# Patient Record
Sex: FEMALE | Race: WHITE | NOT HISPANIC OR LATINO | Employment: FULL TIME | URBAN - METROPOLITAN AREA
[De-identification: names, ages, dates, MRNs, and addresses within clinical notes are randomized per-mention and may not be internally consistent; named-entity substitution may affect disease eponyms.]

---

## 2020-07-07 ENCOUNTER — VIRTUAL VISIT (OUTPATIENT)
Dept: FAMILY MEDICINE | Facility: OTHER | Age: 40
End: 2020-07-07

## 2020-07-07 DIAGNOSIS — Z20.822 SUSPECTED 2019 NOVEL CORONAVIRUS INFECTION: Primary | ICD-10-CM

## 2020-07-07 PROCEDURE — U0003 INFECTIOUS AGENT DETECTION BY NUCLEIC ACID (DNA OR RNA); SEVERE ACUTE RESPIRATORY SYNDROME CORONAVIRUS 2 (SARS-COV-2) (CORONAVIRUS DISEASE [COVID-19]), AMPLIFIED PROBE TECHNIQUE, MAKING USE OF HIGH THROUGHPUT TECHNOLOGIES AS DESCRIBED BY CMS-2020-01-R: HCPCS | Performed by: FAMILY MEDICINE

## 2020-07-07 PROCEDURE — 99207 ZZC NO BILLABLE SERVICE THIS VISIT: CPT

## 2020-07-07 NOTE — LETTER
July 11, 2020        Florinda Keller  [address] [facility] [address]    This letter provides a written record that you were tested for COVID-19 on 7/7/20.       Your result was negative. This means that we didn t find the virus that causes COVID-19 in your sample. A test may show negative when you do actually have the virus. This can happen when the virus is in the early stages of infection, before you feel illness symptoms.    If you have symptoms   Stay home and away from others (self-isolate) until you meet ALL of the guidelines below:    You ve had no fever--and no medicine that reduces fever--for 3 full days (72 hours). And      Your other symptoms have gotten better. For example, your cough or breathing has improved. And     At least 10 days have passed since your symptoms started.    During this time:    Stay home. Don t go to work, school or anywhere else.     Stay in your own room, including for meals. Use your own bathroom if you can.    Stay away from others in your home. No hugging, kissing or shaking hands. No visitors.    Clean  high touch  surfaces often (doorknobs, counters, handles, etc.). Use a household cleaning spray or wipes. You can find a full list on the EPA website at www.epa.gov/pesticide-registration/list-n-disinfectants-use-against-sars-cov-2.    Cover your mouth and nose with a mask, tissue or washcloth to avoid spreading germs.    Wash your hands and face often with soap and water.    Going back to work  Check with your employer for any guidelines to follow for going back to work.    Employers: This document serves as formal notice that your employee tested negative for COVID-19, as of the testing date shown above.

## 2020-07-08 LAB
SARS-COV-2 RNA SPEC QL NAA+PROBE: NOT DETECTED
SPECIMEN SOURCE: NORMAL

## 2020-07-09 NOTE — PROGRESS NOTES
"Date: 2020 08:43:38  Clinician: Dottie Coffman  Clinician NPI: [id number]  Patient: Florinda Keller  Patient : 1980  Patient Address: [address] [place] [address]  Patient Phone: [phone]  Visit Protocol: URI  Patient Summary:  Florinda is a 40 year old ( : 1980 ) female who initiated a Visit for COVID-19 (Coronavirus) evaluation and screening. When asked the question \"Please sign me up to receive news, health information and promotions. \", Florinda responded \"No\".    Florinda states her symptoms started 1-2 days ago.   Her symptoms consist of malaise, a headache, and chills. She is experiencing mild difficulty breathing with activities but can speak normally in full sentences. Florinda also feels feverish but was unable to measure her temperature.   Symptom details   Headache: She states the headache is mild (1-3 on a 10 point pain scale).    Florinda denies having wheezing, nausea, teeth pain, ageusia, diarrhea, vomiting, rhinitis, ear pain, sore throat, myalgias, anosmia, facial pain or pressure, cough, and nasal congestion. She also denies having recent facial or sinus surgery in the past 60 days, taking antibiotic medication in the past month, and having a sinus infection within the past year.   Precipitating events  She has not recently been exposed to someone with influenza. Florinda has not been in close contact with any high risk individuals.   Pertinent COVID-19 (Coronavirus) information  In the past 14 days, Florinda has not worked in a congregate living setting.   She does not work or volunteer as healthcare worker or a  and does not work or volunteer in a healthcare facility.   Florinda also has not lived in a congregate living setting in the past 14 days. She does not live with a healthcare worker.   Florinda has had a close contact with a laboratory-confirmed COVID-19 patient within 14 days of symptom onset. Additional information about contact with COVID-19 (Coronavirus) patient " as reported by the patient (free text): ,  and 7/3 exposed to boyfriend's Mom who was diagnosed with COVID, her    from COVID   Pertinent medical history  Florinda typically gets a yeast infection when she takes antibiotics. She is not sure if she has used fluconazole (Diflucan) to treat previous yeast infections.   Florinda does not need a return to work/school note.   Weight: 179 lbs   Florinda does not smoke or use smokeless tobacco.   She denies pregnancy and denies breastfeeding. She is currently menstruating.   Weight: 179 lbs    MEDICATIONS: levothyroxine oral, Gianvi (28) oral, ALLERGIES: Darvocet-N 100  Clinician Response:  Dear Florinda,   Your symptoms show that you may have coronavirus (COVID-19). This illness can cause fever, cough and trouble breathing. Many people get a mild case and get better on their own. Some people can get very sick.  What should I do?  We would like to test you for this virus.   1. Please call [phone] to schedule your visit. Explain that you were referred by [facility] to have a COVID-19 test. Be ready to share your [facility] visit ID number.  The following will serve as your written order for this COVID Test, ordered by me, for the indication of suspected COVID [Z20.828]: The test will be ordered in [employer], our electronic health record, after you are scheduled. It will show as ordered and authorized by Manuel Bourgeois MD.  Order: COVID-19 (Coronavirus) PCR for SYMPTOMATIC testing from [facility].      2. When it's time for your COVID test:  Stay at least 6 feet away from others. (If someone will drive you to your test, stay in the backseat, as far away from the  as you can.)   Cover your mouth and nose with a mask, tissue or washcloth.  Go straight to the testing site. Don't make any stops on the way there or back.      3.Starting now: Stay home and away from others (self-isolate) until:   You've had no fever---and no medicine that reduces fever---for 3 full days (17  "hours). And...   Your other symptoms have gotten better. For example, your cough or breathing has improved. And...   At least 10 days have passed since your symptoms started.       During this time, don't leave the house except for testing or medical care.   Stay in your own room, even for meals. Use your own bathroom if you can.   Stay away from others in your home. No hugging, kissing or shaking hands. No visitors.  Don't go to work, school or anywhere else.    Clean \"high touch\" surfaces often (doorknobs, counters, handles, etc.). Use a household cleaning spray or wipes. You'll find a full list of  on the EPA website: www.epa.gov/pesticide-registration/list-n-disinfectants-use-against-sars-cov-2.   Cover your mouth and nose with a mask, tissue or washcloth to avoid spreading germs.  Wash your hands and face often. Use soap and water.  Caregivers in these groups are at risk for severe illness due to COVID-19:  o People 65 years and older  o People who live in a nursing home or long-term care facility  o People with chronic disease (lung, heart, cancer, diabetes, kidney, liver, immunologic)  o People who have a weakened immune system, including those who:   Are in cancer treatment  Take medicine that weakens the immune system, such as corticosteroids  Had a bone marrow or organ transplant  Have an immune deficiency  Have poorly controlled HIV or AIDS  Are obese (body mass index of 40 or higher)  Smoke regularly   o Caregivers should wear gloves while washing dishes, handling laundry and cleaning bedrooms and bathrooms.  o Use caution when washing and drying laundry: Don't shake dirty laundry, and use the warmest water setting that you can.  o For more tips, go to www.cdc.gov/coronavirus/2019-ncov/downloads/10Things.pdf.    4.Sign up for [employer]. We know it's scary to hear that you might have COVID-19. We want to track your symptoms to make sure you're okay over the next 2 weeks. Please look for an " email from Javier Bravo---this is a free, online program that we'll use to keep in touch. To sign up, follow the link in the email. Learn more at [email or web address]  How can I take care of myself?   Get lots of rest. Drink extra fluids (unless a doctor has told you not to).   Take Tylenol (acetaminophen) for fever or pain. If you have liver or kidney problems, ask your family doctor if it's okay to take Tylenol.   Adults can take either:    650 mg (two 325 mg pills) every 4 to 6 hours, or...   1,000 mg (two 500 mg pills) every 8 hours as needed.    Note: Don't take more than 3,000 mg in one day. Acetaminophen is found in many medicines (both prescribed and over-the-counter medicines). Read all labels to be sure you don't take too much.   For children, check the Tylenol bottle for the right dose. The dose is based on the child's age or weight.    If you have other health problems (like cancer, heart failure, an organ transplant or severe kidney disease): Call your specialty clinic if you don't feel better in the next 2 days.       Know when to call 911. Emergency warning signs include:    Trouble breathing or shortness of breath Pain or pressure in the chest that doesn't go away Feeling confused like you haven't felt before, or not being able to wake up Bluish-colored lips or face.  Where can I get more information?   [facility] -- About COVID-19: [email or web address]   CDC -- What to Do If You're Sick: www.cdc.gov/coronavirus/2019-ncov/about/steps-when-sick.html   CDC -- Ending Home Isolation: www.cdc.gov/coronavirus/2019-ncov/hcp/disposition-in-home-patients.html   CDC -- Caring for Someone: www.cdc.gov/coronavirus/2019-ncov/if-you-are-sick/care-for-someone.html   [facility] -- Interim Guidance for Hospital Discharge to Home: www.health.[facility].[place]/diseases/coronavirus/hcp/hospdischarge.pdf   [facility] clinical trials (COVID-19 research studies):  clinicalaffairs.[facility]-clinical-trials    Below are the COVID-19 hotlines at the [place] Department of Health ([facility]). Interpreters are available.    For health questions: Call 848-865-3321 or 1-725.750.2011 (7 a.m. to 7 p.m.) For questions about schools and childcare: Call 750-532-0818 or 1-958.151.7976 (7 a.m. to 7 p.m.)    Diagnosis: Acute upper respiratory infection, unspecified  Diagnosis ICD: J06.9

## 2021-05-15 ENCOUNTER — HEALTH MAINTENANCE LETTER (OUTPATIENT)
Age: 41
End: 2021-05-15

## 2021-07-19 ENCOUNTER — TRANSFERRED RECORDS (OUTPATIENT)
Dept: HEALTH INFORMATION MANAGEMENT | Facility: CLINIC | Age: 41
End: 2021-07-19

## 2021-07-20 LAB
ABO (EXTERNAL): NORMAL
HEPATITIS B SURFACE ANTIGEN (EXTERNAL): NEGATIVE
HIV1+2 AB SERPL QL IA: NEGATIVE
RH (EXTERNAL): POSITIVE
RUBELLA ANTIBODY IGG (EXTERNAL): NORMAL
TREPONEMA PALLIDUM ANTIBODY (EXTERNAL): NEGATIVE

## 2021-08-17 ENCOUNTER — MEDICAL CORRESPONDENCE (OUTPATIENT)
Dept: HEALTH INFORMATION MANAGEMENT | Facility: CLINIC | Age: 41
End: 2021-08-17

## 2021-08-18 ENCOUNTER — TRANSCRIBE ORDERS (OUTPATIENT)
Dept: MATERNAL FETAL MEDICINE | Facility: CLINIC | Age: 41
End: 2021-08-18

## 2021-08-18 DIAGNOSIS — O26.90 PREGNANCY RELATED CONDITION, ANTEPARTUM: Primary | ICD-10-CM

## 2021-08-23 ENCOUNTER — PRE VISIT (OUTPATIENT)
Dept: MATERNAL FETAL MEDICINE | Facility: CLINIC | Age: 41
End: 2021-08-23

## 2021-08-30 ENCOUNTER — OFFICE VISIT (OUTPATIENT)
Dept: MATERNAL FETAL MEDICINE | Facility: CLINIC | Age: 41
End: 2021-08-30
Attending: SPECIALIST
Payer: COMMERCIAL

## 2021-08-30 ENCOUNTER — HOSPITAL ENCOUNTER (OUTPATIENT)
Dept: ULTRASOUND IMAGING | Facility: CLINIC | Age: 41
End: 2021-08-30
Attending: SPECIALIST
Payer: COMMERCIAL

## 2021-08-30 DIAGNOSIS — O09.522 MULTIGRAVIDA OF ADVANCED MATERNAL AGE IN SECOND TRIMESTER: Primary | ICD-10-CM

## 2021-08-30 DIAGNOSIS — O26.90 PREGNANCY RELATED CONDITION, ANTEPARTUM: ICD-10-CM

## 2021-08-30 DIAGNOSIS — O09.512 AMA (ADVANCED MATERNAL AGE) PRIMIGRAVIDA 35+, SECOND TRIMESTER: Primary | ICD-10-CM

## 2021-08-30 PROCEDURE — 96040 HC GENETIC COUNSELING, EACH 30 MINUTES: CPT | Performed by: GENETIC COUNSELOR, MS

## 2021-08-30 PROCEDURE — 76811 OB US DETAILED SNGL FETUS: CPT

## 2021-08-30 PROCEDURE — 76811 OB US DETAILED SNGL FETUS: CPT | Mod: 26 | Performed by: OBSTETRICS & GYNECOLOGY

## 2021-08-30 NOTE — PROGRESS NOTES
[facility]  Genetic Counseling Consult    Patient:  Florinda Keller YOB: 1980   Date of Service:  21      Florinda Keller was seen at the [facility] for genetic consultation as part of her appointment for comprehensive ultrasound.  The indication for genetic counseling is advanced maternal age. The patient was unaccompanied to today's visit.        Impression/Plan:   1. Florinda had a comprehensive (level II) ultrasound today.  Please see the ultrasound report for further details.    2. Florinda underwent [id number] NIPT earlier in this pregnancy, which was low risk. She is aware genetic amniocentesis will remain available to her.     Pregnancy History:   /Parity:    Age at Delivery: 41 year old  RAI: 2022, by Last Menstrual Period  Gestational Age: 18w2d    No significant complications or exposures were reported in the current pregnancy.    Medical History:   Florinda has a history of a thyroid growth requiring removal and is now managed with levothyroxine.        Family History:   A three-generation pedigree was obtained, and is scanned under the  Media  tab.   The following significant findings were reported by Florinda:    In addition to Florinda, her maternal grandmother and her partner, Mack's two siblings were reported to have a history of a thyroid growth requiring removal. They were encouraged to share this family history information with their pediatrician.     Florinda's paternal first cousin was reported to have developmental delay, hearing loss, congential heart disease, and a feeding tube. Florinda shared that there was concern for maternal drug use during pregnancy as a possible etiology for these health concerns. This cousin has other healthy siblings.     Florinda's paternal first cousin once removed was reported to have delays and other associated health concerns. Florinda shared that this relative was  identified to have health problems prenatally, however further details are not known. If more information is gathered regarding this individual it would be reasonable to revisit this family history for a more accurate risk assessment.     Otherwise, the reported family history is negative for multiple miscarriages, stillbirths, birth defects, intellectual disability, known genetic conditions, and consanguinity.       Carrier Screening:   The patient reports that she is of  ancestry:     Cystic fibrosis is an autosomal recessive genetic condition that occurs with increased frequency in individuals of  ancestry and carrier screening for this condition is available.  In addition,  screening in the [facility] includes cystic fibrosis.    The patient reports that the father of the pregnancy has [place] and [place] ancestry:     The hemoglobinopathies are a group of genetic blood diseases that occur with increased frequency in individuals of this ancestry and carrier screening for these conditions is available.  Carrier screening for the hemoglobinopathies includes a CBC with red blood cell indices, a ferritin level, and a quantitative hemoglobin electrophoresis or HPLC.  In addition,  screening in the [facility] includes many of the hemoglobinopathies.      Expanded carrier screening for mutations in a large panel of genes associated with autosomal recessive conditions including cystic fibrosis, spinal muscular atrophy, and others, is now available.      Florinda underwent [employer] direct to consumer testing. 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 Her partner has not undergone carrier screening for this condition. Reviewed limitations of [employer] carrier screening and discussed option of comprehensive  clinical expanded carrier screening for the couple. Florinda is aware that this will remain available to her and Mack.        Risk Assessment for Chromosome Conditions:   We explained that the risk for fetal chromosome abnormalities increases with maternal age. We discussed specific features of common chromosome abnormalities, including Down syndrome, trisomy 13, trisomy 18, and sex chromosome trisomies.      - At age 41 at midtrimester, the risk to have a baby with Down syndrome is 1 in 98.     - At age 41 at midtrimester, the risk to have a baby with any chromosome abnormality is 1 in 51.       Florinda had maternal serum screening earlier in pregnancy.     Non-invasive Prenatal Testing (NIPT)    Maternal plasma cell-free DNA testing    Screens for fetal trisomy 21, trisomy 13, trisomy 18, and sex chromosome aneuploidy    Florinda had a [id number] NIPT earlier in pregnancy; we reviewed the results today, which are normal for chromosome 13, chromosome 18 and chromosome 21 (no aneuploidy detected). SCA was not performed.     Given the accuracy of this test, these results greatly decrease the chance for certain fetal chromosome abnormalities    We discussed the limitations of normal NIPT results    MSAFP (after 15 weeks for open neural tube defect screening) results were not available for our review today.       Testing Options:   We discussed the following options:   Genetic Amniocentesis    Invasive procedure typically performed in the second trimester by which amniotic fluid is obtained for the purpose of chromosome analysis and/or other prenatal genetic analysis    Diagnostic results; >99% sensitivity for fetal chromosome abnormalities    AFAFP measurement tests for open neural tube defects     Comprehensive (Level II) ultrasound: Detailed ultrasound performed between 18-22 weeks gestation to screen for major birth defects and markers for aneuploidy.      We reviewed the benefits and limitations of this testing.  Screening  tests provide a risk assessment specific to the pregnancy for certain fetal chromosome abnormalities, but cannot definitively diagnose or exclude a fetal chromosome abnormality.  Follow-up genetic counseling and consideration of diagnostic testing is recommended with any abnormal screening result.     Diagnostic tests carry inherent risks- including risk of miscarriage- that require careful consideration.  These tests can detect fetal chromosome abnormalities with greater than 99% certainty.  Results can be compromised by maternal cell contamination or mosaicism, and are limited by the resolution of cytogenetic G-banding technology.  There is no screening nor diagnostic test that can detect all forms of birth defects or mental disability.    It was a pleasure to be involved with Florinda s care. Face-to-face time of the meeting was 25 minutes.      Yazmin Garcia MS, [facility]  Licensed Genetic Counselor  [facility]  Maternal Fetal Medicine  Ph: [phone]  [email or web address]

## 2021-08-30 NOTE — PROGRESS NOTES
Please see full imaging report from View[employer] program under imaging tab.      Ivory Montalvo MD  Maternal Fetal Medicine

## 2021-09-04 ENCOUNTER — HEALTH MAINTENANCE LETTER (OUTPATIENT)
Age: 41
End: 2021-09-04

## 2021-11-08 ENCOUNTER — TRANSFERRED RECORDS (OUTPATIENT)
Dept: HEALTH INFORMATION MANAGEMENT | Facility: CLINIC | Age: 41
End: 2021-11-08
Payer: COMMERCIAL

## 2022-01-05 LAB — GROUP B STREPTOCOCCUS (EXTERNAL): NEGATIVE

## 2022-01-31 DIAGNOSIS — Z11.52 ENCOUNTER FOR PREOPERATIVE SCREENING LABORATORY TESTING FOR COVID-19 VIRUS: Primary | ICD-10-CM

## 2022-01-31 DIAGNOSIS — Z01.812 ENCOUNTER FOR PREOPERATIVE SCREENING LABORATORY TESTING FOR COVID-19 VIRUS: Primary | ICD-10-CM

## 2022-02-02 ENCOUNTER — LAB (OUTPATIENT)
Dept: LAB | Facility: CLINIC | Age: 42
End: 2022-02-02
Attending: SPECIALIST
Payer: COMMERCIAL

## 2022-02-02 DIAGNOSIS — Z11.52 ENCOUNTER FOR PREOPERATIVE SCREENING LABORATORY TESTING FOR COVID-19 VIRUS: ICD-10-CM

## 2022-02-02 DIAGNOSIS — Z01.812 ENCOUNTER FOR PREOPERATIVE SCREENING LABORATORY TESTING FOR COVID-19 VIRUS: ICD-10-CM

## 2022-02-02 PROCEDURE — U0003 INFECTIOUS AGENT DETECTION BY NUCLEIC ACID (DNA OR RNA); SEVERE ACUTE RESPIRATORY SYNDROME CORONAVIRUS 2 (SARS-COV-2) (CORONAVIRUS DISEASE [COVID-19]), AMPLIFIED PROBE TECHNIQUE, MAKING USE OF HIGH THROUGHPUT TECHNOLOGIES AS DESCRIBED BY CMS-2020-01-R: HCPCS

## 2022-02-02 PROCEDURE — U0005 INFEC AGEN DETEC AMPLI PROBE: HCPCS

## 2022-02-03 LAB — SARS-COV-2 RNA RESP QL NAA+PROBE: NEGATIVE

## 2022-02-04 ENCOUNTER — HOSPITAL ENCOUNTER (INPATIENT)
Facility: CLINIC | Age: 42
LOS: 3 days | Discharge: HOME | End: 2022-02-07
Attending: SPECIALIST | Admitting: SPECIALIST
Payer: COMMERCIAL

## 2022-02-04 ENCOUNTER — ANESTHESIA EVENT (OUTPATIENT)
Dept: OBGYN | Facility: CLINIC | Age: 42
End: 2022-02-04
Payer: COMMERCIAL

## 2022-02-04 ENCOUNTER — ANESTHESIA (OUTPATIENT)
Dept: OBGYN | Facility: CLINIC | Age: 42
End: 2022-02-04
Payer: COMMERCIAL

## 2022-02-04 LAB
ABO/RH(D): NORMAL
ANTIBODY SCREEN: NEGATIVE
BASOPHILS # BLD AUTO: 0 10E3/UL (ref 0–0.2)
BASOPHILS NFR BLD AUTO: 0 %
EOSINOPHIL # BLD AUTO: 0.1 10E3/UL (ref 0–0.7)
EOSINOPHIL NFR BLD AUTO: 1 %
ERYTHROCYTE [DISTWIDTH] IN BLOOD BY AUTOMATED COUNT: 13.5 % (ref 10–15)
HCT VFR BLD AUTO: 35.4 % (ref 35–47)
HGB BLD-MCNC: 11.4 G/DL (ref 11.7–15.7)
IMM GRANULOCYTES # BLD: 0.1 10E3/UL
IMM GRANULOCYTES NFR BLD: 1 %
LYMPHOCYTES # BLD AUTO: 2 10E3/UL (ref 0.8–5.3)
LYMPHOCYTES NFR BLD AUTO: 18 %
MCH RBC QN AUTO: 28.3 PG (ref 26.5–33)
MCHC RBC AUTO-ENTMCNC: 32.2 G/DL (ref 31.5–36.5)
MCV RBC AUTO: 88 FL (ref 78–100)
MONOCYTES # BLD AUTO: 0.6 10E3/UL (ref 0–1.3)
MONOCYTES NFR BLD AUTO: 5 %
NEUTROPHILS # BLD AUTO: 8.1 10E3/UL (ref 1.6–8.3)
NEUTROPHILS NFR BLD AUTO: 75 %
NRBC # BLD AUTO: 0 10E3/UL
NRBC BLD AUTO-RTO: 0 /100
PLATELET # BLD AUTO: 241 10E3/UL (ref 150–450)
RBC # BLD AUTO: 4.03 10E6/UL (ref 3.8–5.2)
SPECIMEN EXPIRATION DATE: NORMAL
WBC # BLD AUTO: 10.8 10E3/UL (ref 4–11)

## 2022-02-04 PROCEDURE — 86780 TREPONEMA PALLIDUM: CPT | Performed by: SPECIALIST

## 2022-02-04 PROCEDURE — 120N000001 HC R&B MED SURG/OB

## 2022-02-04 PROCEDURE — G0463 HOSPITAL OUTPT CLINIC VISIT: HCPCS | Mod: 25

## 2022-02-04 PROCEDURE — 85014 HEMATOCRIT: CPT | Performed by: SPECIALIST

## 2022-02-04 PROCEDURE — 370N000003 HC ANESTHESIA WARD SERVICE

## 2022-02-04 PROCEDURE — 86901 BLOOD TYPING SEROLOGIC RH(D): CPT | Performed by: SPECIALIST

## 2022-02-04 PROCEDURE — 258N000003 HC RX IP 258 OP 636: Performed by: SPECIALIST

## 2022-02-04 PROCEDURE — 250N000011 HC RX IP 250 OP 636: Performed by: ANESTHESIOLOGY

## 2022-02-04 PROCEDURE — 59025 FETAL NON-STRESS TEST: CPT

## 2022-02-04 PROCEDURE — 36415 COLL VENOUS BLD VENIPUNCTURE: CPT | Performed by: SPECIALIST

## 2022-02-04 RX ORDER — ONDANSETRON 4 MG/1
4 TABLET, ORALLY DISINTEGRATING ORAL EVERY 6 HOURS PRN
Status: DISCONTINUED | OUTPATIENT
Start: 2022-02-04 | End: 2022-02-04 | Stop reason: HOSPADM

## 2022-02-04 RX ORDER — PROCHLORPERAZINE MALEATE 5 MG
10 TABLET ORAL EVERY 6 HOURS PRN
Status: DISCONTINUED | OUTPATIENT
Start: 2022-02-04 | End: 2022-02-04 | Stop reason: HOSPADM

## 2022-02-04 RX ORDER — NALOXONE HYDROCHLORIDE 0.4 MG/ML
0.2 INJECTION, SOLUTION INTRAMUSCULAR; INTRAVENOUS; SUBCUTANEOUS
Status: DISCONTINUED | OUTPATIENT
Start: 2022-02-04 | End: 2022-02-05 | Stop reason: HOSPADM

## 2022-02-04 RX ORDER — CARBOPROST TROMETHAMINE 250 UG/ML
250 INJECTION, SOLUTION INTRAMUSCULAR
Status: DISCONTINUED | OUTPATIENT
Start: 2022-02-04 | End: 2022-02-05 | Stop reason: HOSPADM

## 2022-02-04 RX ORDER — METOCLOPRAMIDE 10 MG/1
10 TABLET ORAL EVERY 6 HOURS PRN
Status: DISCONTINUED | OUTPATIENT
Start: 2022-02-04 | End: 2022-02-05 | Stop reason: HOSPADM

## 2022-02-04 RX ORDER — NALOXONE HYDROCHLORIDE 0.4 MG/ML
0.4 INJECTION, SOLUTION INTRAMUSCULAR; INTRAVENOUS; SUBCUTANEOUS
Status: DISCONTINUED | OUTPATIENT
Start: 2022-02-04 | End: 2022-02-05 | Stop reason: HOSPADM

## 2022-02-04 RX ORDER — EPHEDRINE SULFATE 50 MG/ML
5 INJECTION, SOLUTION INTRAMUSCULAR; INTRAVENOUS; SUBCUTANEOUS
Status: DISCONTINUED | OUTPATIENT
Start: 2022-02-04 | End: 2022-02-05 | Stop reason: HOSPADM

## 2022-02-04 RX ORDER — ONDANSETRON 2 MG/ML
4 INJECTION INTRAMUSCULAR; INTRAVENOUS EVERY 6 HOURS PRN
Status: DISCONTINUED | OUTPATIENT
Start: 2022-02-04 | End: 2022-02-05 | Stop reason: HOSPADM

## 2022-02-04 RX ORDER — ONDANSETRON 4 MG/1
4 TABLET, ORALLY DISINTEGRATING ORAL EVERY 6 HOURS PRN
Status: DISCONTINUED | OUTPATIENT
Start: 2022-02-04 | End: 2022-02-05 | Stop reason: HOSPADM

## 2022-02-04 RX ORDER — ACETAMINOPHEN 325 MG/1
650 TABLET ORAL EVERY 4 HOURS PRN
Status: DISCONTINUED | OUTPATIENT
Start: 2022-02-04 | End: 2022-02-05 | Stop reason: HOSPADM

## 2022-02-04 RX ORDER — IBUPROFEN 600 MG/1
600 TABLET, FILM COATED ORAL
Status: DISCONTINUED | OUTPATIENT
Start: 2022-02-04 | End: 2022-02-07 | Stop reason: HOSPADM

## 2022-02-04 RX ORDER — OXYTOCIN/0.9 % SODIUM CHLORIDE 30/500 ML
340 PLASTIC BAG, INJECTION (ML) INTRAVENOUS CONTINUOUS PRN
Status: DISCONTINUED | OUTPATIENT
Start: 2022-02-04 | End: 2022-02-05 | Stop reason: HOSPADM

## 2022-02-04 RX ORDER — PROCHLORPERAZINE 25 MG
25 SUPPOSITORY, RECTAL RECTAL EVERY 12 HOURS PRN
Status: DISCONTINUED | OUTPATIENT
Start: 2022-02-04 | End: 2022-02-05 | Stop reason: HOSPADM

## 2022-02-04 RX ORDER — METOCLOPRAMIDE 10 MG/1
10 TABLET ORAL EVERY 6 HOURS PRN
Status: DISCONTINUED | OUTPATIENT
Start: 2022-02-04 | End: 2022-02-04 | Stop reason: HOSPADM

## 2022-02-04 RX ORDER — METHYLERGONOVINE MALEATE 0.2 MG/ML
200 INJECTION INTRAVENOUS
Status: DISCONTINUED | OUTPATIENT
Start: 2022-02-04 | End: 2022-02-05 | Stop reason: HOSPADM

## 2022-02-04 RX ORDER — OXYTOCIN/0.9 % SODIUM CHLORIDE 30/500 ML
100-340 PLASTIC BAG, INJECTION (ML) INTRAVENOUS CONTINUOUS PRN
Status: DISCONTINUED | OUTPATIENT
Start: 2022-02-04 | End: 2022-02-07 | Stop reason: HOSPADM

## 2022-02-04 RX ORDER — OXYTOCIN 10 [USP'U]/ML
10 INJECTION, SOLUTION INTRAMUSCULAR; INTRAVENOUS
Status: DISCONTINUED | OUTPATIENT
Start: 2022-02-04 | End: 2022-02-05 | Stop reason: HOSPADM

## 2022-02-04 RX ORDER — KETOROLAC TROMETHAMINE 30 MG/ML
30 INJECTION, SOLUTION INTRAMUSCULAR; INTRAVENOUS
Status: DISCONTINUED | OUTPATIENT
Start: 2022-02-04 | End: 2022-02-07 | Stop reason: HOSPADM

## 2022-02-04 RX ORDER — VITAMIN A ACETATE, .BETA.-CAROTENE, ASCORBIC ACID, CHOLECALCIFEROL, .ALPHA.-TOCOPHEROL ACETATE, DL-, THIAMINE MONONITRATE, RIBOFLAVIN, NIACINAMIDE, PYRIDOXINE HYDROCHLORIDE, FOLIC ACID, CYANOCOBALAMIN, CALCIUM CARBONATE, FERROUS FUMARATE, ZINC OXIDE, AND CUPRIC OXIDE 2000; 2000; 120; 400; 22; 1.84; 3; 20; 10; 1; 12; 200; 27; 25; 2 [IU]/1; [IU]/1; MG/1; [IU]/1; MG/1; MG/1; MG/1; MG/1; MG/1; MG/1; UG/1; MG/1; MG/1; MG/1; MG/1
1 TABLET ORAL DAILY
COMMUNITY

## 2022-02-04 RX ORDER — TRANEXAMIC ACID 10 MG/ML
1 INJECTION, SOLUTION INTRAVENOUS EVERY 30 MIN PRN
Status: DISCONTINUED | OUTPATIENT
Start: 2022-02-04 | End: 2022-02-05 | Stop reason: HOSPADM

## 2022-02-04 RX ORDER — METOCLOPRAMIDE HYDROCHLORIDE 5 MG/ML
10 INJECTION INTRAMUSCULAR; INTRAVENOUS EVERY 6 HOURS PRN
Status: DISCONTINUED | OUTPATIENT
Start: 2022-02-04 | End: 2022-02-05 | Stop reason: HOSPADM

## 2022-02-04 RX ORDER — ONDANSETRON 2 MG/ML
4 INJECTION INTRAMUSCULAR; INTRAVENOUS EVERY 6 HOURS PRN
Status: DISCONTINUED | OUTPATIENT
Start: 2022-02-04 | End: 2022-02-04 | Stop reason: HOSPADM

## 2022-02-04 RX ORDER — MISOPROSTOL 200 UG/1
800 TABLET ORAL
Status: DISCONTINUED | OUTPATIENT
Start: 2022-02-04 | End: 2022-02-05 | Stop reason: HOSPADM

## 2022-02-04 RX ORDER — FENTANYL CITRATE 50 UG/ML
50-100 INJECTION, SOLUTION INTRAMUSCULAR; INTRAVENOUS
Status: DISCONTINUED | OUTPATIENT
Start: 2022-02-04 | End: 2022-02-05 | Stop reason: HOSPADM

## 2022-02-04 RX ORDER — OXYTOCIN 10 [USP'U]/ML
10 INJECTION, SOLUTION INTRAMUSCULAR; INTRAVENOUS
Status: DISCONTINUED | OUTPATIENT
Start: 2022-02-04 | End: 2022-02-07 | Stop reason: HOSPADM

## 2022-02-04 RX ORDER — PROCHLORPERAZINE MALEATE 5 MG
10 TABLET ORAL EVERY 6 HOURS PRN
Status: DISCONTINUED | OUTPATIENT
Start: 2022-02-04 | End: 2022-02-05 | Stop reason: HOSPADM

## 2022-02-04 RX ORDER — SODIUM CHLORIDE, SODIUM LACTATE, POTASSIUM CHLORIDE, CALCIUM CHLORIDE 600; 310; 30; 20 MG/100ML; MG/100ML; MG/100ML; MG/100ML
INJECTION, SOLUTION INTRAVENOUS CONTINUOUS
Status: DISCONTINUED | OUTPATIENT
Start: 2022-02-04 | End: 2022-02-05 | Stop reason: HOSPADM

## 2022-02-04 RX ORDER — ROPIVACAINE HYDROCHLORIDE 2 MG/ML
10 INJECTION, SOLUTION EPIDURAL; INFILTRATION; PERINEURAL ONCE
Status: DISCONTINUED | OUTPATIENT
Start: 2022-02-04 | End: 2022-02-05 | Stop reason: HOSPADM

## 2022-02-04 RX ORDER — PROCHLORPERAZINE 25 MG
25 SUPPOSITORY, RECTAL RECTAL EVERY 12 HOURS PRN
Status: DISCONTINUED | OUTPATIENT
Start: 2022-02-04 | End: 2022-02-04 | Stop reason: HOSPADM

## 2022-02-04 RX ORDER — NALBUPHINE HYDROCHLORIDE 10 MG/ML
2.5-5 INJECTION, SOLUTION INTRAMUSCULAR; INTRAVENOUS; SUBCUTANEOUS EVERY 6 HOURS PRN
Status: DISCONTINUED | OUTPATIENT
Start: 2022-02-04 | End: 2022-02-07 | Stop reason: HOSPADM

## 2022-02-04 RX ORDER — MISOPROSTOL 200 UG/1
400 TABLET ORAL
Status: DISCONTINUED | OUTPATIENT
Start: 2022-02-04 | End: 2022-02-05 | Stop reason: HOSPADM

## 2022-02-04 RX ORDER — METOCLOPRAMIDE HYDROCHLORIDE 5 MG/ML
10 INJECTION INTRAMUSCULAR; INTRAVENOUS EVERY 6 HOURS PRN
Status: DISCONTINUED | OUTPATIENT
Start: 2022-02-04 | End: 2022-02-04 | Stop reason: HOSPADM

## 2022-02-04 RX ORDER — LEVOTHYROXINE SODIUM 88 UG/1
88 TABLET ORAL DAILY
COMMUNITY

## 2022-02-04 RX ADMIN — SODIUM CHLORIDE, POTASSIUM CHLORIDE, SODIUM LACTATE AND CALCIUM CHLORIDE 1000 ML: 600; 310; 30; 20 INJECTION, SOLUTION INTRAVENOUS at 21:18

## 2022-02-04 RX ADMIN — Medication: at 22:05

## 2022-02-04 RX ADMIN — SODIUM CHLORIDE, POTASSIUM CHLORIDE, SODIUM LACTATE AND CALCIUM CHLORIDE: 600; 310; 30; 20 INJECTION, SOLUTION INTRAVENOUS at 21:56

## 2022-02-04 ASSESSMENT — ACTIVITIES OF DAILY LIVING (ADL): ADLS_ACUITY_SCORE: 4

## 2022-02-04 ASSESSMENT — MIFFLIN-ST. JEOR: SCORE: 1707.81

## 2022-02-05 LAB — T PALLIDUM AB SER QL: NONREACTIVE

## 2022-02-05 PROCEDURE — 250N000013 HC RX MED GY IP 250 OP 250 PS 637: Performed by: SPECIALIST

## 2022-02-05 PROCEDURE — 722N000001 HC LABOR CARE VAGINAL DELIVERY SINGLE

## 2022-02-05 PROCEDURE — 10907ZC DRAINAGE OF AMNIOTIC FLUID, THERAPEUTIC FROM PRODUCTS OF CONCEPTION, VIA NATURAL OR ARTIFICIAL OPENING: ICD-10-PCS | Performed by: SPECIALIST

## 2022-02-05 PROCEDURE — 250N000011 HC RX IP 250 OP 636: Performed by: ANESTHESIOLOGY

## 2022-02-05 PROCEDURE — 3E0R3BZ INTRODUCTION OF ANESTHETIC AGENT INTO SPINAL CANAL, PERCUTANEOUS APPROACH: ICD-10-PCS | Performed by: ANESTHESIOLOGY

## 2022-02-05 PROCEDURE — 120N000012 HC R&B POSTPARTUM

## 2022-02-05 PROCEDURE — 00HU33Z INSERTION OF INFUSION DEVICE INTO SPINAL CANAL, PERCUTANEOUS APPROACH: ICD-10-PCS | Performed by: ANESTHESIOLOGY

## 2022-02-05 PROCEDURE — 0KQM0ZZ REPAIR PERINEUM MUSCLE, OPEN APPROACH: ICD-10-PCS | Performed by: SPECIALIST

## 2022-02-05 PROCEDURE — 250N000009 HC RX 250: Performed by: SPECIALIST

## 2022-02-05 RX ORDER — CARBOPROST TROMETHAMINE 250 UG/ML
250 INJECTION, SOLUTION INTRAMUSCULAR
Status: DISCONTINUED | OUTPATIENT
Start: 2022-02-05 | End: 2022-02-07 | Stop reason: HOSPADM

## 2022-02-05 RX ORDER — IBUPROFEN 400 MG/1
800 TABLET, FILM COATED ORAL EVERY 6 HOURS PRN
Status: DISCONTINUED | OUTPATIENT
Start: 2022-02-05 | End: 2022-02-07 | Stop reason: HOSPADM

## 2022-02-05 RX ORDER — LEVOTHYROXINE SODIUM 88 UG/1
88 TABLET ORAL
Status: DISCONTINUED | OUTPATIENT
Start: 2022-02-05 | End: 2022-02-07 | Stop reason: HOSPADM

## 2022-02-05 RX ORDER — DOCUSATE SODIUM 100 MG/1
100 CAPSULE, LIQUID FILLED ORAL DAILY
Status: DISCONTINUED | OUTPATIENT
Start: 2022-02-05 | End: 2022-02-07 | Stop reason: HOSPADM

## 2022-02-05 RX ORDER — BISACODYL 10 MG
10 SUPPOSITORY, RECTAL RECTAL DAILY PRN
Status: DISCONTINUED | OUTPATIENT
Start: 2022-02-05 | End: 2022-02-07 | Stop reason: HOSPADM

## 2022-02-05 RX ORDER — METHYLERGONOVINE MALEATE 0.2 MG/ML
200 INJECTION INTRAVENOUS
Status: DISCONTINUED | OUTPATIENT
Start: 2022-02-05 | End: 2022-02-07 | Stop reason: HOSPADM

## 2022-02-05 RX ORDER — OXYCODONE HYDROCHLORIDE 5 MG/1
5 TABLET ORAL EVERY 4 HOURS PRN
Status: DISCONTINUED | OUTPATIENT
Start: 2022-02-05 | End: 2022-02-07 | Stop reason: HOSPADM

## 2022-02-05 RX ORDER — MISOPROSTOL 200 UG/1
400 TABLET ORAL
Status: DISCONTINUED | OUTPATIENT
Start: 2022-02-05 | End: 2022-02-07 | Stop reason: HOSPADM

## 2022-02-05 RX ORDER — OXYTOCIN 10 [USP'U]/ML
10 INJECTION, SOLUTION INTRAMUSCULAR; INTRAVENOUS
Status: DISCONTINUED | OUTPATIENT
Start: 2022-02-05 | End: 2022-02-07 | Stop reason: HOSPADM

## 2022-02-05 RX ORDER — MODIFIED LANOLIN
OINTMENT (GRAM) TOPICAL
Status: DISCONTINUED | OUTPATIENT
Start: 2022-02-05 | End: 2022-02-07 | Stop reason: HOSPADM

## 2022-02-05 RX ORDER — OXYTOCIN/0.9 % SODIUM CHLORIDE 30/500 ML
1-24 PLASTIC BAG, INJECTION (ML) INTRAVENOUS CONTINUOUS
Status: DISCONTINUED | OUTPATIENT
Start: 2022-02-05 | End: 2022-02-05 | Stop reason: HOSPADM

## 2022-02-05 RX ORDER — SODIUM CHLORIDE, SODIUM LACTATE, POTASSIUM CHLORIDE, CALCIUM CHLORIDE 600; 310; 30; 20 MG/100ML; MG/100ML; MG/100ML; MG/100ML
INJECTION, SOLUTION INTRAVENOUS CONTINUOUS PRN
Status: DISCONTINUED | OUTPATIENT
Start: 2022-02-05 | End: 2022-02-05 | Stop reason: HOSPADM

## 2022-02-05 RX ORDER — HYDROCORTISONE 2.5 %
CREAM (GRAM) TOPICAL 3 TIMES DAILY PRN
Status: DISCONTINUED | OUTPATIENT
Start: 2022-02-05 | End: 2022-02-07 | Stop reason: HOSPADM

## 2022-02-05 RX ORDER — TRANEXAMIC ACID 10 MG/ML
1 INJECTION, SOLUTION INTRAVENOUS EVERY 30 MIN PRN
Status: DISCONTINUED | OUTPATIENT
Start: 2022-02-05 | End: 2022-02-07 | Stop reason: HOSPADM

## 2022-02-05 RX ORDER — OXYTOCIN/0.9 % SODIUM CHLORIDE 30/500 ML
340 PLASTIC BAG, INJECTION (ML) INTRAVENOUS CONTINUOUS PRN
Status: DISCONTINUED | OUTPATIENT
Start: 2022-02-05 | End: 2022-02-07 | Stop reason: HOSPADM

## 2022-02-05 RX ORDER — ACETAMINOPHEN 325 MG/1
650 TABLET ORAL EVERY 4 HOURS PRN
Status: DISCONTINUED | OUTPATIENT
Start: 2022-02-05 | End: 2022-02-07 | Stop reason: HOSPADM

## 2022-02-05 RX ORDER — MISOPROSTOL 200 UG/1
800 TABLET ORAL
Status: DISCONTINUED | OUTPATIENT
Start: 2022-02-05 | End: 2022-02-07 | Stop reason: HOSPADM

## 2022-02-05 RX ADMIN — LEVOTHYROXINE SODIUM 88 MCG: 88 TABLET ORAL at 09:11

## 2022-02-05 RX ADMIN — Medication 340 ML/HR: at 04:22

## 2022-02-05 RX ADMIN — ROPIVACAINE HYDROCHLORIDE 10 ML: 2 INJECTION, SOLUTION EPIDURAL; INFILTRATION at 22:20

## 2022-02-05 RX ADMIN — DOCUSATE SODIUM 100 MG: 100 CAPSULE, LIQUID FILLED ORAL at 09:11

## 2022-02-05 RX ADMIN — IBUPROFEN 800 MG: 400 TABLET, FILM COATED ORAL at 05:25

## 2022-02-05 RX ADMIN — Medication 2 MILLI-UNITS/MIN: at 03:55

## 2022-02-05 ASSESSMENT — ACTIVITIES OF DAILY LIVING (ADL)
ADLS_ACUITY_SCORE: 6
ADLS_ACUITY_SCORE: 4
ADLS_ACUITY_SCORE: 4
ADLS_ACUITY_SCORE: 6
ADLS_ACUITY_SCORE: 6
ADLS_ACUITY_SCORE: 4
ADLS_ACUITY_SCORE: 4
ADLS_ACUITY_SCORE: 6
ADLS_ACUITY_SCORE: 4
ADLS_ACUITY_SCORE: 4
ADLS_ACUITY_SCORE: 6
ADLS_ACUITY_SCORE: 4
ADLS_ACUITY_SCORE: 4
ADLS_ACUITY_SCORE: 6
ADLS_ACUITY_SCORE: 4
ADLS_ACUITY_SCORE: 4

## 2022-02-05 NOTE — PLAN OF CARE
After patient ate breakfast she ambulated to bathroom, voided and pericare reviewed. Patient ambulated to wheelchair for transport to [facility].

## 2022-02-05 NOTE — PLAN OF CARE
Data: Florinda Keller transferred to 44 via wheelchair at 0800. Baby transferred via parent's arms.  Action: Receiving unit notified of transfer: Yes. Patient and family notified of room change. Report given to Deyanira WRIGHT RN at 0800. Belongings sent to receiving unit. Accompanied by Registered Nurse. Oriented patient to surroundings. Call light within reach. ID bands double-checked with receiving RN.  Response: Patient tolerated transfer and is stable.

## 2022-02-05 NOTE — PLAN OF CARE
Pt. admitted from L&D  via wheel chair and transferred to bed with stand by assist. Pt. arrived with baby and was accompanied by , nurse, and arrived with personal belongings. Report was taken from Kayleigh Sauceda RN in L&D. VSS. Fundus is firm and midline.  Vaginal bleeding is light.  SL intact.  Pt. oriented to the room and call light system.

## 2022-02-05 NOTE — PLAN OF CARE
Data: Patient presented to [facility] at .   Reason for maternal/fetal assessment per patient is Decreased Fetal Movement and Contractions  .  Patient is a . Prenatal record reviewed.      OB History    Para Term  AB Living   1 0 0 0 0 0   SAB IAB Ectopic Multiple Live Births   0 0 0 0 0      # Outcome Date GA Lbr Wes/2nd Weight Sex Delivery Anes PTL Lv   1 Current            . Medical history:   Past Medical History:   Diagnosis Date     Thyroid disease    . Gestational Age 40w6d. VSS. Fetal movement present. Patient denies cramping, backache, vaginal discharge, pelvic pressure, UTI symptoms, GI problems, bloody show, vaginal bleeding, edema, headache, visual disturbances, epigastric or URQ pain, abdominal pain, rupture of membranes. Support persons Mack present.  Action: Verbal consent for EFM. Triage assessment completed. EFM applied for fetal wellbeing during decreased fetal movement. Uterine assessment soft and non tender to touch.   Response: Dr. Chun informed of but not limited to above information prenatal history, contraction pattern, FHT's, and SVE. Plan per provider is admit to labor and delivery.  May have epidural for pain management. Patient verbalized agreement with plan. Patient transferred to room 219 ambulatory, oriented to room and call light. Report given to Ivelisse CASTRO RN.

## 2022-02-05 NOTE — L&D DELIVERY NOTE
OB Vaginal Delivery Note    Florinda Keller MRN# [id number]   Age: 41 year old YOB: 1980       GA: 41w0d  GP:   Labor Complications: None   EBL: 25  mL  Delivery QBL:    Delivery Type: Vaginal, Spontaneous   ROM to Delivery Time: (Delivered) Hours: 6 Minutes: 0   Weight:     1 Minute 5 Minute 10 Minute   Apgar Totals:            ALEXY, CORINNA G;BRITTNEY VILLALOBOS     Delivery Details:  Florinda Keller, a 41 year old  female delivered a viable infant with apgars of   and  . Patient was fully dilated and pushing after   hours   minutes in active labor. Delivery was via vaginal, spontaneous  to a sterile field under epidural  anesthesia. Infant delivered in vertex    occiput  anterior  position. Anterior and posterior shoulders delivered without difficulty. The cord was clamped, cut twice and 3 vessels  were noted. Cord blood was obtained in routine fashion with the following disposition: lab .      Cord complications: none   Placenta delivered at 2022  4:22 AM . Placental disposition was Hospital disposal . Fundal massage performed and fundus found to be firm.     Episiotomy: none    Perineum, vagina, cervix were inspected, and the following lacerations were noted:   Perineal lacerations: 2nd                Any lacerations were repaired in the usual fashion using 3.0 vicryl    Excellent hemostasis was noted. Needle count correct. Infant and patient in delivery room in good and stable condition.        Krishna Female-Florinda [[id number]]    Labor Event Times    Dilation complete date: 22 Complete time:  1:52 AM   Start pushing date/time: 2022 0155      Labor Length    2nd Stage (hrs): 2 (min): 25   3rd Stage (hrs): 0 (min): 5      Labor Events     labor?: No   steroids: None  Labor Type: Spontaneous  Predominate monitoring during 1st stage: continuous electronic fetal monitoring     Antibiotics received during labor?: No     Rupture identifier: Sac  1  Rupture date/time: 22   Rupture type: Artificial Rupture of Membranes  Fluid color: Meconium     Augmentation: Oxytocin  Indications for augmentation: Ineffective Contraction Pattern  1:1 continuous labor support provided by?: RN       Delivery/Placenta Date and Time    Delivery Date: 22 Delivery Time:  4:17 AM   Placenta Date/Time: 2022  4:22 AM  Oxytocin given at the time of delivery: after delivery of placenta  Delivering clinician: Miriam Chun MD   Other personnel present at delivery:  Provider Role   Addie Mccauley RN Registered Nurse   Ivelisse Sexton RN Registered Nurse         Vaginal Counts     Initial count performed by 2 team members:  Two Team Members   MD JORDANA Borja RN       Badger Suture Needles Sponges (RETIRED) Instruments   Initial counts 2 0 5    Added to count 0 2 0    Relief counts 0 0 0    Final counts 2 2 5          Placed during labor Accounted for at the end of labor   FSE NA NA   IUPC NA NA   Cervadil NA NA              Final count performed by 2 team members:  Two Team Members   NIEVES Sexton RN      Final count correct?: Yes     Apgars    Living status: Living   1 Minute 5 Minute 10 Minute 15 Minute 20 Minute   Skin color:        Heart rate:        Reflex irritability:        Muscle tone:        Respiratory effort:        Total:           Cord    Vessels: 3 Vessels    Cord Complications: None               Cord Blood Disposition: Lab    Gases Sent?: No    Delayed cord clamping?: Yes    Cord Clamping Delay (seconds):  seconds    Stem cell collection?: No       [place] Resuscitation    Methods: None  [place] Care at Delivery: NICU delivery team called by Dr. Miriam Clemente to attend the vaginal delivery of a term infant with meconium stained fluid. Infant delivered with spontaneous lusty cry and good tone, placed on mother's abdomen, dried and stimulated during delayed cord clamping. Continued strong cry and good tone. Cristine  ARASH Meade, CNP-BC 2/5/2022 4:23 AM       Skin to Skin and Feeding Plan    Skin to skin initiation date/time: 1/2/1841    Skin to skin with: Mother  Skin to skin end date/time:        Labor Events and Shoulder Dystocia    Fetal Tracing Prior to Delivery: Category 2  Shoulder dystocia present?: Neg     Delivery (Maternal) (Provider to Complete) ([id number])    Episiotomy: None  Perineal lacerations: 2nd Repaired?: Yes   Est. blood loss (mL): 25  Repair suture: 3-0 Vicryl  Number of repair packets: 2  Genital tract inspection done: Pos     Blood Loss  Mother: Florinda Keller #[id number]   Start of Mother's Information    Delivery Blood Loss  02/04/22 1617 - 02/05/22 0445    EBL (mL) Hospital Encounter 25 mL    Total  25 mL         End of Mother's Information  Mother: Florinda Keller #[id number]          Delivery - Provider to Complete ([id number])    Delivering clinician: Miriam Chun MD  Attempted Delivery Types (Choose all that apply): Spontaneous Vaginal Delivery  Delivery Type (Choose the 1 that will go to the Birth History): Vaginal, Spontaneous                   Other personnel:  Provider Role   Addie Mccauley RN Registered Nurse   Ivelisse Sexton RN Registered Nurse                Placenta    Date/Time: 2/5/2022  4:22 AM  Removal: Spontaneous  Disposition: Hospital disposal           Anesthesia    Method: Epidural  Cervical dilation at placement: 4-7                Presentation and Position    Presentation: Vertex     Occiput Anterior                 Miriam Chun MD

## 2022-02-05 NOTE — PLAN OF CARE
Vital signs stable. Postpartum assessment WDL. Patient declined to take any tylenol and/or ibuprofen. Patient voiding without difficulty. Breastfeeding attempted. Baby not interested in breastfeeding. Baby spitty. Encouraged Patient to perform skin to skin with baby.  Patient and infant bonding well. Will continue with current plan of care.

## 2022-02-05 NOTE — PROGRESS NOTES
Florinda Keller arrived via ambulatory at 2110 and was admitted to 1219 for labor. Oriented to room, placed on monitor, call light within reach and instructed on use. Dr Chun notified of patient arrival and condition.

## 2022-02-05 NOTE — ANESTHESIA POSTPROCEDURE EVALUATION
Patient: Florinda Keller    Procedure: * No procedures listed *       Diagnosis:* No pre-op diagnosis entered *  Diagnosis Additional Information: No value filed.    Anesthesia Type:  No value filed.    Note:  Disposition: Inpatient   Postop Pain Control: Uneventful            Sign Out: Well controlled pain   PONV:    Neuro/Psych: Uneventful            Sign Out: Acceptable/Baseline neuro status   Airway/Respiratory: Uneventful            Sign Out: Acceptable/Baseline resp. status   CV/Hemodynamics: Uneventful            Sign Out: Acceptable CV status   Other NRE: NONE   DID A NON-ROUTINE EVENT OCCUR?     Event details/Postop Comments:  Patient evaluated after epidural follow-up and specifically denies severe headache, severe lower back pain, saddle anesthesia, loss of bowel/bladder function or other major complications.  Ambulating as expected.             Last vitals:  Vitals Value Taken Time   BP     Temp     Pulse     Resp     SpO2         Electronically Signed By: Hamlet Sanders MD  February 5, 2022  4:47 PM

## 2022-02-06 PROCEDURE — 250N000013 HC RX MED GY IP 250 OP 250 PS 637: Performed by: SPECIALIST

## 2022-02-06 PROCEDURE — 120N000012 HC R&B POSTPARTUM

## 2022-02-06 RX ADMIN — DOCUSATE SODIUM 100 MG: 100 CAPSULE, LIQUID FILLED ORAL at 07:48

## 2022-02-06 RX ADMIN — IBUPROFEN 800 MG: 400 TABLET, FILM COATED ORAL at 07:48

## 2022-02-06 RX ADMIN — LEVOTHYROXINE SODIUM 88 MCG: 88 TABLET ORAL at 07:48

## 2022-02-06 ASSESSMENT — ACTIVITIES OF DAILY LIVING (ADL)
ADLS_ACUITY_SCORE: 4

## 2022-02-06 NOTE — LACTATION NOTE
Initial visit with Florinda and baby Zoë. Florinda shares that infant has been spitty and they were really nervous about it overnight. Because Zoë has been spitty, she is not always interested in eating. At time of visit, diaper changed; she's awake, alert and eager to feed. Florinda gets herself positioned; using breastfeeding pillow and a support under breast. She has large breasts and nipples kadeem.    Assisted in bringing Zoë to breast; she prefers to keep her mouth closed. She is able to latch deeply for a few suckles before slipping back to tip of nipple. After several attempts, recommend that she continues using nipple shield. Zoë brought into breast with nipple shield and is able to attain latch - nutritive suckling pattern noted. She suckles for approximately 10 minutes and then falls asleep.    Recommend that Florinda continues to use breastpump after each feeding session. She is taking home a pump for home use.     Review how to deal with engorgement and when to introduce bottles. Encourage continuing to track feedings and diapers.    Radha Sevilla RN, IBCLC

## 2022-02-06 NOTE — PROGRESS NOTES
Post-Partum Progress Note          Assessment and Plan:    Assessment:   Post-partum day #1  Normal spontaneous vaginal delivery     Doing well.      Plan:   Routine pp care  Anticipate discharge 2/7/22             Significant Problems:    None  Baby did have a dusky episode last night per RN.   Would like more help with nursing today.           Review of Systems:    The patient denies any chest pain, shortness of breath, excessive pain, fever, chills, purulent drainage from the wound, nausea or vomiting.             Physical Exam:   All vitals stable  Uterine fundus is firm, non-tender and at the level of the umbilicus          Data:     Hemoglobin   Date Value Ref Range Status   02/04/2022 11.4 (L) 11.7 - 15.7 g/dL Final   ]      Miriam Chun MD

## 2022-02-06 NOTE — PLAN OF CARE
Fundus firm and bleeding wnl.  VSS.  Voiding without difficulty.  Perineum swollen and using ice and tucks.  Denies pain medication.  Started mom pumping.  Encouraged to call with questions or concerns.  Will continue to monitor.

## 2022-02-06 NOTE — PLAN OF CARE
Fundus firm and bleeding wnl.  VSS.  Voiding without difficulty.  Up independently.  Declined pain meds. Using ice and tucks.  Pumping. Encouraged to call with questions or concerns.

## 2022-02-06 NOTE — PLAN OF CARE
Vital signs stable. Postpartum assessment WDL. Pain controlled with tylenol and ibuprofen. Patient voiding without difficulty. Still working on breastfeeding. Baby sleepy at the breasts. Encouraged Patient to pump after breastfeeding attempted.  Patient and infant bonding well. Will continue with current plan of care.

## 2022-02-07 VITALS
RESPIRATION RATE: 16 BRPM | SYSTOLIC BLOOD PRESSURE: 126 MMHG | BODY MASS INDEX: 32.89 KG/M2 | TEMPERATURE: 98.2 F | DIASTOLIC BLOOD PRESSURE: 77 MMHG | HEART RATE: 79 BPM | OXYGEN SATURATION: 99 % | WEIGHT: 217 LBS | HEIGHT: 68 IN

## 2022-02-07 PROCEDURE — 250N000013 HC RX MED GY IP 250 OP 250 PS 637: Performed by: SPECIALIST

## 2022-02-07 RX ORDER — IBUPROFEN 600 MG/1
600 TABLET, FILM COATED ORAL EVERY 6 HOURS PRN
Qty: 30 TABLET | Refills: 0 | Status: SHIPPED | OUTPATIENT
Start: 2022-02-07

## 2022-02-07 RX ORDER — ROPIVACAINE HYDROCHLORIDE 2 MG/ML
INJECTION, SOLUTION EPIDURAL; INFILTRATION; PERINEURAL
Status: COMPLETED | OUTPATIENT
Start: 2022-02-05 | End: 2022-02-05

## 2022-02-07 RX ORDER — ACETAMINOPHEN 325 MG/1
650 TABLET ORAL EVERY 4 HOURS PRN
Qty: 100 TABLET | Refills: 0 | Status: SHIPPED | OUTPATIENT
Start: 2022-02-07

## 2022-02-07 RX ADMIN — LEVOTHYROXINE SODIUM 88 MCG: 88 TABLET ORAL at 08:12

## 2022-02-07 RX ADMIN — DOCUSATE SODIUM 100 MG: 100 CAPSULE, LIQUID FILLED ORAL at 08:12

## 2022-02-07 ASSESSMENT — ACTIVITIES OF DAILY LIVING (ADL)
ADLS_ACUITY_SCORE: 4

## 2022-02-07 NOTE — PLAN OF CARE
D: VSS, assessments WDL.   I: Pt. received complete discharge paperwork   Pt. was given times of last dose for all discharge medications in writing on discharge medication sheets.  Discharge teaching included home medication, pain management, activity restrictions, postpartum cares, and signs and symptoms of infection.    A: Discharge outcomes on care plan met.  Mother states understanding and comfort with self care and follow up care.   P: Pt. Discharged.  Pt. was accompanied by Spouse  and left with personal belongings.  Home care sent.  Pt. to follow up with OB provider per discharge instructions.  Pt. had no further questions at the time of discharge and no unmet needs were identified.

## 2022-02-07 NOTE — PLAN OF CARE
Vital signs stable. Postpartum assessment WDL. Tylenol and ibuprofen offered for pain control, patient declined need for any medication. Patient voiding without difficulty. Breastfeeding on cue with minimal assist, using nipple shield. Pumping post feed and supplementing with maternal and donor milk as needed. Encouraged to ask questions. Patient and infant bonding well. Will continue with current plan of care.

## 2022-02-07 NOTE — ANESTHESIA PREPROCEDURE EVALUATION
Anesthesia Pre-Procedure Evaluation    Patient: Florinda Keller   MRN: [id number] : 1980        Preoperative Diagnosis: * No pre-op diagnosis entered *    Procedure : * No procedures listed *          Past Medical History:   Diagnosis Date     Thyroid disease       Past Surgical History:   Procedure Laterality Date     THYROIDECTOMY       wisdom teeth        No Known Allergies   Social History     Tobacco Use     Smoking status: Never Smoker     Smokeless tobacco: Never Used   Substance Use Topics     Alcohol use: Not Currently      Wt Readings from Last 1 Encounters:   22 98.4 kg (217 lb)           Physical Exam    Airway        Mallampati: II       Respiratory Devices and Support         Dental           Cardiovascular   cardiovascular exam normal          Pulmonary   pulmonary exam normal                OUTSIDE LABS:  CBC:   Lab Results   Component Value Date    WBC 10.8 2022    HGB 11.4 (L) 2022    HCT 35.4 2022     2022     BMP: No results found for: NA, POTASSIUM, CHLORIDE, CO2, BUN, CR, GLC  COAGS: No results found for: PTT, INR, FIBR  POC: No results found for: BGM, HCG, HCGS  HEPATIC: No results found for: ALBUMIN, PROTTOTAL, ALT, AST, GGT, ALKPHOS, BILITOTAL, BILIDIRECT, CARLO  OTHER: No results found for: PH, LACT, A1C, SJ, PHOS, MAG, LIPASE, AMYLASE, TSH, T4, T3, CRP, SED    Anesthesia Plan    ASA Status:  2      Anesthesia Type: Epidural.              Consents    Anesthesia Plan(s) and associated risks, benefits, and realistic alternatives discussed. Questions answered and patient/representative(s) expressed understanding.    - Discussed:     - Discussed with:  Patient         Postoperative Care            Comments:                EVANGELINA RODARTE MD

## 2022-02-07 NOTE — ANESTHESIA PROCEDURE NOTES
Epidural catheter Procedure Note    Pre-Procedure   Staff -        Anesthesiologist:  Hugo Griffith MD       Performed By: anesthesiologist       Location: OB       Pre-Anesthestic Checklist: patient identified, IV checked, risks and benefits discussed, informed consent, monitors and equipment checked, pre-op evaluation and at physician/surgeon's request  Timeout:       Correct Patient: Yes        Correct Procedure: Yes        Correct Site: Yes        Correct Position: Yes   Procedure Documentation  Procedure: epidural catheter       Patient Position: sitting       Patient Prep/Sterile Barriers: sterile gloves, mask, patient draped       Skin prep: Betadine      Local skin infiltrated with mL of 1% lidocaine.        Insertion Site: L4-5. (midline approach).       Technique: LORT saline        MONIKA at 5 cm.       Needle Type: retsCloudy needle       Needle Gauge: 17.        Needle Length (Inches): 3.5         Catheter threaded easily.         Threaded 10 cm at skin.        # of attempts: 1 and  # of redirects:     Assessment/Narrative         Paresthesias: No.      Test dose of 3 mL lidocaine 1.5% w/ 1:200,000 epinephrine at.         Test dose negative, 3 minutes after injection, for signs of intravascular, subdural, or intrathecal injection.       Insertion/Infusion Method: LORT saline       Aspiration negative for Heme or CSF via Epidural Catheter.    Medication(s) Administered   0.2% Ropivacaine (Epidural), 10 mL  Medication Administration Time: 2/5/2022 10:20 PM     Comments:  No complications.  Catheter secured with sterile dressing and tape.  Questions answered.    Orders to manage the epidural infusion have been entered and, through coordination with the nurse, we will continue to manage and monitor the patient's labor epidural.  We will continuously be available to adjust as needed throughout the entire labor and delivery process.

## 2022-02-07 NOTE — PROGRESS NOTES
"POSTPARTUM NOTE, VAGINAL DELIVERY    POSTPARTUM DAY # 2    Patient reports feeling well.    No concerns.  Denies heavy bleeding.  Infant, Zoë, is well.      PE  Vitals: /68   Pulse 65   Temp 98.5  F (36.9  C) (Oral)   Resp 16   Ht 1.727 m (5' 8\")   Wt 98.4 kg (217 lb)   LMP 04/24/2021   SpO2 99%   Breastfeeding Unknown   BMI 32.99 kg/m        General - appears well  Abdomen - firm, nontender, below umbilicus  Extremities -  Nontender, bilat 1+ LE edema noted      A: PPD #2 s/p vaginal delivery.    P: Discharge  Today.  PP visits at 2 + 6 weeks.        Aleja Rawls MD  [facility]  2/7/22          "

## 2022-02-07 NOTE — DISCHARGE SUMMARY
[facility] Discharge Summary    Florinda Keller MRN# [id number]   Age: 41 year old YOB: 1980     Date of Admission:  2/4/2022  Date of Discharge::  2/7/2022  Admitting Physician:  Miriam Chun MD  Discharge Physician:  Aleja Rawls MD     Home clinic: [facility]          Admission Diagnoses:   Indication for care in labor or delivery [O75.9]          Discharge Diagnosis:   Intrauterine pregnancy at 41 0/7 weeks gestation  Vaginal delivery          Procedures:   Procedure(s): Repair 2nd degree laceration                  Medications Prior to Admission:     Medications Prior to Admission   Medication Sig Dispense Refill Last Dose     levothyroxine (SYNTHROID/LEVOTHROID) 88 MCG tablet Take 88 mcg by mouth daily   2/4/2022 at Unknown time     Prenatal Vit-Fe Fumarate-FA (PNV PRENATAL PLUS MULTIVITAMIN) 27-1 MG TABS per tablet Take 1 tablet by mouth daily   2/4/2022 at Unknown time             Discharge Medications:     Current Discharge Medication List      START taking these medications    Details   acetaminophen (TYLENOL) 325 MG tablet Take 2 tablets (650 mg) by mouth every 4 hours as needed for mild pain or fever  Qty: 100 tablet, Refills: 0    Associated Diagnoses: Vaginal delivery      ibuprofen (ADVIL/MOTRIN) 600 MG tablet Take 1 tablet (600 mg) by mouth every 6 hours as needed for other (cramping)  Qty: 30 tablet, Refills: 0    Associated Diagnoses: Vaginal delivery         CONTINUE these medications which have NOT CHANGED    Details   levothyroxine (SYNTHROID/LEVOTHROID) 88 MCG tablet Take 88 mcg by mouth daily      Prenatal Vit-Fe Fumarate-FA (PNV PRENATAL PLUS MULTIVITAMIN) 27-1 MG TABS per tablet Take 1 tablet by mouth daily                   Consultations:   No consultations were requested during this admission          Brief History of Labor:   Presented in labor.    Uncomplicated vaginal delivery.  Repair 2nd degree laceration.             Hospital Course:   The  patient's hospital course was unremarkable.  She was discharged on post-partum day #2.      Hemoglobin   Date Value Ref Range Status   02/04/2022 11.4 (L) 11.7 - 15.7 g/dL Final             Discharge Instructions and Follow-Up:   Discharge diet: Regular   Discharge activity: Pelvic rest: abstain from intercourse and do not use tampons for 6 week(s)   Discharge follow-up: Follow up with [facility] provider in 2 and 6  weeks           Discharge Disposition:   Discharged to home      Attestation:  I have reviewed today's vital signs, notes, medications, labs and imaging.    Aleja Rawls MD   2/7/22

## 2022-02-07 NOTE — PLAN OF CARE
Vital signs stable. Postpartum assessment WDL. Pain well controlled denies pain meds now  Patient up ambulating voiding without difficulty. Breastfeeding well  Patient and infant bonding well.discharge today  Will continue with current plan of care.

## 2022-02-07 NOTE — LACTATION NOTE
"Lactation visit with Florinda and sherry Camp. Florinda was very emotional at the time of our visit. She shares she is feeling confused/overhwhelmed about their feeding plan. . . Should she keep pumping, do they need donor breast milk, she was also suggested formula for supplementation. Infant was very spitty and sleepy in her first 24 hours, so Florinda had started pumping. It sounds like supplementation was started around the same time (DBM). Infant clusterfed overnight and then Florinda resumed pumping after BF early this morning. Infant's weight loss was 8.8% on day 2, but she is now feeding well and is having appropriate wet/poopy diapers for her GA. There are currently no medical indicators of continuing supplementation (and pumping).      recommends to breastfeed on demand and waking infant if it has been three hours from her last breastfeeding session. Continue to track feedings along with wet/poopy diapers.    Assisted with a breastfeeding session at time of visit. Florinda has large breasts and has needed a nipple shield to get infant latched well. Florinda very comfortable with getting herself into position for breastfeeding, she has a breastfeeding pillow and places the shield easily. Infant suckles for a short time before falling asleep, which  shares is normal after a busy night of clusterfeeding. Milk visible in the shield when infant unlatches.  practices hand expression with Florinda, able to produces lots of drops of colostrum, which we collected with a plastic spoon and fed back to sherry Camp.      Offered a lot of positive reinforcement and encouragement!     Reviewed breastfeeding positions and techniques to obtain/maintain deep latch (including nose to nipple alignment and supporting infant's shoulder blades vs head when bringing infant in to latch). Discussed BF should feel like a strong \"tug or pull\" when infant is suckling and if mother experiences a \"pinching or biting\" sensation, how to un-latch infant " "properly, assess nipple shape and make any necessary adjustments with positioning before re-latching.     Discussed physiology of milk production from colostrum through milk coming in and how the breasts should begin to feel \"heavy or full\" between day 3-5. Answered questions regarding \"how to know when infant is done at the breast\". Educated to infant satiety signs; encouraged listening for audible swallows along with watching for changes in infant's stool color. Discussed normal infant weight loss and when infant should be back to birth weight. Stressed the importance of continuing to track infant's feeds and void/stools patterns, at least until infant has returned to his birth weight.    Discussed pumping (when it's helpful, when it's necessary, and when to begin pumping for milk storage), along with using a [place].  Florinda has a new breast pump for home use. Suggested \"Guide to Postpartum and [place] Care\" handbook is a great resource going forward for topics that include engorgement, plugged milk ducts, mastitis, safe sleep, and safety of baby.     Feeding plan recommendations: provide unlimited, on-demand breast feedings: At least 8-12 times/24 hours (reviewed early feeding cues). Encouraged on-going use of a feeding log or sanchez to record feedings along with void/stool patterns. Follow up with Pediatrician as requested and encouraged lactation follow up. Reviewed [place] outpatient lactation resources. Appreciative of visit.    Lucretia Goldsmith RN, IBCLC            "

## 2022-06-05 ENCOUNTER — HEALTH MAINTENANCE LETTER (OUTPATIENT)
Age: 42
End: 2022-06-05

## 2022-10-16 ENCOUNTER — HEALTH MAINTENANCE LETTER (OUTPATIENT)
Age: 42
End: 2022-10-16

## 2023-06-17 ENCOUNTER — HEALTH MAINTENANCE LETTER (OUTPATIENT)
Age: 43
End: 2023-06-17

## 2024-01-15 LAB — PAP SMEAR - HIM PATIENT REPORTED: NEGATIVE

## 2024-03-23 ENCOUNTER — HEALTH MAINTENANCE LETTER (OUTPATIENT)
Age: 44
End: 2024-03-23

## 2024-08-10 ENCOUNTER — HEALTH MAINTENANCE LETTER (OUTPATIENT)
Age: 44
End: 2024-08-10

## 2025-01-15 LAB — PHQ9 SCORE: 0

## 2025-01-22 ENCOUNTER — TRANSFERRED RECORDS (OUTPATIENT)
Dept: HEALTH INFORMATION MANAGEMENT | Facility: CLINIC | Age: 45
End: 2025-01-22

## 2025-01-31 ENCOUNTER — TRANSFERRED RECORDS (OUTPATIENT)
Dept: HEALTH INFORMATION MANAGEMENT | Facility: CLINIC | Age: 45
End: 2025-01-31

## 2025-02-04 ENCOUNTER — TRANSFERRED RECORDS (OUTPATIENT)
Dept: HEALTH INFORMATION MANAGEMENT | Facility: CLINIC | Age: 45
End: 2025-02-04
Payer: COMMERCIAL

## 2025-02-10 ENCOUNTER — MEDICAL CORRESPONDENCE (OUTPATIENT)
Dept: HEALTH INFORMATION MANAGEMENT | Facility: CLINIC | Age: 45
End: 2025-02-10
Payer: COMMERCIAL

## 2025-02-11 ENCOUNTER — MYC MEDICAL ADVICE (OUTPATIENT)
Dept: SURGERY | Facility: CLINIC | Age: 45
End: 2025-02-11
Payer: COMMERCIAL

## 2025-02-11 DIAGNOSIS — C50.412 MALIGNANT NEOPLASM OF UPPER-OUTER QUADRANT OF LEFT BREAST IN FEMALE, ESTROGEN RECEPTOR POSITIVE (H): Primary | ICD-10-CM

## 2025-02-11 DIAGNOSIS — Z17.0 MALIGNANT NEOPLASM OF UPPER-OUTER QUADRANT OF LEFT BREAST IN FEMALE, ESTROGEN RECEPTOR POSITIVE (H): Primary | ICD-10-CM

## 2025-02-25 ENCOUNTER — OFFICE VISIT (OUTPATIENT)
Dept: SURGERY | Facility: CLINIC | Age: 45
End: 2025-02-25
Payer: COMMERCIAL

## 2025-02-25 VITALS
SYSTOLIC BLOOD PRESSURE: 116 MMHG | HEIGHT: 68 IN | OXYGEN SATURATION: 100 % | HEART RATE: 62 BPM | BODY MASS INDEX: 28.34 KG/M2 | RESPIRATION RATE: 16 BRPM | DIASTOLIC BLOOD PRESSURE: 64 MMHG | WEIGHT: 187 LBS

## 2025-02-25 DIAGNOSIS — Z17.0 MALIGNANT NEOPLASM OF UPPER-OUTER QUADRANT OF LEFT BREAST IN FEMALE, ESTROGEN RECEPTOR POSITIVE (H): Primary | ICD-10-CM

## 2025-02-25 DIAGNOSIS — C50.412 MALIGNANT NEOPLASM OF UPPER-OUTER QUADRANT OF LEFT BREAST IN FEMALE, ESTROGEN RECEPTOR POSITIVE (H): Primary | ICD-10-CM

## 2025-02-25 PROCEDURE — 3078F DIAST BP <80 MM HG: CPT | Performed by: SURGERY

## 2025-02-25 PROCEDURE — 3074F SYST BP LT 130 MM HG: CPT | Performed by: SURGERY

## 2025-02-25 PROCEDURE — 99417 PROLNG OP E/M EACH 15 MIN: CPT | Performed by: SURGERY

## 2025-02-25 PROCEDURE — 99205 OFFICE O/P NEW HI 60 MIN: CPT | Performed by: SURGERY

## 2025-02-25 NOTE — NURSING NOTE
Breast Nurse Care Coordination:      I introduced self to patient and explained my role of breast nurse coordinator. I accompanied Florinda to her surgical consultation on 2/25/25 with Dr. Shaikh at the [facility] [place].       Florinda was given the new breast cancer patient packet which includes educational material and support resources such as American Cancer Society, Fighting Cancer through Diet and Lifestyle, [employer], [employer], Pathways and the [facility] Breast Cancer Support Group.  I also enclosed a copy of her left breast biopsy pathology report (1/31/25).       At the end of the consultation, we reviewed Florinda's plan of care and education.  The plan is for patient to have a left breast mastectomy with left sentinel lymph node biopsy with reconstruction. Patient may also do a lift on the right breast. She is meeting with Dr. Bates tomorrow to discuss reconstruction further. Florinda is aware that she will need a pre-op exam with her PCP within 30 days before surgery.     Florinda see's Dr. Nicole for medical oncology at [facility]. I spoke with a RN at Dr. Nicole's office who is covering for Dr. Nicole's RN (Haydee) to see if the mammoprint is back yet. It is still pending. They will send the result to us when it is back. Fax number provided. Florinda is also scheduled for a CT/bone scan today at [facility].     I gave patient educational materials regarding Exercises After Breast Surgery and Mastectomy.  Informed patient for mastectomy surgery, we will provide her with a post mastectomy garment in the hospital.  We also discussed that a lanyard can be helpful for showering with the drains in place.      I answered her questions and encouraged patient to call me back with any future questions or concerns.  Florinda has my contact information, and knows to contact me in the future with any questions or concerns.     Kalie Torrez, RN, BSN, PHN  Breast Care Nurse Coordinator  [facility] [place]  [phone]

## 2025-02-25 NOTE — PROGRESS NOTES
Breast Health History Form    Do you have any of the following symptoms? (Patient-Rptd) Breast Pain     In which breast are you having the symptoms? (Patient-Rptd) Left  Have you had a mammogram? (Patient-Rptd) Yes  Where: (Patient-Rptd) [facility]  Date: (Patient-Rptd) 01/22/2025  Have you ever had a breast cyst drained? (Patient-Rptd) No        Have you had a breast biopsy in the past? (Patient-Rptd) Yes  Side: (Patient-Rptd) Left  Date: (Patient-Rptd) 01/31/2025  Have you ever had Breast Cancer? (Patient-Rptd) Yes  Side: (Patient-Rptd) Left  Date: (Patient-Rptd) 01/31/2025  Is there a history of Breast Cancer in your family? (Patient-Rptd) Yes  Relationship: (Patient-Rptd) Mother  Have you ever had Ovarian Cancer? (Patient-Rptd) No     Is there a history of Ovarian Cancer in your family? (Patient-Rptd) Yes  Relationship: (Patient-Rptd) Paternal Grandmother  Is there a history of Colon Cancer in your family? (Patient-Rptd) No     Is there a history of Uterine Cancer in your family? (Patient-Rptd) No     Any known genetic abnormalities in your family? (Patient-Rptd) No     Summarize your caffeine intake? (Patient-Rptd) Coffee. 1 cup daily    Were you born with a uterus? (Patient-Rptd) Yes  What age did your periods begin? (Patient-Rptd) 13  Date your last menstrual period began? (Patient-Rptd) 01/15/2025  Number of full term pregnancies? (Patient-Rptd) 1  Your age when your first child was born? (Patient-Rptd) 41  Did you nurse your children? (Patient-Rptd) Yes  Are you pregnant now? (Patient-Rptd) No  Have you begun Menopause? (Patient-Rptd) No     Have you had either ovary removed? (Patient-Rptd) No     Have you had an intrauterine device (IUD) placed? (Patient-Rptd) No         Do you have breast implants? (Patient-Rptd) No  Have you used hormone replacement therapy? (Patient-Rptd) No        Have you taken oral contraceptive pills? (Patient-Rptd) Yes  For how many years: (Patient-Rptd) 26  What is your current  bra size? (Patient-Rptd) DDD 36

## 2025-02-25 NOTE — PROGRESS NOTES
[facility] Breast Surgery Consultation    HPI:   Florinda Keller is a 44 year old female who is seen in consultation at the request of Dr. Chun for evaluation of newly diagnosed left breast invasive lobular carcinoma, grade 1 ER/[place] positive HER2 negative at 1:00, 9 cm from nipple measuring approximately 4 cm in size.    She reports she noticed a change in her breast in mid December.  She had noticed bruising across her upper breast at that time thought maybe it was from a toy that her daughter could have thrown at her.  Her daughter is 3 years old.  She then noticed changes in the breast appearance with it being smaller and lifted compared to her right breast.    She had diagnostic breast imaging due to a palpable mass in her left breast.  Ultrasound of the left breast revealed at 1:00, 9 cm from nipple a vague irregular hypoechoic mass that is difficult to measure but measures at least 4.3 cm in size.  The overlying skin is slightly thickened with contracture present concerning for a potential inflammatory process.  Left axillary ultrasound was negative.  She subsequently had a breast MRI for which results were available this morning.  Her MRI revealed abnormal non-mass enhancement measuring up to 12 cm occupying most of the anterior mid and lateral left breast.  No evidence of chest wall invasion.  No evidence of dermal enhancement.  Posterior medial breast 3:00 is an enhancing mass which is likely an intramammary node.  No concerns on the right breast.  Right axillary nodes were normal.  Internal mammary nodes are normal.  There was minimal cortical thickening of a low left axillary lymph node and ultrasound of this done previously had been benign.    She has not had any prior breast surgeries or breast biopsies.  She is otherwise very healthy.  She has had a thyroidectomy in the past due to an abnormal nodule but pathology was benign.  She did well with anesthesia.  She had genetic testing which  was negative.    She is currently using birth control pills.  Discussed that I would recommend stopping these as her tumor is ER/ID positive.  She can discuss contraception options that are not hormone-based further with Dr. Chun.      Hormonal history:  menarche 13, 1 children, 1st at age 41, pre-menopausal, current and for 26 years OCP use, no HRT, bra size DDD.     Family history of breast cancer: Yes -mother diagnosed at 74 with invasive ductal carcinoma, had lumpectomies at age 45  Family history of ovarian cancer:  Yes -paternal grandmother at 80  Family history of colon cancer: No  Family history of prostate cancer: No      Past Medical History:   has a past medical history of Thyroid disease.      Current Outpatient Medications:     acetaminophen (TYLENOL) 325 MG tablet, Take 2 tablets (650 mg) by mouth every 4 hours as needed for mild pain or fever, Disp: 100 tablet, Rfl: 0    ibuprofen (ADVIL/MOTRIN) 600 MG tablet, Take 1 tablet (600 mg) by mouth every 6 hours as needed for other (cramping), Disp: 30 tablet, Rfl: 0    levothyroxine (SYNTHROID/LEVOTHROID) 88 MCG tablet, Take 88 mcg by mouth daily, Disp: , Rfl:     Prenatal Vit-Fe Fumarate-FA (PNV PRENATAL PLUS MULTIVITAMIN) 27-1 MG TABS per tablet, Take 1 tablet by mouth daily, Disp: , Rfl:     Past Surgical History:  Past Surgical History:   Procedure Laterality Date    THYROIDECTOMY      wisdom teeth           No Known Allergies     Social History:  Social History     Socioeconomic History    Marital status:      Spouse name: Not on file    Number of children: Not on file    Years of education: Not on file    Highest education level: Not on file   Occupational History    Not on file   Tobacco Use    Smoking status: Never    Smokeless tobacco: Never   Substance and Sexual Activity    Alcohol use: Not Currently    Drug use: Never    Sexual activity: Yes     Partners: Male     Birth control/protection: None   Other Topics Concern    Not on file  "  Social History Narrative    Not on file     Social Drivers of Health     Financial Resource Strain: Not on file   Food Insecurity: Not on file   Transportation Needs: Not on file   Physical Activity: Not on file   Stress: Not on file   Social Connections: Not on file   Interpersonal Safety: Not on file   Housing Stability: Not on file        ROS:  The 10 point review of systems is negative other than noted in the HPI and above.    PE:  Vitals: /64   Pulse 62   Resp 16   Ht 1.727 m (5' 8\")   Wt 84.8 kg (187 lb)   SpO2 100%   BMI 28.43 kg/m    General appearance: well-nourished, sitting comfortably, no apparent distress  Psych: normal affect, pleasant  HEENT:  Head normocephalic and atraumatic, pupils equal and round, conjunctivae clear, mucous membranes moist, external ears and nose normal  Neck: Supple without thyromegaly or masses  Lungs: Respirations unlabored  Lymphatic: No cervical, or supraclavicular lymphadenopathy  Extremities: Without edema  Musculoskeletal:  Normal station and gait  Neurologic: nonfocal, grossly intact times four extremities, alert and oriented times three  Psychiatric: Mood and affect are appropriate  Skin: Without lesions or rashes    Breast:  A bilateral breast exam was performed in the supine position.. Bilateral breasts were palpated in a circumferential clockwise fashion including the supraclavicular and axillary areas.   Breasts are very asymmetric.  Left breast is lifted and smaller compared to right breast.  Skin is normal.  Nipples are everted and normal bilaterally.  There is a large firm mass occupying the majority of the left upper outer breast.  The margins are not discrete.  It is mobile from her chest wall.  No masses on her right breast.    Lymph:       No supraclavicular/infraclavicular adenopathy.   Axillary adenopathy: none    Assessment: left breast invasive lobular carcinoma, grade 1 ER/GA positive HER2 negative at 1:00, 9 cm from nipple measuring " approximately 4 cm in size.    Plan:   Florinda Keller is a 44 year old female has newly diagnosed left breast cancer.  I reviewed the imaging and pathology reports with her and explained the findings.  We talked about the fact that this is invasive lobular carcinoma  that is approximately 4cm  in size.   We discussed the receptor status. We discussed that breast cancer is treated in a multidisciplinary fashion and she will also meet with oncology as well as radiation oncology pending surgical decision making and final pathology results.     She has met with Dr.Uzma Nicole with [facility] and reports a MammaPrint is pending which I think is very helpful to guide our next steps.  If she is MammaPrint high risk, luminal B I would recommend consideration of neoadjuvant chemotherapy as this could help improve margins given the large tumor and decrease the number of nodes needed to be removed at the time of eventual surgery.  If she is MammaPrint low risk I would recommend surgery upfront as I would not expect a significant change with chemotherapy and her tumor.  We will keep an eye out for her MammaPrint results.    She also has a CT chest abdomen pelvis and bone scan  that we will watch for the results for her as well.    We next discussed the surgical options for treatment.  She is unfortunately not a candidate for breast conservation due to the large size of this tumor relative to her breast.    I advised that lymph node biopsy is recommended whenever we are dealing with invasive breast cancer and described the procedure for sentinel lymph node biopsy.  We discussed the use of technetium, blue dye or ICG 9 green dye to identify sentinel nodes. We talked about the risk for lymphedema which is small with removal of only a few nodes, but not zero.  We did discuss the option of a follow-up ultrasound potential biopsy of this node noted to be inconclusive on MRI but ultimately with ultrasound being more  sensitive for aubrey evaluation and if MammaPrint is low risk I would recommend surgery upfront regardless of a biopsy results from this node.    We discussed the role of adjuvant radiation following mastectomy and with the tumor size I would expect this will be recommended however would wait until we have final pathology results to determine this.    We discussed the various types of mastectomy, including simple with aesthetic flat closure, skin-sparing, and nipple-sparing mastectomy. We also talked about post-mastectomy reconstruction and the stages involved.  We reviewed that the nipple-sparing technique is cosmetic; sensation and contractility will likely be lost.  Florinda is not a candidate for nipple-sparing mastectomy from an oncologic perspective.    The option of having immediate versus delayed reconstruction was also discussed.   We reviewed that the advantages of immediate reconstruction includes superior cosmetics, as the skin is preserved.      We did discuss the option of prophylactic right mastectomy.  We discussed that removal of her right breast has no impact on her survival related to the left-sided breast cancer but can reduce her risk for cancer going forward.  We discussed there is increased surgical risk as it is more surgery.  We discussed that is a very personal decision with her genetics being negative it is an option. she can keep her breast and we discussed pros and cons to each scenario.  We did discuss alternating mammogram and MRI if she were to keep her right breast for high risk screening given her breast density and lobular type of cancer.        Plan:   Follow-up MammaPrint result  Follow-ups results from CT chest abdomen pelvis and bone scan  Plastic surgery referral (Dr. Ibarra tomorrow)   Tentative plan for left mastectomy with left sentinel lymph node biopsy    75 minutes total time spent on the date of this encounter doing: chart review, review of test results, patient visit,  physical exam, education, counseling, developing plan of care, and documenting.    Nancy Shaikh MD      Please route or send letter to:  Primary Care Provider (PCP) and Referring Provider

## 2025-02-25 NOTE — LETTER
February 27, 2025          Miriam Chun MD  [address] [facility] [address]  [place] [address]      RE:   Florinda Keller 1980      Dear Colleague,    Thank you for referring your patient, Florinda Keller, to [facility]. Please see a copy of my visit note below.     Florinda Keller is a 44 year old female who is seen in consultation at the request of Dr. Chun for evaluation of newly diagnosed left breast invasive lobular carcinoma, grade 1 ER/IN positive HER2 negative at 1:00, 9 cm from nipple measuring approximately 4 cm in size.     She reports she noticed a change in her breast in mid December.  She had noticed bruising across her upper breast at that time thought maybe it was from a toy that her daughter could have thrown at her.  Her daughter is 3 years old.  She then noticed changes in the breast appearance with it being smaller and lifted compared to her right breast.     She had diagnostic breast imaging due to a palpable mass in her left breast.  Ultrasound of the left breast revealed at 1:00, 9 cm from nipple a vague irregular hypoechoic mass that is difficult to measure but measures at least 4.3 cm in size.  The overlying skin is slightly thickened with contracture present concerning for a potential inflammatory process.  Left axillary ultrasound was negative.  She subsequently had a breast MRI for which results were available this morning.  Her MRI revealed abnormal non-mass enhancement measuring up to 12 cm occupying most of the anterior mid and lateral left breast.  No evidence of chest wall invasion.  No evidence of dermal enhancement.  Posterior medial breast 3:00 is an enhancing mass which is likely an intramammary node.  No concerns on the right breast.  Right axillary nodes were normal.  Internal mammary nodes are normal.  There was minimal cortical thickening of a low left axillary lymph node and ultrasound of this done  previously had been benign.     She has not had any prior breast surgeries or breast biopsies.  She is otherwise very healthy.  She has had a thyroidectomy in the past due to an abnormal nodule but pathology was benign.  She did well with anesthesia.  She had genetic testing which was negative.     She is currently using birth control pills.  Discussed that I would recommend stopping these as her tumor is ER/[place] positive.  She can discuss contraception options that are not hormone-based further with Dr. Chun.        Hormonal history:  menarche 13, 1 children, 1st at age 41, pre-menopausal, current and for 26 years OCP use, no HRT, bra size DDD.      Family history of breast cancer: Yes -mother diagnosed at 74 with invasive ductal carcinoma, had lumpectomies at age 45  Family history of ovarian cancer:  Yes -paternal grandmother at 80  Family history of colon cancer: No  Family history of prostate cancer: No        Past Medical History:   has a past medical history of Thyroid disease.       Current Medications      Current Outpatient Medications:     acetaminophen (TYLENOL) 325 MG tablet, Take 2 tablets (650 mg) by mouth every 4 hours as needed for mild pain or fever, Disp: 100 tablet, Rfl: 0    ibuprofen (ADVIL/MOTRIN) 600 MG tablet, Take 1 tablet (600 mg) by mouth every 6 hours as needed for other (cramping), Disp: 30 tablet, Rfl: 0    levothyroxine (SYNTHROID/LEVOTHROID) 88 MCG tablet, Take 88 mcg by mouth daily, Disp: , Rfl:     Prenatal Vit-Fe Fumarate-FA (PNV PRENATAL PLUS MULTIVITAMIN) 27-1 MG TABS per tablet, Take 1 tablet by mouth daily, Disp: , Rfl:         Past Surgical History:  Past Surgical History         Past Surgical History:   Procedure Laterality Date    THYROIDECTOMY        wisdom teeth                               Allergies   No Known Allergies               Social History:  Social History   Social History            Socioeconomic History    Marital status:        Spouse name: Not on  "file    Number of children: Not on file    Years of education: Not on file    Highest education level: Not on file   Occupational History    Not on file   Tobacco Use    Smoking status: Never    Smokeless tobacco: Never   Substance and Sexual Activity    Alcohol use: Not Currently    Drug use: Never    Sexual activity: Yes       Partners: Male       Birth control/protection: None   Other Topics Concern    Not on file   Social History Narrative    Not on file      Social Drivers of Health      Financial Resource Strain: Not on file   Food Insecurity: Not on file   Transportation Needs: Not on file   Physical Activity: Not on file   Stress: Not on file   Social Connections: Not on file   Interpersonal Safety: Not on file   Housing Stability: Not on file                        ROS:  The 10 point review of systems is negative other than noted in the HPI and above.     PE:  Vitals: /64   Pulse 62   Resp 16   Ht 1.727 m (5' 8\")   Wt 84.8 kg (187 lb)   SpO2 100%   BMI 28.43 kg/m    General appearance: well-nourished, sitting comfortably, no apparent distress  Psych: normal affect, pleasant  HEENT:  Head normocephalic and atraumatic, pupils equal and round, conjunctivae clear, mucous membranes moist, external ears and nose normal  Neck: Supple without thyromegaly or masses  Lungs: Respirations unlabored  Lymphatic: No cervical, or supraclavicular lymphadenopathy  Extremities: Without edema  Musculoskeletal:  Normal station and gait  Neurologic: nonfocal, grossly intact times four extremities, alert and oriented times three  Psychiatric: Mood and affect are appropriate  Skin: Without lesions or rashes     Breast:  A bilateral breast exam was performed in the supine position.. Bilateral breasts were palpated in a circumferential clockwise fashion including the supraclavicular and axillary areas.   Breasts are very asymmetric.  Left breast is lifted and smaller compared to right breast.  Skin is normal.  Nipples " are everted and normal bilaterally.  There is a large firm mass occupying the majority of the left upper outer breast.  The margins are not discrete.  It is mobile from her chest wall.  No masses on her right breast.     Lymph:                            No supraclavicular/infraclavicular adenopathy.               Axillary adenopathy: none     Assessment: left breast invasive lobular carcinoma, grade 1 ER/[place] positive HER2 negative at 1:00, 9 cm from nipple measuring approximately 4 cm in size.     Plan:   Florinda Keller is a 44 year old female has newly diagnosed left breast cancer.  I reviewed the imaging and pathology reports with her and explained the findings.  We talked about the fact that this is invasive lobular carcinoma  that is approximately 4cm  in size.   We discussed the receptor status. We discussed that breast cancer is treated in a multidisciplinary fashion and she will also meet with oncology as well as radiation oncology pending surgical decision making and final pathology results.      She has met with Dr.Uzma Nicole with [facility] and reports a MammaPrint is pending which I think is very helpful to guide our next steps.  If she is MammaPrint high risk, luminal B I would recommend consideration of neoadjuvant chemotherapy as this could help improve margins given the large tumor and decrease the number of nodes needed to be removed at the time of eventual surgery.  If she is MammaPrint low risk I would recommend surgery upfront as I would not expect a significant change with chemotherapy and her tumor.  We will keep an eye out for her MammaPrint results.     She also has a CT chest abdomen pelvis and bone scan  that we will watch for the results for her as well.     We next discussed the surgical options for treatment.  She is unfortunately not a candidate for breast conservation due to the large size of this tumor relative to her breast.     I advised that lymph node biopsy is  recommended whenever we are dealing with invasive breast cancer and described the procedure for sentinel lymph node biopsy.  We discussed the use of technetium, blue dye or ICG 9 green dye to identify sentinel nodes. We talked about the risk for lymphedema which is small with removal of only a few nodes, but not zero.  We did discuss the option of a follow-up ultrasound potential biopsy of this node noted to be inconclusive on MRI but ultimately with ultrasound being more sensitive for aubrey evaluation and if MammaPrint is low risk I would recommend surgery upfront regardless of a biopsy results from this node.     We discussed the role of adjuvant radiation following mastectomy and with the tumor size I would expect this will be recommended however would wait until we have final pathology results to determine this.     We discussed the various types of mastectomy, including simple with aesthetic flat closure, skin-sparing, and nipple-sparing mastectomy. We also talked about post-mastectomy reconstruction and the stages involved.  We reviewed that the nipple-sparing technique is cosmetic; sensation and contractility will likely be lost.  Florinda is not a candidate for nipple-sparing mastectomy from an oncologic perspective.    The option of having immediate versus delayed reconstruction was also discussed.   We reviewed that the advantages of immediate reconstruction includes superior cosmetics, as the skin is preserved.       We did discuss the option of prophylactic right mastectomy.  We discussed that removal of her right breast has no impact on her survival related to the left-sided breast cancer but can reduce her risk for cancer going forward.  We discussed there is increased surgical risk as it is more surgery.  We discussed that is a very personal decision with her genetics being negative it is an option. she can keep her breast and we discussed pros and cons to each scenario.  We did discuss alternating  mammogram and MRI if she were to keep her right breast for high risk screening given her breast density and lobular type of cancer.         Plan:   Follow-up MammaPrint result  Follow-ups results from CT chest abdomen pelvis and bone scan  Plastic surgery referral (Dr. Ibarra tomorrow)   Tentative plan for left mastectomy with left sentinel lymph node biopsy    Again, thank you for allowing me to participate in the care of your patient.      Sincerely,      Nancy Shaikh MD

## 2025-02-27 ENCOUNTER — TELEPHONE (OUTPATIENT)
Dept: SURGERY | Facility: CLINIC | Age: 45
End: 2025-02-27
Payer: COMMERCIAL

## 2025-02-27 ENCOUNTER — PREP FOR PROCEDURE (OUTPATIENT)
Dept: SURGERY | Facility: CLINIC | Age: 45
End: 2025-02-27
Payer: COMMERCIAL

## 2025-02-27 DIAGNOSIS — C50.412 MALIGNANT NEOPLASM OF UPPER-OUTER QUADRANT OF LEFT BREAST IN FEMALE, ESTROGEN RECEPTOR POSITIVE (H): Primary | ICD-10-CM

## 2025-02-27 DIAGNOSIS — Z17.0 MALIGNANT NEOPLASM OF UPPER-OUTER QUADRANT OF LEFT BREAST IN FEMALE, ESTROGEN RECEPTOR POSITIVE (H): Primary | ICD-10-CM

## 2025-02-27 NOTE — TELEPHONE ENCOUNTER
Patient is wondering if her mammaprint is back yet (pending/ordered by [facility]). I discussed that I called their office on 2/25/25 and they informed me that the mammaprint is still pending but that they will fax the result to us when it is available.     I asked Florinda to let me know if they contact her as well with the result, so I can update Dr. Shaikh. She is agreeable to this.     Kalie Torrez, RN, BSN, PHN  Breast Care Nurse Coordinator  [facility]- [facility]- [place]

## 2025-02-27 NOTE — CONFIDENTIAL NOTE
I called Florinda and discussed that Dr. Nicole had relayed that her mammaprint returned as high risk. We discussed the option of neoadjuvant chemotherapy and the benefits of this from my perspective with high risk disease. She is overwhelmed understandably. I sent a message to Dr. Nicole to hopefully be able to see her for a clinic visit to review the neoadjuvant option. We will wait for now for her to decide but we did discuss port placement and I will enter orders for this.     Nancy Shaikh MD  [facility]  [phone]

## 2025-02-27 NOTE — TELEPHONE ENCOUNTER
Name of caller: Patient    Reason for Call:  Patient is scheduled mastectomy & reconstruction on 3/19/25 and would like to go over return to work guidelines/restrictions and discuss paperwork for her employer    Surgeon:  Nancy Shaikh MD    Recent Surgery:  No    If yes, when & what type:  N/A      Best phone number to reach pt at is: [phone]   Ok to leave a message with medical info? Yes.

## 2025-02-27 NOTE — TELEPHONE ENCOUNTER
I left a voicemail message addressing her question. She has my number to return my call with any additional questions.     Kalie Torrez, RN, BSN, PHN  Breast Care Nurse Coordinator  [facility] [place] [place] [facility] [place]

## 2025-02-28 ENCOUNTER — TRANSFERRED RECORDS (OUTPATIENT)
Dept: HEALTH INFORMATION MANAGEMENT | Facility: CLINIC | Age: 45
End: 2025-02-28
Payer: COMMERCIAL

## 2025-03-03 ENCOUNTER — TRANSFERRED RECORDS (OUTPATIENT)
Dept: HEALTH INFORMATION MANAGEMENT | Facility: CLINIC | Age: 45
End: 2025-03-03
Payer: COMMERCIAL

## 2025-03-03 NOTE — CONFIDENTIAL NOTE
I discussed Florinda's care with Dr. Esthela Geller with [facility] and he feels if her left axillary lymph node is involved it would change his chemotherapy regimen. I will place orders for US and biopsy of the abnormal left axillary node.     Nancy Shaikh MD  [facility]  [phone]

## 2025-03-05 ENCOUNTER — TELEPHONE (OUTPATIENT)
Dept: SURGERY | Facility: CLINIC | Age: 45
End: 2025-03-05
Payer: COMMERCIAL

## 2025-03-05 ENCOUNTER — TELEPHONE (OUTPATIENT)
Dept: MAMMOGRAPHY | Facility: CLINIC | Age: 45
End: 2025-03-05
Payer: COMMERCIAL

## 2025-03-05 ENCOUNTER — PATIENT OUTREACH (OUTPATIENT)
Dept: ONCOLOGY | Facility: CLINIC | Age: 45
End: 2025-03-05
Payer: COMMERCIAL

## 2025-03-05 DIAGNOSIS — C50.412 MALIGNANT NEOPLASM OF UPPER-OUTER QUADRANT OF LEFT BREAST IN FEMALE, ESTROGEN RECEPTOR POSITIVE (H): Primary | ICD-10-CM

## 2025-03-05 DIAGNOSIS — Z17.0 MALIGNANT NEOPLASM OF UPPER-OUTER QUADRANT OF LEFT BREAST IN FEMALE, ESTROGEN RECEPTOR POSITIVE (H): Primary | ICD-10-CM

## 2025-03-05 NOTE — TELEPHONE ENCOUNTER
Florinda called with some questions regarding the decision to pursue neoadjuvant chemotherapy. She is scheduled for a port placement on 3/12/25 with Dr. Shaikh.     She has met with Dr. Nicole who is now recommending neoadjuvant chemotherapy due to the results of the mammoprint.     She recently met with Dr. Geller at [facility] who is sure if he'd recommend chemo upfront (or chemo at all) due to her age and some of the mammoprint results. She also has some family history of heart issues on her dad's side, which was one reason why he was hesitant. According to Florinda, both the medical oncologists were going to discuss to try to come to a consensus. She hasn't heard back yet.     She isn't even really sure how on board she is with chemotherapy. She can feel this mass and would feel better having it removed. She really just wants to get the plan moving along.     She would like Dr. Shaikh's input on chemotherapy first vs surgery. She is also open to meeting with one of our medical oncologists (if prior to her port placement) if Dr. Shaikh would recommend their input.     She also had a question on if we are recommending chemotherapy first just for her to pursue reconstruction with a mastectomy. We discussed the reconstruction is separate from the decision regarding chemotherapy upfront.     Will route to Dr. Shaikh to advise on next steps.     Kalie Torrez, RN, BSN, PHN  Breast Care Nurse Coordinator  [facility] [place] [place]

## 2025-03-05 NOTE — TELEPHONE ENCOUNTER
Request for slides sent via fax # [phone] to [facility] Department for Pathology Case: [id number].    Aisha Dorman, RN, BSN, PHN, CBCN  Breast Nurse Coordinator  [facility]  [phone]

## 2025-03-05 NOTE — TELEPHONE ENCOUNTER
I called Florinda to discuss that Dr. Shaikh would like her to meet with Dr. Tariq and have her case discussed at tumor conference on 3/12/25.     I will place a referral as well for medical oncology, in case Florinda's insurance needs that for coverage.     Florinda is agreeable to the plan and is appreciative of the call back.     She is expecting a call from medical oncology for an appointment.     Kalie Torrez, RN, BSN, PHN  Breast Care Nurse Coordinator  [facility] [place] [facility] [place]

## 2025-03-05 NOTE — PROGRESS NOTES
New Patient Oncology Nurse Navigator Note     Referring provider: Racquel Tariq MD      Referring Clinic/Organization: [facility] [place]      Referred to (specialty:) Medical Oncology     Requested provider (if applicable): Racquel Tariq MD     Date Referral Received: March 5, 2025     Evaluation for:  C50.412, Z17.0 (ICD-10-CM) - Malignant neoplasm of upper-outer quadrant of left breast in female, estrogen receptor positive (H)      Clinical History (per Nurse review of records provided):      Imaging:    Bilateral screening mammogram performed on 1/22/2025 performed after reported trauma to her left breast 1 month prior.  Revealed type C breast density, underlying the palpable marker in the upper outer quadrant of the left breast there is a 4.1 cm asymmetry better visualized on the cc view, overall increased density of the left breast with trabecular thickening, left breast is smaller in size compared to the right and suggestion of slight skin thickening of the left breast.    Targeted ultrasound at the the 1 o'clock position 9 cm from the nipple there is a vague irregular hypoechoic mass that is difficult to measure it size, although at least measuring 4.3 x 3.8 x 3.1 cm.  There was no suspicious lymphadenopathy in the left axilla.     Breast MRI 2/20/25 showed large area of non-mass enhancement in the left breast 12 x 6 x 11 cm in size with a 0.8 cm indeterminate medial left breast mass could be an intramammary lymph node.  1 indeterminate low left axillary lymph node showing minimal cortical thickening.       Bone scan showed no evidence of metastatic disease.     CT CAP with IV contrast showed no evidence of metastatic disease other than masslike soft tissue density in the left breast.     Pathology:     Ultrasound-guided biopsy of the left breast was performed on 1/31/2025, a clip was placed and noted to be on target.    Pathology revealed a left breast invasive lobular carcinoma,  grade 2, no evidence of angiolymphatic invasion, associated LCIS, ER 95%, MO 98%, HER2 1+, Ki-67 of 17%.     2/11/25 baseline CA 15-3 tumor marker elevated to 1030.     MammaPrint: High risk luminal B with a score of -0.38.  Adjuvant chemotherapy benefit of 6%. Probability of PCR with neoadjuvant chemotherapy 6%.     Family History  Very concerning family history with breast cancer in mother, maternal grandmother, ovarian cancer and paternal grandmother, paternal aunt with brain cancer, maternal grandfather with skin cancer and a maternal aunt with lung cancer.  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 (See notes in Care EveryWhere)    Patient seen in consultation on 2/11/25 by Dr. Nicole at [facility] for surgical consultation. (See notes in Media tab bookmarked 2/18)    Patient seen in consultation on 2/25/25 by Dr. Shaikh for surgical consultation for newly diagnosed left breast invasive lobular carcinoma, grade 1 ER/MO positive HER2 negative at 1:00, 9 cm from nipple measuring approximately 4 cm in size.     She had diagnostic breast imaging due to a palpable mass in her left breast.  Ultrasound of the left breast revealed at 1:00, 9 cm from nipple a vague irregular hypoechoic mass that is difficult to measure but measures at least 4.3 cm in size.  The overlying skin is slightly thickened with contracture present concerning for a potential inflammatory process.  Left axillary ultrasound was negative.  She subsequently had a breast MRI for which results were available this morning.  Her MRI revealed abnormal non-mass enhancement measuring up to 12 cm occupying most of the anterior mid and lateral left breast.  No evidence of chest wall invasion.  No evidence of dermal enhancement.  Posterior medial breast 3:00 is an enhancing mass which is likely an intramammary node.  No concerns on  the right breast.  Right axillary nodes were normal.  Internal mammary nodes are normal.  There was minimal cortical thickening of a low left axillary lymph node and ultrasound of this done previously had been benign. She has met with Dr.Uzma Nicole with [facility] and reports a MammaPrint is pending which I think is very helpful to guide our next steps.  If she is MammaPrint high risk, luminal B I would recommend consideration of neoadjuvant chemotherapy as this could help improve margins given the large tumor and decrease the number of nodes needed to be removed at the time of eventual surgery.  If she is MammaPrint low risk I would recommend surgery upfront as I would not expect a significant change with chemotherapy and her tumor.  We will keep an eye out for her MammaPrint results.     Patient seen in follow up by Dr. Nicole with [facility]. Plan per Dr. Nicole:    -Discussed difference between AC->T and TC x 4 cycles   -Reviewed potential for cardiotoxicity and higher risk for permanent neuropathy with AC->T  -Reviewed preference for TC if no lymph node involvement given less toxicity  -No head to head comparison available   -Discussed with Dr. Shaikh and she will arrange for U/S guided lymph node biopsy   -Port placement is scheduled with her for next Wednesday  -Echo is pending   -Will reach out to a colleague to discuss further as we do not want to under or over treat her breast cancer, particularly given young age   -Plan to repeat imaging half way through neoadjuvant therapy  -Mass is obvious and large on exam and replacing most of the breast tissue and will be easy to monitor response clinically as well   -She was given information on the chemotherapy drugs   -Patient is 100% certain she does not want to have any more children, bilateral oophorectomy should be a consideration at some point as we will most likely recommend ovarian suppression after surgery. Also we will not use GnRH agonist  (disputable benefit regardless).   -Reviewed the mammaprint data, and also we used predictbreast and reviewed 80% chance of being alive at 10 years per calculator         Records Location: Care Everywhere, Media, and See Bookmarked material     Records Needed:     Path reports-biopsy and surgery (as applicable)  Pathology reviews (as applicable)  Breast imaging for past 5 years  Systemic imaging (as applicable)  Med onc notes, rad notes, OP note, surgeons note (as applicable)  Genetic results (as applicable)  Echo or MUGA results (as applicable)  Radiation summary (as applicable)  Chemotherapy summary(as applicable)       Up coming appointments:     PORT placement on 3/12/25 with Dr. Shaikh.   Case to be discussed at Tumor Conference on 3/12/25.     Payor: [employer] / Plan: [employer] / Product Type: HMO /     March 5, 2025  Referral received and reviewed.   Message sent to Dr. Tariq for review and recommendation for scheduling.     March 6, 2025  Message received back from Dr. Tariq. Okay to add patient on 3/19 at 8:20 AM. Triage notes updated and sent to NPS to process.     Betsy Hernandez BSN, RN, OCN  Oncology Nurse Navigator   [facility]  Cancer Care Service Line   New Patient Hem/Onc Scheduling / Referrals: [phone] (fax: [phone] )

## 2025-03-06 ENCOUNTER — HOSPITAL ENCOUNTER (OUTPATIENT)
Dept: CARDIOLOGY | Facility: CLINIC | Age: 45
Discharge: HOME | End: 2025-03-06
Attending: INTERNAL MEDICINE
Payer: COMMERCIAL

## 2025-03-06 DIAGNOSIS — C50.919 BREAST CANCER, FEMALE (H): ICD-10-CM

## 2025-03-06 LAB
BI-PLANE LVEF ECHO: NORMAL
LVEF ECHO: NORMAL

## 2025-03-06 PROCEDURE — 93325 DOPPLER ECHO COLOR FLOW MAPG: CPT

## 2025-03-06 NOTE — TELEPHONE ENCOUNTER
Florinda left me a voicemail message with an update.     She is scheduled for a lymph node biopsy at [place] on 3/11/25 and then she is meeting with Dr. Tariq on 3/19/25.     She wants to make sure we have everything for tumor conference next week.     She also wanted to relay her big questions she wants addressed at tumor conference.     1) Can she do surgery first? She would really prefer to do this.     2) Is chemo a choice or is it necessary for this cancer?    I will route her questions to Dr. Shaikh to discuss at tumor conference.     Kalie Torrez, RN, BSN, PHN  Breast Care Nurse Coordinator  [facility] [place]

## 2025-03-07 NOTE — PROGRESS NOTES
[facility]    Hematology/Oncology New Patient Note      Today's Date: 03/10/25    Reason for visit: Left breast cancer.    HISTORY OF PRESENT ILLNESS: Florinda Keller is a 44 year old female who presents with the following oncologic history:  1.  1/22/2025: Mammogram at [facility] obtained for increased firmness and swelling of left breast over course of 1 month.  Imaging showed at least a 4.1 cm asymmetry in the left upper outer breast and slight thickening of the left breast.  Ultrasound of left breast showed vague, irregular hypoechoic mass at 1:00, 9 cm from nipple measuring at least 4.3 x 3.8 x 3.1 cm.  No suspicious lymph nodes within left axilla.  2.  1/31/2025: Ultrasound-guided left breast core biopsy showed grade 2 invasive lobular carcinoma, angiolymphatic invasion absent, associated LCIS present, ER moderate positive at 95%, [place] strongly positive at 98%, HER2 negative with IHC = 1+, Ki-67 = 17%.  MammaPrint high risk-1, luminal B, 6% absolute chemo benefit.  3.  2/20/2025: Breast MRI at [facility] showed abnormal non-mass type enhancement measuring 12 x 6 x 11 cm occupying most of anterior mid and lateral left breast with no evidence for chest wall invasion and no dermal enhancement.  Posterior medial breast at 3:00 is a 0.8 cm enhancing round mass which could be an intramammary lymph node.  Mild skin thickening of medial left breast.  Minimal cortical thickening of low left axillary lymph node.  Right breast with no suspicious enhancement and right axillary lymph nodes normal.  4. 2/25/2025: CT C/A/P and NM bone scan: reportedly negative for metastatic disease. 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    Florinda initially noticed bruising across her upper left breast in mid 12/2024.  She noticed the left breast contraction in size and more left compared to the right breast.  She feels some intermittent burning sensation in her left breast.    Family history significant for mother with breast  cancer at age 72.  Paternal grandmother with breast cancer age 50's and ovarian cancer at age 85. Maternal aunt with lung cancer.        REVIEW OF SYSTEMS:   14 point ROS was reviewed and is negative other than as noted above in HPI.       HOME MEDICATIONS:  Current Outpatient Medications   Medication Sig Dispense Refill    levothyroxine (SYNTHROID/LEVOTHROID) 88 MCG tablet Take 88 mcg by mouth daily           ALLERGIES:  Allergies   Allergen Reactions    Animal Dander Cough, Difficulty breathing and Itching    Cat Dander Cough, Difficulty breathing and Itching    Latex     Morphine     Other (Do Not Use)      *Darvocet A500         PAST MEDICAL HISTORY:  Past Medical History:   Diagnosis Date    Breast mass, left     Hx of anxiety     Hx of ovarian cyst     Hx of panic disorder     Hypothyroidism     Invasive ductal carcinoma of breast (H)     Neurological disorder      Gynecologic history:  Age of menarche at 13, , age of first pregnancy at 41, breast-fed her child.  Used OCPs for 26 years.  No HRT.    PAST SURGICAL HISTORY:  Past Surgical History:   Procedure Laterality Date    SKIN BIOPSY      THYROIDECTOMY, PARTIAL      WISDOM TOOTH EXTRACTION           SOCIAL HISTORY:  Social History     Socioeconomic History    Marital status:      Spouse name: Not on file    Number of children: Not on file    Years of education: Not on file    Highest education level: Not on file   Occupational History    Not on file   Tobacco Use    Smoking status: Never    Smokeless tobacco: Never   Substance and Sexual Activity    Alcohol use: Not on file    Drug use: Never    Sexual activity: Yes     Partners: Male     Birth control/protection: None   Other Topics Concern    Not on file   Social History Narrative    Not on file     Social Drivers of Health     Financial Resource Strain: Not on file   Food Insecurity: Not on file   Transportation Needs: Not on file   Physical Activity: Not on file   Stress: Not on file   Social  "Connections: Not on file   Interpersonal Safety: Not on file   Housing Stability: Not on file   Has 3 year-old child with  Mahogany Neely works for [employer] as a .    FAMILY HISTORY:  Family history significant for mother with breast cancer at age 72.  Paternal grandmother with breast cancer age 50's and ovarian cancer at age 85. Maternal aunt with lung cancer.    PHYSICAL EXAM:  Vital signs:  /79   Pulse 77   Resp 16   Ht 1.727 m (5' 8\")   Wt 84.4 kg (186 lb)   SpO2 100%   BMI 28.28 kg/m     ECO  GENERAL/CONSTITUTIONAL: No acute distress.  EYES:  No scleral icterus.  ENT/MOUTH: Neck supple. Oropharynx grossly clear.  LYMPH: No cervical, supraclavicular, axillary adenopathy.   BREAST: Right breast negative for masses.  Left breast higher and smaller than right breast.  Palpable large and firm mass over entire breast; breast is movable from posterior chest wall.  CARDIAC: Regular rate and rhythm with no murmurs.  RESPIRATORY: Clear bilaterally.   GASTROINTESTINAL: No hepatosplenomegaly, masses, or tenderness.  No guarding.  No distention.  MUSCULOSKELETAL: Warm and well-perfused, no cyanosis, clubbing, or edema.  NEUROLOGIC: No focal motor deficits. Alert, oriented, answers questions appropriately.  INTEGUMENTARY: No rashes or jaundice.  GAIT: Steady, does not use assistive device      LABS:  CBC RESULTS:   Recent Labs   Lab Test 22  2105   WBC 10.8   RBC 4.03   HGB 11.4*   HCT 35.4   MCV 88   MCH 28.3   MCHC 32.2   RDW 13.5        PATHOLOGY:  Reviewed as per HPI.    IMAGING:  Reviewed as per HPI.    ASSESSMENT/PLAN:  Florinda Keller is a 44 year old female with the following issues:  1.  Clinical prognostic stage IIA, cT3-N0-M0, grade 2 invasive lobular carcinoma of the left upper outer breast, ER positive, OR positive, HER2 negative, MammaPrint high risk, Ki-67 = 17%  - I discussed with Florinda that she has a very large 12 cm invasive lobular carcinoma that is " high risk for breast cancer recurrence.  After discussion with Dr. Shaikh, given the very large size of this tumor, she would benefit from attempted downstaging with neoadjuvant chemotherapy.  --Although CT C/A/P and NM bone scan were negative for distant metastatic disease, her physical exam is concerning for quite extensive primary tumor of the left breast that I recommended a Cerianna PET scan if insurance will approve.  - I advised weekly paclitaxel for 12 weeks with reimaging thereafter.  Will determine whether to proceed with additional chemotherapy with dose dense Adriamycin and Cytoxan versus proceeding to surgical resection with left mastectomy.  However, given high risk Mammaprint result, she would benefit from dose dense AC.  -Further discussed the role of adjuvant radiation therapy as well as adjuvant endocrine therapy with ovarian suppression therapy with Zoladex injections every 4 weeks in conjunction with an aromatase inhibitor such as anastrozole.  --She has a left axillary node biopsy scheduled tomorrow.  --Will check CBC, CMP.  Will also check CA 15-3 as there is a question of whether this was high at 1030.    2.  Fertility discussion  -She has one child, age 5 and will not be planning any more children.    Racquel Tariq MD  [facility] Hematology/Oncology    Total time spent today: 65 minutes in chart review, patient evaluation, counseling, documentation, test and/or medication/prescription orders, and coordination of care.      The longitudinal plan of care for the diagnosis(es)/condition(s) as documented were addressed during this visit. Due to the added complexity in care, I will continue to support Florinda in the subsequent management and with ongoing continuity of care.

## 2025-03-10 ENCOUNTER — ONCOLOGY VISIT (OUTPATIENT)
Dept: ONCOLOGY | Facility: CLINIC | Age: 45
End: 2025-03-10
Attending: INTERNAL MEDICINE
Payer: COMMERCIAL

## 2025-03-10 VITALS
HEART RATE: 77 BPM | SYSTOLIC BLOOD PRESSURE: 130 MMHG | DIASTOLIC BLOOD PRESSURE: 79 MMHG | WEIGHT: 186 LBS | BODY MASS INDEX: 28.19 KG/M2 | HEIGHT: 68 IN | RESPIRATION RATE: 16 BRPM | OXYGEN SATURATION: 100 %

## 2025-03-10 DIAGNOSIS — Z17.0 MALIGNANT NEOPLASM OF OVERLAPPING SITES OF LEFT BREAST IN FEMALE, ESTROGEN RECEPTOR POSITIVE (H): Primary | ICD-10-CM

## 2025-03-10 DIAGNOSIS — C50.812 MALIGNANT NEOPLASM OF OVERLAPPING SITES OF LEFT BREAST IN FEMALE, ESTROGEN RECEPTOR POSITIVE (H): Primary | ICD-10-CM

## 2025-03-10 LAB
ALBUMIN SERPL BCG-MCNC: 4.4 G/DL (ref 3.5–5.2)
ALP SERPL-CCNC: 60 U/L (ref 40–150)
ALT SERPL W P-5'-P-CCNC: 36 U/L (ref 0–50)
ANION GAP SERPL CALCULATED.3IONS-SCNC: 13 MMOL/L (ref 7–15)
AST SERPL W P-5'-P-CCNC: 45 U/L (ref 0–45)
BASOPHILS # BLD AUTO: 0.1 10E3/UL (ref 0–0.2)
BASOPHILS NFR BLD AUTO: 1 %
BILIRUB SERPL-MCNC: 0.9 MG/DL
BUN SERPL-MCNC: 6.5 MG/DL (ref 6–20)
CALCIUM SERPL-MCNC: 9.2 MG/DL (ref 8.8–10.4)
CANCER AG15-3 SERPL-ACNC: 990 U/ML
CHLORIDE SERPL-SCNC: 101 MMOL/L (ref 98–107)
CREAT SERPL-MCNC: 0.65 MG/DL (ref 0.51–0.95)
EGFRCR SERPLBLD CKD-EPI 2021: >90 ML/MIN/1.73M2
EOSINOPHIL # BLD AUTO: 0.2 10E3/UL (ref 0–0.7)
EOSINOPHIL NFR BLD AUTO: 3 %
ERYTHROCYTE [DISTWIDTH] IN BLOOD BY AUTOMATED COUNT: 13.1 % (ref 10–15)
GLUCOSE SERPL-MCNC: 93 MG/DL (ref 70–99)
HCO3 SERPL-SCNC: 23 MMOL/L (ref 22–29)
HCT VFR BLD AUTO: 40 % (ref 35–47)
HGB BLD-MCNC: 13.4 G/DL (ref 11.7–15.7)
IMM GRANULOCYTES # BLD: 0 10E3/UL
IMM GRANULOCYTES NFR BLD: 0 %
LYMPHOCYTES # BLD AUTO: 2.3 10E3/UL (ref 0.8–5.3)
LYMPHOCYTES NFR BLD AUTO: 30 %
MCH RBC QN AUTO: 30.9 PG (ref 26.5–33)
MCHC RBC AUTO-ENTMCNC: 33.5 G/DL (ref 31.5–36.5)
MCV RBC AUTO: 92 FL (ref 78–100)
MONOCYTES # BLD AUTO: 0.4 10E3/UL (ref 0–1.3)
MONOCYTES NFR BLD AUTO: 5 %
NEUTROPHILS # BLD AUTO: 4.7 10E3/UL (ref 1.6–8.3)
NEUTROPHILS NFR BLD AUTO: 61 %
NRBC # BLD AUTO: 0 10E3/UL
NRBC BLD AUTO-RTO: 0 /100
PLATELET # BLD AUTO: 300 10E3/UL (ref 150–450)
POTASSIUM SERPL-SCNC: 4.3 MMOL/L (ref 3.4–5.3)
PROT SERPL-MCNC: 7.3 G/DL (ref 6.4–8.3)
RBC # BLD AUTO: 4.34 10E6/UL (ref 3.8–5.2)
SODIUM SERPL-SCNC: 137 MMOL/L (ref 135–145)
WBC # BLD AUTO: 7.8 10E3/UL (ref 4–11)

## 2025-03-10 PROCEDURE — 99213 OFFICE O/P EST LOW 20 MIN: CPT | Performed by: INTERNAL MEDICINE

## 2025-03-10 PROCEDURE — 86300 IMMUNOASSAY TUMOR CA 15-3: CPT | Performed by: INTERNAL MEDICINE

## 2025-03-10 PROCEDURE — 85004 AUTOMATED DIFF WBC COUNT: CPT | Performed by: INTERNAL MEDICINE

## 2025-03-10 PROCEDURE — 99205 OFFICE O/P NEW HI 60 MIN: CPT | Performed by: INTERNAL MEDICINE

## 2025-03-10 PROCEDURE — 80053 COMPREHEN METABOLIC PANEL: CPT | Performed by: INTERNAL MEDICINE

## 2025-03-10 PROCEDURE — G2211 COMPLEX E/M VISIT ADD ON: HCPCS | Performed by: INTERNAL MEDICINE

## 2025-03-10 PROCEDURE — 36415 COLL VENOUS BLD VENIPUNCTURE: CPT | Performed by: INTERNAL MEDICINE

## 2025-03-10 ASSESSMENT — PAIN SCALES - GENERAL: PAINLEVEL_OUTOF10: NO PAIN (0)

## 2025-03-10 NOTE — PATIENT INSTRUCTIONS
1. Arrange for stat Cerianna PET scan.  2. Arrange for lab draw and Taxol weekly x 12 weeks.  3. RTC MD alternating with NP visit every 3 weeks.  4. Arrange for breast MRI after 12th cycle of Taxol with MD visit after MRI.

## 2025-03-10 NOTE — LETTER
3/10/2025      Florinda Keller  [address]  [place] [address]      Dear Colleague,    Thank you for referring your patient, Florinda Keller, to the [facility]. Please see a copy of my visit note below.    [facility]    Hematology/Oncology New Patient Note      Today's Date: 03/10/25    Reason for visit: Left breast cancer.    HISTORY OF PRESENT ILLNESS: Florinda Keller is a 44 year old female who presents with the following oncologic history:  1.  1/22/2025: Mammogram at [facility] obtained for increased firmness and swelling of left breast over course of 1 month.  Imaging showed at least a 4.1 cm asymmetry in the left upper outer breast and slight thickening of the left breast.  Ultrasound of left breast showed vague, irregular hypoechoic mass at 1:00, 9 cm from nipple measuring at least 4.3 x 3.8 x 3.1 cm.  No suspicious lymph nodes within left axilla.  2.  1/31/2025: Ultrasound-guided left breast core biopsy showed grade 2 invasive lobular carcinoma, angiolymphatic invasion absent, associated LCIS present, ER moderate positive at 95%, CT strongly positive at 98%, HER2 negative with IHC = 1+, Ki-67 = 17%.  MammaPrint high risk-1, luminal B, 6% absolute chemo benefit.  3.  2/20/2025: Breast MRI at [facility] showed abnormal non-mass type enhancement measuring 12 x 6 x 11 cm occupying most of anterior mid and lateral left breast with no evidence for chest wall invasion and no dermal enhancement.  Posterior medial breast at 3:00 is a 0.8 cm enhancing round mass which could be an intramammary lymph node.  Mild skin thickening of medial left breast.  Minimal cortical thickening of low left axillary lymph node.  Right breast with no suspicious enhancement and right axillary lymph nodes normal.  4. 2/25/2025: CT C/A/P and NM bone scan: reportedly negative for metastatic disease. 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    Florinda initially noticed bruising across her  upper left breast in mid 2024.  She noticed the left breast contraction in size and more left compared to the right breast.  She feels some intermittent burning sensation in her left breast.    Family history significant for mother with breast cancer at age 72.  Paternal grandmother with breast cancer age 50's and ovarian cancer at age 85. Maternal aunt with lung cancer.        REVIEW OF SYSTEMS:   14 point ROS was reviewed and is negative other than as noted above in HPI.       HOME MEDICATIONS:  Current Outpatient Medications   Medication Sig Dispense Refill     levothyroxine (SYNTHROID/LEVOTHROID) 88 MCG tablet Take 88 mcg by mouth daily           ALLERGIES:  Allergies   Allergen Reactions     Animal Dander Cough, Difficulty breathing and Itching     Cat Dander Cough, Difficulty breathing and Itching     Latex      Morphine      Other (Do Not Use)      *Darvocet A500         PAST MEDICAL HISTORY:  Past Medical History:   Diagnosis Date     Breast mass, left      Hx of anxiety      Hx of ovarian cyst      Hx of panic disorder      Hypothyroidism      Invasive ductal carcinoma of breast (H)      Neurological disorder      Gynecologic history:  Age of menarche at 13, , age of first pregnancy at 41, breast-fed her child.  Used OCPs for 26 years.  No HRT.    PAST SURGICAL HISTORY:  Past Surgical History:   Procedure Laterality Date     SKIN BIOPSY       THYROIDECTOMY, PARTIAL       WISDOM TOOTH EXTRACTION           SOCIAL HISTORY:  Social History     Socioeconomic History     Marital status:      Spouse name: Not on file     Number of children: Not on file     Years of education: Not on file     Highest education level: Not on file   Occupational History     Not on file   Tobacco Use     Smoking status: Never     Smokeless tobacco: Never   Substance and Sexual Activity     Alcohol use: Not on file     Drug use: Never     Sexual activity: Yes     Partners: Male     Birth control/protection: None   Other  "Topics Concern     Not on file   Social History Narrative     Not on file     Social Drivers of Health     Financial Resource Strain: Not on file   Food Insecurity: Not on file   Transportation Needs: Not on file   Physical Activity: Not on file   Stress: Not on file   Social Connections: Not on file   Interpersonal Safety: Not on file   Housing Stability: Not on file   Has 3 year-old child with  Mahogany Neely works for [employer] as a .    FAMILY HISTORY:  Family history significant for mother with breast cancer at age 72.  Paternal grandmother with breast cancer age 50's and ovarian cancer at age 85. Maternal aunt with lung cancer.    PHYSICAL EXAM:  Vital signs:  /79   Pulse 77   Resp 16   Ht 1.727 m (5' 8\")   Wt 84.4 kg (186 lb)   SpO2 100%   BMI 28.28 kg/m     ECO  GENERAL/CONSTITUTIONAL: No acute distress.  EYES:  No scleral icterus.  ENT/MOUTH: Neck supple. Oropharynx grossly clear.  LYMPH: No cervical, supraclavicular, axillary adenopathy.   BREAST: Right breast negative for masses.  Left breast higher and smaller than right breast.  Palpable large and firm mass over entire breast; breast is movable from posterior chest wall.  CARDIAC: Regular rate and rhythm with no murmurs.  RESPIRATORY: Clear bilaterally.   GASTROINTESTINAL: No hepatosplenomegaly, masses, or tenderness.  No guarding.  No distention.  MUSCULOSKELETAL: Warm and well-perfused, no cyanosis, clubbing, or edema.  NEUROLOGIC: No focal motor deficits. Alert, oriented, answers questions appropriately.  INTEGUMENTARY: No rashes or jaundice.  GAIT: Steady, does not use assistive device      LABS:  CBC RESULTS:   Recent Labs   Lab Test 22  2105   WBC 10.8   RBC 4.03   HGB 11.4*   HCT 35.4   MCV 88   MCH 28.3   MCHC 32.2   RDW 13.5        PATHOLOGY:  Reviewed as per HPI.    IMAGING:  Reviewed as per HPI.    ASSESSMENT/PLAN:  Florinda Keller is a 44 year old female with the following issues:  1.  " Clinical prognostic stage IIA, cT3-N0-M0, grade 2 invasive lobular carcinoma of the left upper outer breast, ER positive, IN positive, HER2 negative, MammaPrint high risk, Ki-67 = 17%  - I discussed with Florinda that she has a very large 12 cm invasive lobular carcinoma that is high risk for breast cancer recurrence.  After discussion with Dr. Shaikh, given the very large size of this tumor, she would benefit from attempted downstaging with neoadjuvant chemotherapy.  --Although CT C/A/P and NM bone scan were negative for distant metastatic disease, her physical exam is concerning for quite extensive primary tumor of the left breast that I recommended a Cerianna PET scan if insurance will approve.  - I advised weekly paclitaxel for 12 weeks with reimaging thereafter.  Will determine whether to proceed with additional chemotherapy with dose dense Adriamycin and Cytoxan versus proceeding to surgical resection with left mastectomy.  However, given high risk Mammaprint result, she would benefit from dose dense AC.  -Further discussed the role of adjuvant radiation therapy as well as adjuvant endocrine therapy with ovarian suppression therapy with Zoladex injections every 4 weeks in conjunction with an aromatase inhibitor such as anastrozole.  --She has a left axillary node biopsy scheduled tomorrow.  --Will check CBC, CMP.  Will also check CA 15-3 as there is a question of whether this was high at 1030.    2.  Fertility discussion  -She has one child, age 5 and will not be planning any more children.    Racquel Tariq MD  [facility] Hematology/Oncology    Total time spent today: 65 minutes in chart review, patient evaluation, counseling, documentation, test and/or medication/prescription orders, and coordination of care.      The longitudinal plan of care for the diagnosis(es)/condition(s) as documented were addressed during this visit. Due to the added complexity in care, I will continue to support Florinda in the  subsequent management and with ongoing continuity of care.      Again, thank you for allowing me to participate in the care of your patient.        Sincerely,        Racquel Tariq MD    Electronically signed

## 2025-03-11 ENCOUNTER — ANCILLARY PROCEDURE (OUTPATIENT)
Dept: MAMMOGRAPHY | Facility: CLINIC | Age: 45
End: 2025-03-11
Attending: SURGERY
Payer: COMMERCIAL

## 2025-03-11 ENCOUNTER — MYC MEDICAL ADVICE (OUTPATIENT)
Dept: ONCOLOGY | Facility: CLINIC | Age: 45
End: 2025-03-11
Payer: COMMERCIAL

## 2025-03-11 ENCOUNTER — HOSPITAL ENCOUNTER (OUTPATIENT)
Facility: CLINIC | Age: 45
End: 2025-03-11
Payer: COMMERCIAL

## 2025-03-11 ENCOUNTER — PREP FOR PROCEDURE (OUTPATIENT)
Dept: SURGERY | Facility: CLINIC | Age: 45
End: 2025-03-11
Payer: COMMERCIAL

## 2025-03-11 ENCOUNTER — TELEPHONE (OUTPATIENT)
Dept: ONCOLOGY | Facility: CLINIC | Age: 45
End: 2025-03-11
Payer: COMMERCIAL

## 2025-03-11 ENCOUNTER — PATIENT OUTREACH (OUTPATIENT)
Dept: ONCOLOGY | Facility: CLINIC | Age: 45
End: 2025-03-11
Payer: COMMERCIAL

## 2025-03-11 DIAGNOSIS — Z17.0 MALIGNANT NEOPLASM OF UPPER-OUTER QUADRANT OF LEFT BREAST IN FEMALE, ESTROGEN RECEPTOR POSITIVE (H): ICD-10-CM

## 2025-03-11 DIAGNOSIS — C50.412 MALIGNANT NEOPLASM OF UPPER-OUTER QUADRANT OF LEFT BREAST IN FEMALE, ESTROGEN RECEPTOR POSITIVE (H): ICD-10-CM

## 2025-03-11 DIAGNOSIS — C50.812 MALIGNANT NEOPLASM OF OVERLAPPING SITES OF LEFT BREAST IN FEMALE, ESTROGEN RECEPTOR POSITIVE (H): Primary | ICD-10-CM

## 2025-03-11 DIAGNOSIS — Z17.0 MALIGNANT NEOPLASM OF OVERLAPPING SITES OF LEFT BREAST IN FEMALE, ESTROGEN RECEPTOR POSITIVE (H): Primary | ICD-10-CM

## 2025-03-11 PROCEDURE — 76882 US LMTD JT/FCL EVL NVASC XTR: CPT | Mod: LT

## 2025-03-11 PROCEDURE — 250N000009 HC RX 250: Performed by: SURGERY

## 2025-03-11 PROCEDURE — 999N000065 MA POST PROCEDURE LEFT

## 2025-03-11 PROCEDURE — 76942 ECHO GUIDE FOR BIOPSY: CPT

## 2025-03-11 PROCEDURE — 38505 NEEDLE BIOPSY LYMPH NODES: CPT | Mod: LT

## 2025-03-11 PROCEDURE — 272N000717 US BREAST BIOPSY CORE LYMPH NODE LEFT

## 2025-03-11 RX ORDER — LIDOCAINE AND PRILOCAINE 25; 25 MG/G; MG/G
CREAM TOPICAL DAILY PRN
Qty: 30 G | Refills: 3 | Status: SHIPPED | OUTPATIENT
Start: 2025-03-11

## 2025-03-11 RX ORDER — PROCHLORPERAZINE MALEATE 10 MG
10 TABLET ORAL EVERY 6 HOURS PRN
Qty: 30 TABLET | Refills: 2 | Status: SHIPPED | OUTPATIENT
Start: 2025-03-16

## 2025-03-11 RX ADMIN — LIDOCAINE HYDROCHLORIDE 10 ML: 10 INJECTION, SOLUTION INFILTRATION; PERINEURAL at 14:18

## 2025-03-11 NOTE — CONFIDENTIAL NOTE
Pt calling to request EMLA cream for port access to be prescribed to her pharmacy, [facility] [address] in [place].    Will send to provider for Rx.

## 2025-03-11 NOTE — DISCHARGE INSTRUCTIONS
After Your Breast Biopsy    Bleeding, bruising, and pain  Breast tenderness and some bruising is normal and may last several days. You may wear your bra overnight to support the breast.  You may use an ice pack for pain. Place it over the area for 15 to 20 minutes, several times a day.  You may take over-the-counter pain medicine:  On the day of the biopsy, we recommend Tylenol (acetaminophen) because it does not raise your risk of bleeding.  The next day, you may take an anti-inflammatory medicine (aspirin, ibuprofen, Motrin, Aleve, Advil), unless your doctor tells you not to.  Bandages and showering  Keep your bandage in place until tomorrow morning. Don't get it wet.  If you have small pieces of tape on the skin, leave them in place. They will fall off on their own, or you can remove them after 5 days.  You may shower the next morning after your biopsy.  Activity  No heavy activity (no running, no gym workouts, no lifting, no vacuuming, etc.) on the day of your biopsy.  You may go back to normal activity the next day. But limit what you do if you still have pain or discomfort.  Infection  Infection is rare. Signs of infection include:  Fever (including sweats and chills)  Redness  Pain that gets worse  Fluid draining from the biopsy site  Biopsy results  Results may take up to 5 business days.  A nurse or doctor from the Breast Center will call with your results. The system sends the results to the doctor that ordered your biopsy & MyChart automatically.     If you have not gotten your results in 5 days, please call the Breast Center.  Call the Breast Center with questions or if:   You have bleeding that lasts more than 20 minutes.  You have pain that you can't control.  You have signs of infection (fever, sweats, chills, redness, increasing pain, or drainage).  After hours, please call the doctor who ordered your biopsy.     Breast Center Nurse  [phone]    For informational purposes only. Not to replace the  advice of your health care provider. Copyright   2010 [employer] [facility]. All rights reserved. Clinically reviewed by Gemini Fitzpatrick, Director, [facility]. [place] [id number] - REV 08/23.

## 2025-03-12 ENCOUNTER — HOSPITAL ENCOUNTER (OUTPATIENT)
Dept: GENERAL RADIOLOGY | Facility: CLINIC | Age: 45
Discharge: HOME | End: 2025-03-12
Attending: SURGERY
Payer: COMMERCIAL

## 2025-03-12 ENCOUNTER — TUMOR CONFERENCE (OUTPATIENT)
Dept: ONCOLOGY | Facility: CLINIC | Age: 45
End: 2025-03-12
Payer: COMMERCIAL

## 2025-03-12 ENCOUNTER — TELEPHONE (OUTPATIENT)
Dept: MAMMOGRAPHY | Facility: CLINIC | Age: 45
End: 2025-03-12

## 2025-03-12 DIAGNOSIS — Z17.0 MALIGNANT NEOPLASM OF OVERLAPPING SITES OF LEFT BREAST IN FEMALE, ESTROGEN RECEPTOR POSITIVE (H): ICD-10-CM

## 2025-03-12 DIAGNOSIS — R92.8 ABNORMAL MRI, BREAST: ICD-10-CM

## 2025-03-12 DIAGNOSIS — Z17.0 MALIGNANT NEOPLASM OF OVERLAPPING SITES OF LEFT BREAST IN FEMALE, ESTROGEN RECEPTOR POSITIVE (H): Primary | ICD-10-CM

## 2025-03-12 DIAGNOSIS — R92.8 ABNORMAL MRI, BREAST: Primary | ICD-10-CM

## 2025-03-12 DIAGNOSIS — C50.812 MALIGNANT NEOPLASM OF OVERLAPPING SITES OF LEFT BREAST IN FEMALE, ESTROGEN RECEPTOR POSITIVE (H): Primary | ICD-10-CM

## 2025-03-12 DIAGNOSIS — C50.812 MALIGNANT NEOPLASM OF OVERLAPPING SITES OF LEFT BREAST IN FEMALE, ESTROGEN RECEPTOR POSITIVE (H): ICD-10-CM

## 2025-03-12 PROCEDURE — 999N000122 MR OUTSIDE READ

## 2025-03-12 RX ORDER — DIAZEPAM 5 MG/1
5 TABLET ORAL EVERY 6 HOURS PRN
Qty: 2 TABLET | Refills: 0 | Status: SHIPPED | OUTPATIENT
Start: 2025-03-12

## 2025-03-12 NOTE — TUMOR CONFERENCE
Tumor Conference Information  Tumor Conference: Breast  Specialties Present: Medical oncology, Radiation oncology, Pathology, Radiology, Surgery  Patient Status: Prospective  Stage: -  Treatment to Date: Biopsy  Clinical Trials: Not discussed  Genetic Testing Discussed/Recommended?: Yes  Supportive Care Services Discussed/Recommended?: No  Recommended Plan: Follows evidence-based guidelines  Did the review exceed 30 minutes?: did not       Reviewed all imaging and pathology. Recommendation for [facility] MRI review/second opinion. MRI guided right breast biopsy recommended, as well as additional imaging with Cerianna PET, and discussed treatment recommendations.    Documentation / Disclaimer Cancer Tumor Board Note  Cancer tumor board recommendations do not override what is determined to be reasonable care and treatment, which is dependent on the circumstances of a patient's case; the patient's medical, social, and personal concerns; and the clinical judgment of the oncologist [physician].

## 2025-03-12 NOTE — TELEPHONE ENCOUNTER
Breast Care Coordination:    Left Patient a voicemail message, introducing self and role, and let her know I was calling to discuss breast cancer care planning recommendations discussed today in our Multidisciplinary Breast Conference. Awaiting return call.    Aisha Dorman, RN, BSN, PHN, CBCN  Breast Nurse Coordinator  [facility]  [phone]

## 2025-03-12 NOTE — TELEPHONE ENCOUNTER
Breast Care Coordination / Follow-up:    Spoke with Florinda regarding update after case was presented at our multidisciplinary breast tumor board. Discussed recommendation for outside review of Breast MRI (result reviewed) and Right breast MRI guided biopsy due to enhancing mass in the right breast Discussed MRI guided biopsy process. She requested oral sedation medication (Xanax) for this, due to claustrophobia. Rx to be sent to [facility] [place]. Will request from Dr. Shaikh. Reviewed she will need a  the day of procedure.    Reviewed plan of care as follows: Plan for neoadjuvant chemotherapy as discussed with Dr. Tariq. Prior to chemotherapy, patient is scheduled for the following:     -3/18 Right breast MRI guided biopsy  -3/21 Cerianna PET  -3/26 Follow up with Dr. Tariq (discuss results and plan)  -3/28 PORT  -3/31 Labs / Chemo     She asked about her repeated Cancer Antigen 15-3. Let her know that this result is abnormal / high, which is a concern, and reiterated that the Cerianna PET is a thorough scan that direct treatment moving forward. She asked if there was a possibility the cancer staging could change after PET. Provided education regarding breast cancer development and spread, she understood this, and that PET will assist with staging by identify if there is any metastatic disease. She understood this.    Reassured her that she is all set up for her next steps, and has done all she needs to do for scheduling and planning for now. Recommended she do something she enjoys - to help distract her from the process of waiting, though understandably hard to do. She agrees with this plan and appreciates the follow-up. All questions were addressed at this time. She has my number and will call if she has any additional question or needs.     -Left Axillary Bx - pending    Aisha Dorman, RN, BSN, PHN, CBCN  Breast Nurse Coordinator  [facility]  [phone]

## 2025-03-13 NOTE — CONFIDENTIAL NOTE
Orders placed for valium to take prior to MRI for anxiety/claustrophobia.     Nancy Shaikh MD  [facility]  [phone]

## 2025-03-17 ENCOUNTER — TELEPHONE (OUTPATIENT)
Dept: MAMMOGRAPHY | Facility: CLINIC | Age: 45
End: 2025-03-17
Payer: COMMERCIAL

## 2025-03-17 LAB
PATH REPORT.COMMENTS IMP SPEC: ABNORMAL
PATH REPORT.COMMENTS IMP SPEC: YES
PATH REPORT.FINAL DX SPEC: ABNORMAL
PATH REPORT.GROSS SPEC: ABNORMAL
PATH REPORT.MICROSCOPIC SPEC OTHER STN: ABNORMAL
PATH REPORT.MICROSCOPIC SPEC OTHER STN: ABNORMAL
PATH REPORT.RELEVANT HX SPEC: ABNORMAL
PHOTO IMAGE: ABNORMAL

## 2025-03-17 NOTE — PROGRESS NOTES
[facility] Cancer Care    Hematology/Oncology Established Patient Note      Today's Date: 3/26/2025    Reason for visit: Left breast cancer.    HISTORY OF PRESENT ILLNESS: Florinda Keller is a 44 year old female who presents with the following oncologic history:  1.  1/22/2025: Mammogram at [facility] obtained for increased firmness and swelling of left breast over course of 1 month.  Imaging showed at least a 4.1 cm asymmetry in the left upper outer breast and slight thickening of the left breast.  Ultrasound of left breast showed vague, irregular hypoechoic mass at 1:00, 9 cm from nipple measuring at least 4.3 x 3.8 x 3.1 cm.  No suspicious lymph nodes within left axilla.  2.  1/31/2025: Ultrasound-guided left breast core biopsy showed grade 2 invasive lobular carcinoma, angiolymphatic invasion absent, associated LCIS present, ER moderate positive at 95%, MN strongly positive at 98%, HER2 negative with IHC = 1+, Ki-67 = 17%.  MammaPrint high risk-1, luminal B, 6% absolute chemo benefit.  3.  2/20/2025: Breast MRI at [facility] showed abnormal non-mass type enhancement measuring 12 x 6 x 11 cm occupying most of anterior mid and lateral LEFT breast with tethering of underlying pectoralis major muscle but no suspicious enhancement within the  muscle itself; additional mass at 3:00 posteriorly on the left appears to represent an intramammary lymph node; mildly enlarged lymph node in the low LEFT axilla was biopsied on 3/11/2025; Sscattered foci of enhancement within the RIGHT breast with the most  discrete of these is a mass measuring up to 1.0 cm in maximal dimension in the central core, anterior to middle depth.  4. 2/25/2025: CT C/A/P and NM bone scan: reportedly negative for metastatic disease. 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  5. 3/11/2025: Left axillary lymph node biopsy positive for metastatic lobular carcinoma of breast, ER positive at 90%, MN positive at 90%, HER-2 negative with IHC = 1+.  6.  3/18/2025: Right breast biopsy at 8:00 negative for malignancy.  7. 3/21/2025: Cerianna PET scan showed Left breast mass 9.6 cm with mild PET Cerianna uptake consistent with known ER+ positive breast cancer; 12 mm left axillary lymph node with mild PET Cerianna uptake consistent with metastasis. Several additional smaller left axillary and subpectoral lymph nodes do not take up PET Cerianna and are indeterminate.    INTERVAL HISTORY:  Florinda reports no new complaints.    REVIEW OF SYSTEMS:   14 point ROS was reviewed and is negative other than as noted above in HPI.       HOME MEDICATIONS:  Current Outpatient Medications   Medication Sig Dispense Refill    diazepam (VALIUM) 5 MG tablet Take 1 tablet (5 mg) by mouth every 6 hours as needed for anxiety (take prior to mR biopsy). 2 tablet 0    levothyroxine (SYNTHROID/LEVOTHROID) 88 MCG tablet Take 88 mcg by mouth daily      lidocaine-prilocaine (EMLA) 2.5-2.5 % external cream Apply topically daily as needed for moderate pain. 30 g 3    prochlorperazine (COMPAZINE) 10 MG tablet Take 1 tablet (10 mg) by mouth every 6 hours as needed for nausea or vomiting. 30 tablet 2         ALLERGIES:  Allergies   Allergen Reactions    Animal Dander Cough, Difficulty breathing and Itching    Cat Dander Cough, Difficulty breathing and Itching    Latex     Morphine     Other (Do Not Use)      *Darvocet A500         PAST MEDICAL HISTORY:  Past Medical History:   Diagnosis Date    Breast mass, left     Hx of anxiety     Hx of ovarian cyst     Hx of panic disorder     Hypothyroidism     Invasive ductal carcinoma of breast (H)     Neurological disorder      Gynecologic history:  Age of menarche at 13, , age of first pregnancy at 41, breast-fed her child.  Used OCPs for 26 years.  No HRT.    PAST SURGICAL HISTORY:  Past Surgical History:   Procedure Laterality Date    SKIN BIOPSY      THYROIDECTOMY, PARTIAL      WISDOM TOOTH EXTRACTION           SOCIAL HISTORY:  Social History      Socioeconomic History    Marital status:      Spouse name: Not on file    Number of children: Not on file    Years of education: Not on file    Highest education level: Not on file   Occupational History    Not on file   Tobacco Use    Smoking status: Never    Smokeless tobacco: Never   Substance and Sexual Activity    Alcohol use: Not on file    Drug use: Never    Sexual activity: Yes     Partners: Male     Birth control/protection: None   Other Topics Concern    Not on file   Social History Narrative    Not on file     Social Drivers of Health     Financial Resource Strain: Not on file   Food Insecurity: Not on file   Transportation Needs: Not on file   Physical Activity: Not on file   Stress: Not on file   Social Connections: Not on file   Interpersonal Safety: Not on file   Housing Stability: Not on file   Has 3 year-old child with  Mahogany Neely works for [employer] as a .    FAMILY HISTORY:  Family history significant for mother with breast cancer at age 72.  Paternal grandmother with breast cancer age 50's and ovarian cancer at age 85. Maternal aunt with lung cancer.    PHYSICAL EXAM:  Vital signs:  There were no vitals taken for this visit.   ECO  GENERAL/CONSTITUTIONAL: No acute distress.  EYES:  No scleral icterus.  ENT/MOUTH: Neck supple. Oropharynx grossly clear.  LYMPH: No cervical, supraclavicular, axillary adenopathy.   BREAST: Right breast negative for masses.  Left breast higher and smaller than right breast.  Palpable large and firm mass over entire breast; breast is movable from posterior chest wall.  CARDIAC: Regular rate and rhythm with no murmurs.  RESPIRATORY: Clear bilaterally.   GASTROINTESTINAL: No hepatosplenomegaly, masses, or tenderness.  No guarding.  No distention.  MUSCULOSKELETAL: Warm and well-perfused, no cyanosis, clubbing, or edema.  NEUROLOGIC: No focal motor deficits. Alert, oriented, answers questions appropriately.  INTEGUMENTARY: No rashes  or jaundice.  GAIT: Steady, does not use assistive device      LABS:  CBC RESULTS:   Recent Labs   Lab Test 02/04/22  2105   WBC 10.8   RBC 4.03   HGB 11.4*   HCT 35.4   MCV 88   MCH 28.3   MCHC 32.2   RDW 13.5        PATHOLOGY:  Reviewed as per HPI.    IMAGING:  Reviewed as per HPI.    ASSESSMENT/PLAN:  Florinda Keller is a 44 year old female with the following issues:  1.  Clinical prognostic stage IIIA, cT3-N1-MX, grade 2 invasive lobular carcinoma of the left upper outer breast, ER positive, IL positive, HER2 negative, MammaPrint high risk, Ki-67 = 17%  - I personally reviewed 3/21/2025 Cerianna PET scan and discussed with Florinda that she has uptake within the known large left breast invasive lobular carcinoma, left axillary node, but no other site and no distant metastatic disease.  --Given the very large size of this tumor, she would benefit from attempted downstaging with neoadjuvant chemotherapy.  - I advised weekly paclitaxel for 12 weeks with reimaging thereafter and likely additional chemotherapy with dose dense Adriamycin and Cytoxan given high risk Mammaprint result. She would then proceed to mastectomy under care of Dr. Nancy Shaikh. I advised an interval breast MRI after completing the paclitaxel portion of her chemo.  -Further discussed the role of adjuvant radiation therapy as well as adjuvant endocrine therapy with ovarian suppression therapy with Zoladex injections every 4 weeks in conjunction with an aromatase inhibitor such as anastrozole.  --3/11/2025 Left axillary node biopsy positive for metastatic lobular carcinoma.  --3/18/2025 Right breast biopsy was negative for malignancy.  --3/10/2025 CA 15-3 was elevated at 990.  Discussed that although NCCN guidelines do not recommend following tumor markers for routine surveillance, following her CA 15-3 will be helpful in her specific situation for treatment decision making due to the concern for additional micrometastatic  disease.    2.  Fertility discussion  -She has one child, age 3 and will not be planning any more children.    Racquel Tariq MD  [facility] Hematology/Oncology    Total time spent today: 40 minutes in chart review, patient evaluation, counseling, documentation, test and/or medication/prescription orders, and coordination of care.      The longitudinal plan of care for the diagnosis(es)/condition(s) as documented were addressed during this visit. Due to the added complexity in care, I will continue to support Florinda in the subsequent management and with ongoing continuity of care.

## 2025-03-17 NOTE — TELEPHONE ENCOUNTER
Called patient to inform her of the left Axillary Lymph Node Biopsy results from 3/11/25.  Patient will be here tomorrow for right breast MRI Guided Core Needle Biopsy.  She is already scheduled with oncology follow up to discuss results after all imaging tests are complete 3/26/25.  Patient denies concerns at her biopsy site.  Denies further questions, verbalizes understanding and agrees with plan.          Rosa Hi, RN, BSN, PHN, CBCN  Breast Center Imaging Nurse Coordinator   [address]  [place] [address]  [phone]

## 2025-03-18 ENCOUNTER — HOSPITAL ENCOUNTER (OUTPATIENT)
Dept: MAMMOGRAPHY | Facility: CLINIC | Age: 45
Discharge: HOME | End: 2025-03-18
Attending: SURGERY
Payer: COMMERCIAL

## 2025-03-18 ENCOUNTER — ANCILLARY PROCEDURE (OUTPATIENT)
Dept: MAMMOGRAPHY | Facility: CLINIC | Age: 45
End: 2025-03-18
Attending: SURGERY
Payer: COMMERCIAL

## 2025-03-18 DIAGNOSIS — Z17.0 MALIGNANT NEOPLASM OF OVERLAPPING SITES OF LEFT BREAST IN FEMALE, ESTROGEN RECEPTOR POSITIVE (H): ICD-10-CM

## 2025-03-18 DIAGNOSIS — R92.8 ABNORMAL MRI, BREAST: ICD-10-CM

## 2025-03-18 DIAGNOSIS — C50.812 MALIGNANT NEOPLASM OF OVERLAPPING SITES OF LEFT BREAST IN FEMALE, ESTROGEN RECEPTOR POSITIVE (H): ICD-10-CM

## 2025-03-18 PROCEDURE — 272N000009 MR BREAST PRCTNUS CORE NDL BX 1ST LSN RT

## 2025-03-18 PROCEDURE — 255N000002 HC RX 255 OP 636: Performed by: SURGERY

## 2025-03-18 PROCEDURE — A9585 GADOBUTROL INJECTION: HCPCS | Performed by: SURGERY

## 2025-03-18 PROCEDURE — 19085 BX BREAST 1ST LESION MR IMAG: CPT | Mod: RT

## 2025-03-18 PROCEDURE — 250N000011 HC RX IP 250 OP 636: Performed by: RADIOLOGY

## 2025-03-18 PROCEDURE — 999N000065 MA POST PROCEDURE RIGHT

## 2025-03-18 PROCEDURE — 250N000009 HC RX 250: Performed by: RADIOLOGY

## 2025-03-18 RX ORDER — LIDOCAINE HYDROCHLORIDE AND EPINEPHRINE 10; 10 MG/ML; UG/ML
20 INJECTION, SOLUTION INFILTRATION; PERINEURAL ONCE
Status: COMPLETED | OUTPATIENT
Start: 2025-03-18 | End: 2025-03-18

## 2025-03-18 RX ORDER — GADOBUTROL 604.72 MG/ML
0.1 INJECTION INTRAVENOUS ONCE
Status: COMPLETED | OUTPATIENT
Start: 2025-03-18 | End: 2025-03-18

## 2025-03-18 RX ADMIN — LIDOCAINE HYDROCHLORIDE 10 ML: 10 INJECTION, SOLUTION INFILTRATION; PERINEURAL at 10:00

## 2025-03-18 RX ADMIN — GADOBUTROL 8 ML: 604.72 INJECTION INTRAVENOUS at 09:03

## 2025-03-18 RX ADMIN — LIDOCAINE HYDROCHLORIDE,EPINEPHRINE BITARTRATE 20 ML: 10; .01 INJECTION, SOLUTION INFILTRATION; PERINEURAL at 10:00

## 2025-03-18 NOTE — PROGRESS NOTES
After MRI Breast Biopsy, patient reports doing well.  I escorted patient up to Breast Center [address] for post-procedure mammograms.   Mammogram tech received patient and performed mammograms.      Patient tolerated mammograms well.   Post biopsy instructions and after-care reviewed with and a paper copy sent home with patient.  Biopsy site steri-strips are intact, gauze applied, ice pack applied, ace wrap applied.       Radiologist met with patient, answered any final questions and approved discharge home.  Patient was informed that RN from Breast Center will call her with pathology results within 3-5 days, the results also post automatically to her Repplerhart, and go to her ordering provider.  Patient denies further questions, verbalizes understanding and agrees with this plan.   Parents are driving patient home due to taking Valium.          Rosa Hi, RN, BSN, PHN, CBCN  Breast Center Imaging Nurse Coordinator   [address]  [place] [address]  [phone]

## 2025-03-18 NOTE — PROGRESS NOTES
Patient arrived at [facility] and checked in with reception desk for scheduled MRI Breast Biopsy.  ID wrist band was verified with patient for correct name and date of birth, and applied to patient wrist.  Patient completed MRI Breast History, and MRI Safety Questionnaire forms and signed them.  I reviewed forms and answered patient questions.      Educated patient on what to expect during the MRI Breast Biopsy procedure and after care instructions.  Reviewed patient medications and allergies, and updated them in EMR.      Patient introduced to [facility] Radiologist for consenting.  Correct procedure to be performed, correct site, name and date of birth were verified with patient.  Questions were answered, and both signed consent form with myself as witness.  Patient denies further questions, verbalizes understanding and agrees with plan to proceed with MRI Breast Biopsy.      Patient took her own prescribed Valium 5mg tablet by mouth with sip of water.      Escorted patient down to the first floor MRI Suite and introduced to MRI Tech.  MRI Tech started patient  IV, reviewed the completed MRI Safety Questionnaire Forms and then positioned patient into the MRI scanner per [facility] Radiologist's positioning instructions.      See Safety Flowsheet for procedure time out, medication administration, and all documentation during procedure.        Rosa Hi, RN, BSN, PHN, CBCN  Breast [place] Imaging Nurse Coordinator   [facility]  Direct:  [phone]

## 2025-03-18 NOTE — DISCHARGE INSTRUCTIONS
After Your Breast Biopsy    Bleeding, bruising, and pain  Breast tenderness and some bruising is normal and may last several days. You may wear your bra overnight to support the breast.  You may use an ice pack for pain. Place it over the area for 15 to 20 minutes, several times a day.  You may take over-the-counter pain medicine:  On the day of the biopsy, we recommend Tylenol (acetaminophen) because it does not raise your risk of bleeding.  The next day, you may take an anti-inflammatory medicine (aspirin, ibuprofen, Motrin, Aleve, Advil), unless your doctor tells you not to.  Bandages and showering  Keep your bandage in place until tomorrow morning. Don't get it wet.  If you have small pieces of tape on the skin, leave them in place. They will fall off on their own, or you can remove them after 5 days.  You may shower the next morning after your biopsy.  Activity  No heavy activity (no running, no gym workouts, no lifting, no vacuuming, etc.) on the day of your biopsy.  You may go back to normal activity the next day. But limit what you do if you still have pain or discomfort.  Infection  Infection is rare. Signs of infection include:  Fever (including sweats and chills)  Redness  Pain that gets worse  Fluid draining from the biopsy site  Biopsy results  Results may take up to 5 business days.  A nurse or doctor from the Breast Center will call with your results. The system sends the results to the doctor that ordered your biopsy & MyChart automatically.     If you have not gotten your results in 5 days, please call the Breast Center.  Call the Breast Center with questions or if:   You have bleeding that lasts more than 20 minutes.  You have pain that you can't control.  You have signs of infection (fever, sweats, chills, redness, increasing pain, or drainage).  After hours, please call the doctor who ordered your biopsy.     Breast Center Nurse  [phone]    For informational purposes only. Not to replace the  advice of your health care provider. Copyright   2010 [employer] [facility]. All rights reserved. Clinically reviewed by Gemini Fitzpatrick, Director, [facility] Breast Imaging. Hatch [id number] - REV 08/23.

## 2025-03-19 ENCOUNTER — PATIENT OUTREACH (OUTPATIENT)
Dept: ONCOLOGY | Facility: CLINIC | Age: 45
End: 2025-03-19
Payer: COMMERCIAL

## 2025-03-19 DIAGNOSIS — C50.919 BREAST CANCER (H): Primary | ICD-10-CM

## 2025-03-19 NOTE — TELEPHONE ENCOUNTER
[facility]: Cancer Care Initial Note                                    Discussion with Patient:                                                      Spoke with Florinda via telephone to review previously provided chemo information on Taxol. Side effects reviewed and discussed when to call. Take home compazine previously sent to pharmacy. Pt has.     Antiemetics: Reviewed        Assessment:                                                      Initial  Current living arrangement:: I live in a private home with spouse  Type of residence:: Private home - stairs  Informal Support system:: Significant other, Friends  Equipment Currently Used at Home: none  Bed or wheelchair confined:: No  Mobility Status: Independent  Transportation means:: Regular car  Medication adherence problem (GOAL):: No  Knowledgeable about how to use meds:: Yes  Medication side effects suspected:: No                  Intervention/Education provided during outreach:                                                       Will follow-up with pts FMLA form.  SW referral placed.     Patient to follow up as scheduled at next appt  Patient to call/[employer] message with updates  Confirmed patient has clinic and triage numbers    Signature:  Tabitha Sanchez RN

## 2025-03-20 ENCOUNTER — PATIENT OUTREACH (OUTPATIENT)
Dept: CARE COORDINATION | Facility: CLINIC | Age: 45
End: 2025-03-20
Payer: COMMERCIAL

## 2025-03-20 LAB
PATH REPORT.COMMENTS IMP SPEC: NORMAL
PATH REPORT.FINAL DX SPEC: NORMAL
PATH REPORT.GROSS SPEC: NORMAL
PATH REPORT.MICROSCOPIC SPEC OTHER STN: NORMAL
PATH REPORT.RELEVANT HX SPEC: NORMAL
PHOTO IMAGE: NORMAL

## 2025-03-20 NOTE — PROGRESS NOTES
Social Work - Telephone/MyChart message  [facility]  Data:   Patient Name: Florinda Keller  Goes By: Florinda TUCKER/Age: 1980 (44 year old)      Referral Source:   Dr. Tariq/Tabitha Sanchez RN  Reason for Referral:   Reason for Referral: Community Resources  Patient/Caregiver Support    Specify Community Resources: Support groups, doesnt want to exhaust help options from friends. Other SW needs     Scheduling Instructions: A  will reach out to you within three business days. If you don't hear from anyone, please call your clinic.     Patient/Caregiver Support: Resources for Support  Support for Children  Support for Coping with Illness or New Diagnosis        Intervention: Left voice message for patient on 2025 and Sent patient MyChart message on 2025 .   Plan:  will await patient's return phone call/message and provide assistance at that time.   JESSE Abraham, LICSW, OSW-C  Clinical - Adult Oncology  Phone: [phone]  She/Her/Hers  [facility]: Cristal ARELLANO  8am-4:30pm  [facility]: ROMA Gonsalez F 8am-4:30pm   Support Groups at [facility]: Social Work Services for Cancer Patients ([email or web address])

## 2025-03-21 ENCOUNTER — HOSPITAL ENCOUNTER (OUTPATIENT)
Dept: PET IMAGING | Facility: CLINIC | Age: 45
Setting detail: NUCLEAR MEDICINE
Discharge: HOME | End: 2025-03-21
Attending: INTERNAL MEDICINE | Admitting: INTERNAL MEDICINE
Payer: COMMERCIAL

## 2025-03-21 DIAGNOSIS — Z17.0 MALIGNANT NEOPLASM OF OVERLAPPING SITES OF LEFT BREAST IN FEMALE, ESTROGEN RECEPTOR POSITIVE (H): ICD-10-CM

## 2025-03-21 DIAGNOSIS — C50.812 MALIGNANT NEOPLASM OF OVERLAPPING SITES OF LEFT BREAST IN FEMALE, ESTROGEN RECEPTOR POSITIVE (H): ICD-10-CM

## 2025-03-21 PROCEDURE — 78815 PET IMAGE W/CT SKULL-THIGH: CPT | Mod: 26 | Performed by: RADIOLOGY

## 2025-03-21 PROCEDURE — 343N000001 HC RX 343 MED OP 636: Performed by: INTERNAL MEDICINE

## 2025-03-21 PROCEDURE — 78815 PET IMAGE W/CT SKULL-THIGH: CPT | Mod: PS

## 2025-03-21 PROCEDURE — A9591 FLUOROESTRADIOL F 18: HCPCS | Performed by: INTERNAL MEDICINE

## 2025-03-21 RX ORDER — IOPAMIDOL 755 MG/ML
10-135 INJECTION, SOLUTION INTRAVASCULAR ONCE
Status: DISCONTINUED | OUTPATIENT
Start: 2025-03-21 | End: 2025-03-21

## 2025-03-21 RX ADMIN — FLUOROESTRADIOL F 18 6.64 MILLICURIE: 100 INJECTION INTRAVENOUS at 13:22

## 2025-03-26 ENCOUNTER — ONCOLOGY VISIT (OUTPATIENT)
Dept: ONCOLOGY | Facility: CLINIC | Age: 45
End: 2025-03-26
Attending: FAMILY MEDICINE
Payer: COMMERCIAL

## 2025-03-26 ENCOUNTER — PATIENT OUTREACH (OUTPATIENT)
Dept: CARE COORDINATION | Facility: CLINIC | Age: 45
End: 2025-03-26

## 2025-03-26 ENCOUNTER — DOCUMENTATION ONLY (OUTPATIENT)
Dept: PHARMACY | Facility: CLINIC | Age: 45
End: 2025-03-26

## 2025-03-26 VITALS
DIASTOLIC BLOOD PRESSURE: 59 MMHG | RESPIRATION RATE: 16 BRPM | OXYGEN SATURATION: 100 % | BODY MASS INDEX: 28.19 KG/M2 | WEIGHT: 186 LBS | HEIGHT: 68 IN | HEART RATE: 69 BPM | SYSTOLIC BLOOD PRESSURE: 111 MMHG

## 2025-03-26 DIAGNOSIS — Z17.0 MALIGNANT NEOPLASM OF OVERLAPPING SITES OF LEFT BREAST IN FEMALE, ESTROGEN RECEPTOR POSITIVE (H): Primary | ICD-10-CM

## 2025-03-26 DIAGNOSIS — L65.9 ALOPECIA: ICD-10-CM

## 2025-03-26 DIAGNOSIS — C50.812 MALIGNANT NEOPLASM OF OVERLAPPING SITES OF LEFT BREAST IN FEMALE, ESTROGEN RECEPTOR POSITIVE (H): Primary | ICD-10-CM

## 2025-03-26 DIAGNOSIS — C50.912 BREAST CANCER METASTASIZED TO AXILLARY LYMPH NODE, LEFT (H): ICD-10-CM

## 2025-03-26 DIAGNOSIS — C77.3 BREAST CANCER METASTASIZED TO AXILLARY LYMPH NODE, LEFT (H): ICD-10-CM

## 2025-03-26 PROCEDURE — 99215 OFFICE O/P EST HI 40 MIN: CPT | Performed by: INTERNAL MEDICINE

## 2025-03-26 PROCEDURE — G2211 COMPLEX E/M VISIT ADD ON: HCPCS | Performed by: INTERNAL MEDICINE

## 2025-03-26 PROCEDURE — 99213 OFFICE O/P EST LOW 20 MIN: CPT | Performed by: INTERNAL MEDICINE

## 2025-03-26 RX ORDER — ALBUTEROL SULFATE 0.83 MG/ML
2.5 SOLUTION RESPIRATORY (INHALATION)
OUTPATIENT
Start: 2025-03-31

## 2025-03-26 RX ORDER — EPINEPHRINE 1 MG/ML
0.3 INJECTION, SOLUTION INTRAMUSCULAR; SUBCUTANEOUS EVERY 5 MIN PRN
OUTPATIENT
Start: 2025-03-31

## 2025-03-26 RX ORDER — HEPARIN SODIUM (PORCINE) LOCK FLUSH IV SOLN 100 UNIT/ML 100 UNIT/ML
5 SOLUTION INTRAVENOUS
OUTPATIENT
Start: 2025-03-31

## 2025-03-26 RX ORDER — LIDOCAINE AND PRILOCAINE 25; 25 MG/G; MG/G
CREAM TOPICAL
Qty: 30 G | Refills: 3 | Status: SHIPPED | OUTPATIENT
Start: 2025-03-26

## 2025-03-26 RX ORDER — DIPHENHYDRAMINE HYDROCHLORIDE 50 MG/ML
50 INJECTION, SOLUTION INTRAMUSCULAR; INTRAVENOUS
Start: 2025-03-31

## 2025-03-26 RX ORDER — DIPHENHYDRAMINE HCL 25 MG
50 CAPSULE ORAL ONCE
Start: 2025-03-31

## 2025-03-26 RX ORDER — METHYLPREDNISOLONE SODIUM SUCCINATE 40 MG/ML
40 INJECTION INTRAMUSCULAR; INTRAVENOUS
Start: 2025-03-31

## 2025-03-26 RX ORDER — LORAZEPAM 2 MG/ML
0.5 INJECTION INTRAMUSCULAR EVERY 4 HOURS PRN
OUTPATIENT
Start: 2025-03-31

## 2025-03-26 RX ORDER — MEPERIDINE HYDROCHLORIDE 25 MG/ML
25 INJECTION INTRAMUSCULAR; INTRAVENOUS; SUBCUTANEOUS
OUTPATIENT
Start: 2025-03-31

## 2025-03-26 RX ORDER — DIPHENHYDRAMINE HYDROCHLORIDE 50 MG/ML
25 INJECTION, SOLUTION INTRAMUSCULAR; INTRAVENOUS
Start: 2025-03-31

## 2025-03-26 RX ORDER — ALBUTEROL SULFATE 90 UG/1
1-2 INHALANT RESPIRATORY (INHALATION)
Start: 2025-03-31

## 2025-03-26 RX ORDER — HEPARIN SODIUM,PORCINE 10 UNIT/ML
5-20 VIAL (ML) INTRAVENOUS DAILY PRN
OUTPATIENT
Start: 2025-03-31

## 2025-03-26 ASSESSMENT — PAIN SCALES - GENERAL: PAINLEVEL_OUTOF10: MILD PAIN (2)

## 2025-03-26 NOTE — PROGRESS NOTES
Social Work - Follow-Up  [facility]    Data/Intervention:  Patient Name: Florinda Keller Goes By: Florinda    /Age: 1980 (44 year old)    Reason for Follow-Up:  This clinician met in-person with Florinda according to psychosocial plan of care    Intervention/Education/Resources Provided:  Florinda is a  woman and proud mother to 3yr old daughter. Florinda reports that she is on a short term leave from work, and is receiving a portion of her income at present time. Florinda reports that she has connected with a mentor from [employer].     SW discussed resources for: cleaning support, parenting with cancer, male caregiver coping, movement groups/connection. Active listening and emotional support provided. Florinda has plan to meet in-person with [employer].     Assessment/Plan:  1) Onc SW to send [employer] message with massage and andrew support options.   2) SW will plan for psychosocial outreach next week after C1D1. Florinda knows to outreach prior in case of concern or need.     Previously provided patient/family with writer's contact information and availability.      JESSE Abraham, [facility]  Clinical - Adult Oncology  Phone: [phone]  She/Her/Hers  [facility]: Cristal ARELLANO  8am-4:30pm  [facility]: ROMA Gonsalez F 8am-4:30pm   Support Groups at [facility]: Social Work Services for Cancer Patients ([email or web address])

## 2025-03-26 NOTE — PROGRESS NOTES
"Oncology Rooming Note    March 26, 2025 9:58 AM   Florinda Keller is a 44 year old female who presents for:    Chief Complaint   Patient presents with    Oncology Clinic Visit     Initial Vitals: /59   Pulse 69   Resp 16   Ht 1.727 m (5' 8\")   Wt 84.4 kg (186 lb)   SpO2 100%   BMI 28.28 kg/m   Estimated body mass index is 28.28 kg/m  as calculated from the following:    Height as of this encounter: 1.727 m (5' 8\").    Weight as of this encounter: 84.4 kg (186 lb). Body surface area is 2.01 meters squared.  Mild Pain (2) Comment: Data Unavailable   No LMP recorded.  Allergies reviewed: Yes  Medications reviewed: Yes    Medications: Medication refills not needed today.  Pharmacy name entered into [employer]:    [facility] [place] [facility] [address]  [facility] [address] [place] [facility] [address]    Frailty Screening:   Is the patient here for a new oncology consult visit in cancer care? 2. No    PHQ9:  Did this patient require a PHQ9?: No          Linsey Carver MA            "

## 2025-03-26 NOTE — LETTER
3/26/2025      Florinda Keller  [address] [place] [address]      Dear Colleague,    Thank you for referring your patient, Florinda Keller, to the [facility]. Please see a copy of my visit note below.    [facility]    Hematology/Oncology Established Patient Note      Today's Date: 3/26/2025    Reason for visit: Left breast cancer.    HISTORY OF PRESENT ILLNESS: Florinda Keller is a 44 year old female who presents with the following oncologic history:  1.  1/22/2025: Mammogram at [facility] obtained for increased firmness and swelling of left breast over course of 1 month.  Imaging showed at least a 4.1 cm asymmetry in the left upper outer breast and slight thickening of the left breast.  Ultrasound of left breast showed vague, irregular hypoechoic mass at 1:00, 9 cm from nipple measuring at least 4.3 x 3.8 x 3.1 cm.  No suspicious lymph nodes within left axilla.  2.  1/31/2025: Ultrasound-guided left breast core biopsy showed grade 2 invasive lobular carcinoma, angiolymphatic invasion absent, associated LCIS present, ER moderate positive at 95%, MS strongly positive at 98%, HER2 negative with IHC = 1+, Ki-67 = 17%.  MammaPrint high risk-1, luminal B, 6% absolute chemo benefit.  3.  2/20/2025: Breast MRI at [facility] showed abnormal non-mass type enhancement measuring 12 x 6 x 11 cm occupying most of anterior mid and lateral LEFT breast with tethering of underlying pectoralis major muscle but no suspicious enhancement within the  muscle itself; additional mass at 3:00 posteriorly on the left appears to represent an intramammary lymph node; mildly enlarged lymph node in the low LEFT axilla was biopsied on 3/11/2025; Sscattered foci of enhancement within the RIGHT breast with the most  discrete of these is a mass measuring up to 1.0 cm in maximal dimension in the central core, anterior to middle depth.  4. 2/25/2025: CT C/A/P and NM bone scan: reportedly negative for  metastatic disease. 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  5. 3: Left axillary lymph node biopsy positive for metastatic lobular carcinoma of breast, ER positive at 90%, AL positive at 90%, HER-2 negative with IHC = 1+.  6. 3/18/2025: Right breast biopsy at 8:00 negative for malignancy.  7. 3/21/2025: Cerianna PET scan showed Left breast mass 9.6 cm with mild PET Cerianna uptake consistent with known ER+ positive breast cancer; 12 mm left axillary lymph node with mild PET Cerianna uptake consistent with metastasis. Several additional smaller left axillary and subpectoral lymph nodes do not take up PET Cerianna and are indeterminate.    INTERVAL HISTORY:  Florinda reports no new complaints.    REVIEW OF SYSTEMS:   14 point ROS was reviewed and is negative other than as noted above in HPI.       HOME MEDICATIONS:  Current Outpatient Medications   Medication Sig Dispense Refill     diazepam (VALIUM) 5 MG tablet Take 1 tablet (5 mg) by mouth every 6 hours as needed for anxiety (take prior to mR biopsy). 2 tablet 0     levothyroxine (SYNTHROID/LEVOTHROID) 88 MCG tablet Take 88 mcg by mouth daily       lidocaine-prilocaine (EMLA) 2.5-2.5 % external cream Apply topically daily as needed for moderate pain. 30 g 3     prochlorperazine (COMPAZINE) 10 MG tablet Take 1 tablet (10 mg) by mouth every 6 hours as needed for nausea or vomiting. 30 tablet 2         ALLERGIES:  Allergies   Allergen Reactions     Animal Dander Cough, Difficulty breathing and Itching     Cat Dander Cough, Difficulty breathing and Itching     Latex      Morphine      Other (Do Not Use)      *Darvocet A500         PAST MEDICAL HISTORY:  Past Medical History:   Diagnosis Date     Breast mass, left      Hx of anxiety      Hx of ovarian cyst      Hx of panic disorder      Hypothyroidism      Invasive ductal carcinoma of breast (H)      Neurological disorder      Gynecologic history:  Age of menarche at 13, , age of first pregnancy at 41,  breast-fed her child.  Used OCPs for 26 years.  No HRT.    PAST SURGICAL HISTORY:  Past Surgical History:   Procedure Laterality Date     SKIN BIOPSY       THYROIDECTOMY, PARTIAL       WISDOM TOOTH EXTRACTION           SOCIAL HISTORY:  Social History     Socioeconomic History     Marital status:      Spouse name: Not on file     Number of children: Not on file     Years of education: Not on file     Highest education level: Not on file   Occupational History     Not on file   Tobacco Use     Smoking status: Never     Smokeless tobacco: Never   Substance and Sexual Activity     Alcohol use: Not on file     Drug use: Never     Sexual activity: Yes     Partners: Male     Birth control/protection: None   Other Topics Concern     Not on file   Social History Narrative     Not on file     Social Drivers of Health     Financial Resource Strain: Not on file   Food Insecurity: Not on file   Transportation Needs: Not on file   Physical Activity: Not on file   Stress: Not on file   Social Connections: Not on file   Interpersonal Safety: Not on file   Housing Stability: Not on file   Has 3 year-old child with  Mahogany Neely works for [employer] as a .    FAMILY HISTORY:  Family history significant for mother with breast cancer at age 72.  Paternal grandmother with breast cancer age 50's and ovarian cancer at age 85. Maternal aunt with lung cancer.    PHYSICAL EXAM:  Vital signs:  There were no vitals taken for this visit.   ECO  GENERAL/CONSTITUTIONAL: No acute distress.  EYES:  No scleral icterus.  ENT/MOUTH: Neck supple. Oropharynx grossly clear.  LYMPH: No cervical, supraclavicular, axillary adenopathy.   BREAST: Right breast negative for masses.  Left breast higher and smaller than right breast.  Palpable large and firm mass over entire breast; breast is movable from posterior chest wall.  CARDIAC: Regular rate and rhythm with no murmurs.  RESPIRATORY: Clear bilaterally.   GASTROINTESTINAL: No  hepatosplenomegaly, masses, or tenderness.  No guarding.  No distention.  MUSCULOSKELETAL: Warm and well-perfused, no cyanosis, clubbing, or edema.  NEUROLOGIC: No focal motor deficits. Alert, oriented, answers questions appropriately.  INTEGUMENTARY: No rashes or jaundice.  GAIT: Steady, does not use assistive device      LABS:  CBC RESULTS:   Recent Labs   Lab Test 02/04/22  2105   WBC 10.8   RBC 4.03   HGB 11.4*   HCT 35.4   MCV 88   MCH 28.3   MCHC 32.2   RDW 13.5        PATHOLOGY:  Reviewed as per HPI.    IMAGING:  Reviewed as per HPI.    ASSESSMENT/PLAN:  Florinda Keller is a 44 year old female with the following issues:  1.  Clinical prognostic stage IIIA, cT3-N1-MX, grade 2 invasive lobular carcinoma of the left upper outer breast, ER positive, CT positive, HER2 negative, MammaPrint high risk, Ki-67 = 17%  - I personally reviewed 3/21/2025 Cerianna PET scan and discussed with Florinda that she has uptake within the known large left breast invasive lobular carcinoma, left axillary node, but no other site and no distant metastatic disease.  --Given the very large size of this tumor, she would benefit from attempted downstaging with neoadjuvant chemotherapy.  - I advised weekly paclitaxel for 12 weeks with reimaging thereafter and likely additional chemotherapy with dose dense Adriamycin and Cytoxan given high risk Mammaprint result. She would then proceed to mastectomy under care of Dr. Nancy Shaikh. I advised an interval breast MRI after completing the paclitaxel portion of her chemo.  -Further discussed the role of adjuvant radiation therapy as well as adjuvant endocrine therapy with ovarian suppression therapy with Zoladex injections every 4 weeks in conjunction with an aromatase inhibitor such as anastrozole.  --3/11/2025 Left axillary node biopsy positive for metastatic lobular carcinoma.  --3/18/2025 Right breast biopsy was negative for malignancy.  --3/10/2025 CA 15-3 was elevated at 990.   "Discussed that although NCCN guidelines do not recommend following tumor markers for routine surveillance, following her CA 15-3 will be helpful in her specific situation for treatment decision making due to the concern for additional micrometastatic disease.    2.  Fertility discussion  -She has one child, age 3 and will not be planning any more children.    Racquel Tariq MD  [facility] Hematology/Oncology    Total time spent today: 40 minutes in chart review, patient evaluation, counseling, documentation, test and/or medication/prescription orders, and coordination of care.      The longitudinal plan of care for the diagnosis(es)/condition(s) as documented were addressed during this visit. Due to the added complexity in care, I will continue to support Florinda in the subsequent management and with ongoing continuity of care.    Oncology Rooming Note    March 26, 2025 9:58 AM   Florinda Keller is a 44 year old female who presents for:    Chief Complaint   Patient presents with     Oncology Clinic Visit     Initial Vitals: /59   Pulse 69   Resp 16   Ht 1.727 m (5' 8\")   Wt 84.4 kg (186 lb)   SpO2 100%   BMI 28.28 kg/m   Estimated body mass index is 28.28 kg/m  as calculated from the following:    Height as of this encounter: 1.727 m (5' 8\").    Weight as of this encounter: 84.4 kg (186 lb). Body surface area is 2.01 meters squared.  Mild Pain (2) Comment: Data Unavailable   No LMP recorded.  Allergies reviewed: Yes  Medications reviewed: Yes    Medications: Medication refills not needed today.  Pharmacy [facility] entered into [employer]:    [id number] [place] [facility] [address]  [facility] [address] [place] [facility] [address]    Frailty Screening:   Is the patient here for a new oncology consult visit in cancer care? 2. No    PHQ9:  Did this patient require a PHQ9?: No          Linsey Carver MA              Again, thank you for allowing me to participate in the care of your " patient.        Sincerely,        Racquel Tariq MD    Electronically signed

## 2025-03-27 ENCOUNTER — DOCUMENTATION ONLY (OUTPATIENT)
Dept: ONCOLOGY | Facility: CLINIC | Age: 45
End: 2025-03-27
Payer: COMMERCIAL

## 2025-03-28 ENCOUNTER — HOSPITAL ENCOUNTER (OUTPATIENT)
Facility: CLINIC | Age: 45
Discharge: HOME | End: 2025-03-28
Attending: SURGERY | Admitting: SURGERY
Payer: COMMERCIAL

## 2025-03-28 ENCOUNTER — APPOINTMENT (OUTPATIENT)
Dept: GENERAL RADIOLOGY | Facility: CLINIC | Age: 45
End: 2025-03-28
Attending: SURGERY
Payer: COMMERCIAL

## 2025-03-28 VITALS
SYSTOLIC BLOOD PRESSURE: 118 MMHG | WEIGHT: 186.7 LBS | HEART RATE: 68 BPM | DIASTOLIC BLOOD PRESSURE: 76 MMHG | TEMPERATURE: 97.5 F | HEIGHT: 68 IN | RESPIRATION RATE: 16 BRPM | BODY MASS INDEX: 28.3 KG/M2 | OXYGEN SATURATION: 99 %

## 2025-03-28 DIAGNOSIS — G89.18 ACUTE POST-OPERATIVE PAIN: Primary | ICD-10-CM

## 2025-03-28 LAB — HCG UR QL: NEGATIVE

## 2025-03-28 PROCEDURE — 370N000017 HC ANESTHESIA TECHNICAL FEE, PER MIN: Performed by: SURGERY

## 2025-03-28 PROCEDURE — 81025 URINE PREGNANCY TEST: CPT | Performed by: ANESTHESIOLOGY

## 2025-03-28 PROCEDURE — 710N000009 HC RECOVERY PHASE 1, LEVEL 1, PER MIN: Performed by: SURGERY

## 2025-03-28 PROCEDURE — 999N000141 HC STATISTIC PRE-PROCEDURE NURSING ASSESSMENT: Performed by: SURGERY

## 2025-03-28 PROCEDURE — 710N000012 HC RECOVERY PHASE 2, PER MINUTE: Performed by: SURGERY

## 2025-03-28 PROCEDURE — 250N000009 HC RX 250: Performed by: SURGERY

## 2025-03-28 PROCEDURE — 250N000011 HC RX IP 250 OP 636: Performed by: SURGERY

## 2025-03-28 PROCEDURE — 250N000011 HC RX IP 250 OP 636: Performed by: ANESTHESIOLOGY

## 2025-03-28 PROCEDURE — 272N000001 HC OR GENERAL SUPPLY STERILE: Performed by: SURGERY

## 2025-03-28 PROCEDURE — 258N000003 HC RX IP 258 OP 636: Performed by: SURGERY

## 2025-03-28 PROCEDURE — 999N000179 XR SURGERY CARM FLUORO LESS THAN 5 MIN W STILLS

## 2025-03-28 PROCEDURE — 360N000082 HC SURGERY LEVEL 2 W/ FLUORO, PER MIN: Performed by: SURGERY

## 2025-03-28 PROCEDURE — C1788 PORT, INDWELLING, IMP: HCPCS | Performed by: SURGERY

## 2025-03-28 DEVICE — SMARTPORT PLASTIC LOW PROFILE PORT WITH VORTEX TECHNOLOGY
Type: IMPLANTABLE DEVICE | Site: NECK | Status: FUNCTIONAL
Brand: BIOFLO VORTEX

## 2025-03-28 RX ORDER — FENTANYL CITRATE 50 UG/ML
50 INJECTION, SOLUTION INTRAMUSCULAR; INTRAVENOUS EVERY 5 MIN PRN
Status: DISCONTINUED | OUTPATIENT
Start: 2025-03-28 | End: 2025-03-28 | Stop reason: HOSPADM

## 2025-03-28 RX ORDER — APREPITANT 40 MG/1
40 CAPSULE ORAL ONCE
Status: COMPLETED | OUTPATIENT
Start: 2025-03-28 | End: 2025-03-28

## 2025-03-28 RX ORDER — SODIUM CHLORIDE, SODIUM LACTATE, POTASSIUM CHLORIDE, CALCIUM CHLORIDE 600; 310; 30; 20 MG/100ML; MG/100ML; MG/100ML; MG/100ML
INJECTION, SOLUTION INTRAVENOUS CONTINUOUS
Status: DISCONTINUED | OUTPATIENT
Start: 2025-03-28 | End: 2025-03-28 | Stop reason: HOSPADM

## 2025-03-28 RX ORDER — FENTANYL CITRATE 50 UG/ML
25 INJECTION, SOLUTION INTRAMUSCULAR; INTRAVENOUS
Status: DISCONTINUED | OUTPATIENT
Start: 2025-03-28 | End: 2025-03-28 | Stop reason: HOSPADM

## 2025-03-28 RX ORDER — MAGNESIUM HYDROXIDE 1200 MG/15ML
LIQUID ORAL PRN
Status: DISCONTINUED | OUTPATIENT
Start: 2025-03-28 | End: 2025-03-28 | Stop reason: HOSPADM

## 2025-03-28 RX ORDER — AMOXICILLIN 250 MG
1 CAPSULE ORAL 2 TIMES DAILY
Qty: 10 TABLET | Refills: 0 | Status: SHIPPED | OUTPATIENT
Start: 2025-03-28 | End: 2025-03-31

## 2025-03-28 RX ORDER — ONDANSETRON 4 MG/1
4 TABLET, ORALLY DISINTEGRATING ORAL EVERY 30 MIN PRN
Status: DISCONTINUED | OUTPATIENT
Start: 2025-03-28 | End: 2025-03-28 | Stop reason: HOSPADM

## 2025-03-28 RX ORDER — HEPARIN SODIUM 1000 [USP'U]/ML
INJECTION, SOLUTION INTRAVENOUS; SUBCUTANEOUS
Status: DISCONTINUED
Start: 2025-03-28 | End: 2025-03-28 | Stop reason: HOSPADM

## 2025-03-28 RX ORDER — LIDOCAINE 40 MG/G
CREAM TOPICAL
Status: DISCONTINUED | OUTPATIENT
Start: 2025-03-28 | End: 2025-03-28 | Stop reason: HOSPADM

## 2025-03-28 RX ORDER — NALOXONE HYDROCHLORIDE 0.4 MG/ML
0.1 INJECTION, SOLUTION INTRAMUSCULAR; INTRAVENOUS; SUBCUTANEOUS
Status: DISCONTINUED | OUTPATIENT
Start: 2025-03-28 | End: 2025-03-28 | Stop reason: HOSPADM

## 2025-03-28 RX ORDER — HEPARIN SODIUM (PORCINE) LOCK FLUSH IV SOLN 100 UNIT/ML 100 UNIT/ML
SOLUTION INTRAVENOUS
Status: DISCONTINUED
Start: 2025-03-28 | End: 2025-03-28 | Stop reason: HOSPADM

## 2025-03-28 RX ORDER — ONDANSETRON 2 MG/ML
4 INJECTION INTRAMUSCULAR; INTRAVENOUS EVERY 30 MIN PRN
Status: DISCONTINUED | OUTPATIENT
Start: 2025-03-28 | End: 2025-03-28 | Stop reason: HOSPADM

## 2025-03-28 RX ORDER — OXYCODONE HYDROCHLORIDE 5 MG/1
2.5-5 TABLET ORAL EVERY 4 HOURS PRN
Qty: 5 TABLET | Refills: 0 | Status: SHIPPED | OUTPATIENT
Start: 2025-03-28 | End: 2025-03-31

## 2025-03-28 RX ORDER — LIDOCAINE HYDROCHLORIDE 10 MG/ML
INJECTION, SOLUTION INFILTRATION; PERINEURAL
Status: DISCONTINUED
Start: 2025-03-28 | End: 2025-03-28 | Stop reason: HOSPADM

## 2025-03-28 RX ORDER — FENTANYL CITRATE 50 UG/ML
25 INJECTION, SOLUTION INTRAMUSCULAR; INTRAVENOUS EVERY 5 MIN PRN
Status: DISCONTINUED | OUTPATIENT
Start: 2025-03-28 | End: 2025-03-28 | Stop reason: HOSPADM

## 2025-03-28 RX ORDER — DEXAMETHASONE SODIUM PHOSPHATE 4 MG/ML
4 INJECTION, SOLUTION INTRA-ARTICULAR; INTRALESIONAL; INTRAMUSCULAR; INTRAVENOUS; SOFT TISSUE
Status: DISCONTINUED | OUTPATIENT
Start: 2025-03-28 | End: 2025-03-28 | Stop reason: HOSPADM

## 2025-03-28 RX ORDER — CEFAZOLIN SODIUM/WATER 2 G/20 ML
2 SYRINGE (ML) INTRAVENOUS
Status: COMPLETED | OUTPATIENT
Start: 2025-03-28 | End: 2025-03-28

## 2025-03-28 RX ADMIN — APREPITANT 40 MG: 40 CAPSULE ORAL at 06:25

## 2025-03-28 ASSESSMENT — ACTIVITIES OF DAILY LIVING (ADL)
ADLS_ACUITY_SCORE: 42

## 2025-03-28 NOTE — DISCHARGE INSTRUCTIONS
Same Day Surgery Discharge Instructions for  Sedation and General Anesthesia     It's not unusual to feel dizzy, light-headed or faint for up to 24 hours after surgery or while taking pain medication.  If you have these symptoms: sit for a few minutes before standing and have someone assist you when you get up to walk or use the bathroom.    You should rest and relax for the next 24 hours. We recommend you make arrangements to have an adult stay with you for at least 24 hours after your discharge.  Avoid hazardous and strenuous activity.    DO NOT DRIVE any vehicle or operate mechanical equipment for 24 hours following the end of your surgery.  Even though you may feel normal, your reactions may be affected by the medication you have received.    Do not drink alcoholic beverages for 24 hours following surgery.     Slowly progress to your regular diet as you feel able. It's not unusual to feel nauseated and/or vomit after receiving anesthesia.  If you develop these symptoms, drink clear liquids (apple juice, ginger ale, broth, 7-up, etc. ) until you feel better.  If your nausea and vomiting persists for 24 hours, please notify your surgeon.      All narcotic pain medications, along with inactivity and anesthesia, can cause constipation. Drinking plenty of liquids and increasing fiber intake will help.    For any questions of a medical nature, call your surgeon.    Do not make important decisions for 24 hours.    If you had general anesthesia, you may have a sore throat for a couple of days related to the breathing tube used during surgery.  You may use Cepacol lozenges to help with this discomfort.  If it worsens or if you develop a fever, contact your surgeon.     If you feel your pain is not well managed with the pain medications prescribed by your surgeon, please contact your surgeon's office to let them know so they can address your concerns.       [facility]  Discharge Instructions:  Post-Operative Port Placement    ACTIVITY  Take frequent, short walks and increase your activity gradually.    Avoid strenuous physical activity or heavy lifting greater than 15-20 lbs. for 1 week.  You may climb stairs.  You may drive without restrictions when you are not using any prescription pain medication and feel comfortable in a car.  You may return to work/school when you are comfortable without any prescription pain medication.    WOUND CARE  If you have skin glue on your incisions, you may shower today.  The skin glue, it will peel up and fall off on its own.  If you have steri-strips (looks like white tape) on your incisions, you may shower in 48 hours.  Pat your incisions dry and leave them open to air.  Re-apply dressing (Band-Aids or gauze/tape) as needed for comfort or drainage.  You may peel off the steri-strips 2 weeks after your surgery if they have not peeled off on their own.    Do not soak your incisions in a tub or pool for 2 weeks.   Do not apply any lotions, creams, or ointments to your incision(s).  A ridge under your incision(s) is normal and will gradually resolve.    DIET  Start with liquids, then gradually resume your regular diet as tolerated.   Drink plenty of liquids to stay hydrated.    PAIN  Expect some tenderness and discomfort at the incision site(s).  Use the prescribed pain medication at your discretion.  Expect gradual resolution of your pain over several days.  You may take ibuprofen with food (unless you have been told not to) or acetaminophen/Tylenol instead of or in addition to your prescribed pain medication.  However, if you are taking Norco or Percocet, do not take any additional acetaminophen/Tylenol.  Do not drink alcohol or drive while you are taking pain medications.  You may apply ice to your incisions in 20 minute intervals as needed for the next 48 hours.  After that time, consider switching to heat if you prefer.    EXPECTATIONS  Pain medications can cause  constipation.  Limit use when possible.  Take an over the counter or prescribed stool softener/stimulant, such as Colace or Senna, 1-2 times a day with plenty of water.  You may take a mild over the counter laxative, such as Miralax or a suppository, as needed.    You may discontinue these medications once you are having regular bowel movements and/or are no longer taking your narcotic pain medication.    FOLLOW UP  You do not need to follow up with our office unless you have questions or concerns.    CALL OUR OFFICE -[phone] IF YOU HAVE:   Chills or fever above 101 F.  Increased redness, warmth, or drainage at your incisions.  Significant bleeding.  Pain not relieved by your pain medication or rest.  Increasing pain after the first 48 hours.  Any other concerns or questions.             Revised October 2022           **If you have concerns or questions about your procedure,    please contact Dr Maynard at  [phone]**

## 2025-03-28 NOTE — BRIEF OP NOTE
[facility]    Brief Operative Note    Pre-operative diagnosis: Malignant neoplasm of upper-outer quadrant of left breast in female, estrogen receptor positive (H) [C50.412, Z17.0]  Post-operative diagnosis Same as pre-operative diagnosis    Procedure: INSERTION, VASCULAR ACCESS PORT, Right - Neck    Surgeon: Surgeons and Role:     * Malik Maynard MD - Primary     * Nicki Mohan MD - Assisting  Anesthesia: MAC   Estimated Blood Loss: 10 mL from 3/28/2025  7:25 AM to 3/28/2025  8:25 AM      Drains: None  Specimens: * No specimens in log *  Findings:   RIJ port placement with confirmation of catheter in right atrium via xray .  Complications: None.  Implants:   Implant Name Type Inv. Item Serial No.  Lot No. LRB No. Used Action   CATH PORT SMART VORTEX LOW PROFILE 8FR [id number] - [id number] Catheter CATH PORT SMART VORTEX LOW PROFILE 8FR [id number]  [id number] 1 Implanted

## 2025-03-28 NOTE — OR NURSING
Top incision becoming more painful, slightly swollen, difficult to turn her head to the side.   BONITA Tay, called and here to assess incision. Old glue taken off and steri stripes placed, pressure dressing and ice applied. Will observe for 1 hour to assess incision.

## 2025-03-28 NOTE — OP NOTE
General Surgery Operative Note    PREOPERATIVE DIAGNOSIS:  Malignant neoplasm of upper-outer quadrant of left breast in female, estrogen receptor positive (H) [C50.412, Z17.0]    POSTOPERATIVE DIAGNOSIS: Same    PROCEDURE:  Venous port placement with ultrasound and fluoroscopic guidance    ANESTHESIA:  MAC and local    PREOPERATIVE MEDICATIONS: Ancef    SURGEON:  Malik Maynard MD    ASSISTANT: Lizzie Mohan MD first assistant was necessary due to challenging exposure and the need for improved visualization and help maintaining hemostasis.    INDICATIONS: Patient was recently diagnosed with locally advanced left breast cancer.  PET scan was recently completed and did not show evidence of distant metastatic disease.  Patient now presents for port placement.    DESCRIPTION OF PROCEDURE: The patient was placed on the operating room table. The bilateral periclavicular areas and neck were prepped and draped in a sterile fashion. We anesthetized the area in the right neck and were able to visualize the internal jugular vein with ultrasound.  The vein was cannulated and a guidewire was advanced until ectopy was noted. Guidewire was visualized with C-arm in the SVC.  We anesthetized an area down toward the chest wall and made an incision down to the level of the pectoralis muscle. This was dissected down until a pocket was created. We tunneled our Port-A-Cath catheter under the skin from the area of the port to the jugular insertion site. The tunneling device was then removed. We then advanced the dilator sheath over the wire and removed the wire. The catheter was then advanced through the sheath until it was approximately 20 cm at the level of the skin of the neck. The sheath was split and removed. We then cut the catheter at 22 cm at the level of the port and fastened it to the port. The port was then secured to the pectoralis muscle using a pair of 2-0 Vicryl sutures. We accessed the port and were able to easily draw back  blood. Path of the catheter was visualized with C-arm and the tip was seen near the atrio-caval junction.  The port was flushed with 5 cc of straight heparin.  Skin edges were all then reapproximated using 3-0 and 4-0 Vicryl and Steri-Strips.  At the conclusion of the case, all lap and needle counts were correct.     ESTIMATED BLOOD LOSS: 5 mL    INTRAOPERATIVE FINDINGS: Good placement of venous port.  Tip of the catheter at the atriocaval junction.    Malik Maynard MD, MD

## 2025-03-28 NOTE — OR NURSING
Meds packet opened and gone over with patient and her mother with discharge instructions given over the phone to her mom, Heather.  No further swelling of superior incision that was redressed in Phase I pacu.   IV removed.  Patient urinated 350 ml clear urine.  Meds taped and placed in belongings bag with ice pack.  All questions answered to patient's and mother's satisfaction.  Saturation of 99% on room air.  Ambulating without difficulty.  In wheelchair, escorted to car  by OWEN Abraham

## 2025-03-31 ENCOUNTER — INFUSION THERAPY VISIT (OUTPATIENT)
Dept: INFUSION THERAPY | Facility: CLINIC | Age: 45
End: 2025-03-31
Attending: INTERNAL MEDICINE
Payer: COMMERCIAL

## 2025-03-31 ENCOUNTER — ALLIED HEALTH/NURSE VISIT (OUTPATIENT)
Dept: ONCOLOGY | Facility: CLINIC | Age: 45
End: 2025-03-31

## 2025-03-31 VITALS
HEART RATE: 84 BPM | OXYGEN SATURATION: 100 % | BODY MASS INDEX: 28.4 KG/M2 | DIASTOLIC BLOOD PRESSURE: 78 MMHG | WEIGHT: 186.8 LBS | SYSTOLIC BLOOD PRESSURE: 124 MMHG | RESPIRATION RATE: 16 BRPM | TEMPERATURE: 98.2 F

## 2025-03-31 DIAGNOSIS — C50.812 MALIGNANT NEOPLASM OF OVERLAPPING SITES OF LEFT BREAST IN FEMALE, ESTROGEN RECEPTOR POSITIVE (H): Primary | ICD-10-CM

## 2025-03-31 DIAGNOSIS — Z17.0 MALIGNANT NEOPLASM OF OVERLAPPING SITES OF LEFT BREAST IN FEMALE, ESTROGEN RECEPTOR POSITIVE (H): Primary | ICD-10-CM

## 2025-03-31 DIAGNOSIS — Z00.6 EXAM FOR CLINICAL TRIAL: Primary | ICD-10-CM

## 2025-03-31 LAB
ALBUMIN SERPL BCG-MCNC: 4.2 G/DL (ref 3.5–5.2)
ALP SERPL-CCNC: 63 U/L (ref 40–150)
ALT SERPL W P-5'-P-CCNC: 18 U/L (ref 0–50)
AST SERPL W P-5'-P-CCNC: 23 U/L (ref 0–45)
BASOPHILS # BLD AUTO: 0 10E3/UL (ref 0–0.2)
BASOPHILS NFR BLD AUTO: 0 %
BILIRUB DIRECT SERPL-MCNC: 0.21 MG/DL (ref 0–0.3)
BILIRUB SERPL-MCNC: 0.8 MG/DL
EOSINOPHIL # BLD AUTO: 0.2 10E3/UL (ref 0–0.7)
EOSINOPHIL NFR BLD AUTO: 3 %
ERYTHROCYTE [DISTWIDTH] IN BLOOD BY AUTOMATED COUNT: 12.5 % (ref 10–15)
HCT VFR BLD AUTO: 38.5 % (ref 35–47)
HGB BLD-MCNC: 12.9 G/DL (ref 11.7–15.7)
IMM GRANULOCYTES # BLD: 0 10E3/UL
IMM GRANULOCYTES NFR BLD: 1 %
LYMPHOCYTES # BLD AUTO: 1.8 10E3/UL (ref 0.8–5.3)
LYMPHOCYTES NFR BLD AUTO: 22 %
MCH RBC QN AUTO: 30.2 PG (ref 26.5–33)
MCHC RBC AUTO-ENTMCNC: 33.5 G/DL (ref 31.5–36.5)
MCV RBC AUTO: 90 FL (ref 78–100)
MONOCYTES # BLD AUTO: 0.4 10E3/UL (ref 0–1.3)
MONOCYTES NFR BLD AUTO: 5 %
NEUTROPHILS # BLD AUTO: 5.5 10E3/UL (ref 1.6–8.3)
NEUTROPHILS NFR BLD AUTO: 69 %
NRBC # BLD AUTO: 0 10E3/UL
NRBC BLD AUTO-RTO: 0 /100
PLATELET # BLD AUTO: 306 10E3/UL (ref 150–450)
PROT SERPL-MCNC: 6.9 G/DL (ref 6.4–8.3)
RBC # BLD AUTO: 4.27 10E6/UL (ref 3.8–5.2)
WBC # BLD AUTO: 7.9 10E3/UL (ref 4–11)

## 2025-03-31 PROCEDURE — 85004 AUTOMATED DIFF WBC COUNT: CPT | Performed by: INTERNAL MEDICINE

## 2025-03-31 PROCEDURE — 96367 TX/PROPH/DG ADDL SEQ IV INF: CPT

## 2025-03-31 PROCEDURE — 96372 THER/PROPH/DIAG INJ SC/IM: CPT | Performed by: INTERNAL MEDICINE

## 2025-03-31 PROCEDURE — 80076 HEPATIC FUNCTION PANEL: CPT | Performed by: INTERNAL MEDICINE

## 2025-03-31 PROCEDURE — 96415 CHEMO IV INFUSION ADDL HR: CPT

## 2025-03-31 PROCEDURE — 258N000003 HC RX IP 258 OP 636: Performed by: INTERNAL MEDICINE

## 2025-03-31 PROCEDURE — 250N000013 HC RX MED GY IP 250 OP 250 PS 637: Performed by: INTERNAL MEDICINE

## 2025-03-31 PROCEDURE — 96375 TX/PRO/DX INJ NEW DRUG ADDON: CPT

## 2025-03-31 PROCEDURE — 96376 TX/PRO/DX INJ SAME DRUG ADON: CPT

## 2025-03-31 PROCEDURE — 96413 CHEMO IV INFUSION 1 HR: CPT

## 2025-03-31 PROCEDURE — 250N000011 HC RX IP 250 OP 636: Performed by: INTERNAL MEDICINE

## 2025-03-31 RX ORDER — DIPHENHYDRAMINE HYDROCHLORIDE 50 MG/ML
50 INJECTION, SOLUTION INTRAMUSCULAR; INTRAVENOUS ONCE
Status: COMPLETED | OUTPATIENT
Start: 2025-03-31 | End: 2025-03-31

## 2025-03-31 RX ORDER — DIPHENHYDRAMINE HYDROCHLORIDE 50 MG/ML
25 INJECTION, SOLUTION INTRAMUSCULAR; INTRAVENOUS
Status: COMPLETED | OUTPATIENT
Start: 2025-03-31 | End: 2025-03-31

## 2025-03-31 RX ORDER — EPINEPHRINE 1 MG/ML
0.3 INJECTION, SOLUTION INTRAMUSCULAR; SUBCUTANEOUS EVERY 5 MIN PRN
Status: DISCONTINUED | OUTPATIENT
Start: 2025-03-31 | End: 2025-03-31 | Stop reason: HOSPADM

## 2025-03-31 RX ORDER — METHYLPREDNISOLONE SODIUM SUCCINATE 40 MG/ML
40 INJECTION INTRAMUSCULAR; INTRAVENOUS
Status: COMPLETED | OUTPATIENT
Start: 2025-03-31 | End: 2025-03-31

## 2025-03-31 RX ORDER — HEPARIN SODIUM (PORCINE) LOCK FLUSH IV SOLN 100 UNIT/ML 100 UNIT/ML
5 SOLUTION INTRAVENOUS
Status: DISCONTINUED | OUTPATIENT
Start: 2025-03-31 | End: 2025-03-31 | Stop reason: HOSPADM

## 2025-03-31 RX ORDER — ALBUTEROL SULFATE 90 UG/1
1-2 INHALANT RESPIRATORY (INHALATION)
Status: COMPLETED | OUTPATIENT
Start: 2025-03-31 | End: 2025-03-31

## 2025-03-31 RX ADMIN — SODIUM CHLORIDE 250 ML: 0.9 INJECTION, SOLUTION INTRAVENOUS at 09:35

## 2025-03-31 RX ADMIN — Medication 5 ML: at 13:35

## 2025-03-31 RX ADMIN — ALBUTEROL SULFATE 2 PUFF: 108 AEROSOL, METERED RESPIRATORY (INHALATION) at 10:41

## 2025-03-31 RX ADMIN — METHYLPREDNISOLONE SODIUM SUCCINATE 40 MG: 40 INJECTION, POWDER, FOR SOLUTION INTRAMUSCULAR; INTRAVENOUS at 10:38

## 2025-03-31 RX ADMIN — DEXAMETHASONE SODIUM PHOSPHATE: 10 INJECTION, SOLUTION INTRAMUSCULAR; INTRAVENOUS at 09:49

## 2025-03-31 RX ADMIN — DIPHENHYDRAMINE HYDROCHLORIDE 50 MG: 50 INJECTION, SOLUTION INTRAMUSCULAR; INTRAVENOUS at 09:35

## 2025-03-31 RX ADMIN — SODIUM CHLORIDE 1000 ML: 0.9 INJECTION, SOLUTION INTRAVENOUS at 10:41

## 2025-03-31 RX ADMIN — FAMOTIDINE 20 MG: 10 INJECTION, SOLUTION INTRAVENOUS at 10:38

## 2025-03-31 RX ADMIN — EPINEPHRINE 0.3 MG: 1 INJECTION INTRAMUSCULAR; INTRAVENOUS; SUBCUTANEOUS at 10:37

## 2025-03-31 RX ADMIN — PACLITAXEL 161 MG: 6 INJECTION, SOLUTION INTRAVENOUS at 10:27

## 2025-03-31 RX ADMIN — DIPHENHYDRAMINE HYDROCHLORIDE 25 MG: 50 INJECTION, SOLUTION INTRAMUSCULAR; INTRAVENOUS at 10:37

## 2025-03-31 RX ADMIN — FAMOTIDINE 20 MG: 10 INJECTION, SOLUTION INTRAVENOUS at 09:35

## 2025-03-31 ASSESSMENT — PAIN SCALES - GENERAL: PAINLEVEL_OUTOF10: MILD PAIN (2)

## 2025-03-31 NOTE — PROGRESS NOTES
Take Home Medication Teaching    Patient was educated on the following oral medications: compazine on March 31, 2025. Teaching provided to patient included indication, dose, administration, adverse effects and side effect management. Written materials were provided and patient was given the opportunity to ask questions. Patient verbalized understanding of the information presented.      Antony Muse, PharmD BCPS

## 2025-03-31 NOTE — PROGRESS NOTES
Nursing Note:  Florinda ARELLANO Krishna presents today for port labs.    Patient seen by provider today: No   present during visit today: Not Applicable.    Note: N/A.    Intravenous Access:  Labs drawn without difficulty.  Implanted Port.    Discharge Plan:   Patient was sent to [facility] for infusion appointment.    Elisabet Bourgeois RN

## 2025-03-31 NOTE — PROGRESS NOTES
Met with patient to consent for clinical trial EXActDNA-003: Breast Cancer Clinical Validation Study to Predict Recurrence of High-Risk Early Breast Cancer Treated with Neoadjuvant Therapy Using a [place] Circulating Tumor DNA Assay to Detect Molecular Residual Disease and FM3918/COSMIC: Complementary Options for Symptom Management In Cancer (COSMIC): Assessing Benefits and Harms of Cannabis and Cannabinoid Use Among a Cohort of Cancer Patients Treated in Community Oncology Clinics.  Consents for both studies were reviewed in their entirety, patients' questions answered to the best of writer's ability.  Patient signed both consent and marked yes for use of samples for future research. Copy of consents were provided to the patient.    ECOG is 0   First study draw for EXActDNA trial to be completed today prior to first treatment.

## 2025-03-31 NOTE — PROGRESS NOTES
Infusion Nursing Note:  Florinda Keller presents today for C1D1 Taxol.    Patient seen by provider today: No   present during visit today: Not Applicable.    Note: First time getting chemotherapy today. Oriented to infusion center. Chemotherapy teaching, side effects, and schedule reviewed with patient. General and individual chemotherapy side effects reviewed with patient including fatigue, immunosuppression, alopecia, nausea/vomiting, constipation/diarrhea and potential taxol reaction. Pt instructed to call care coordinator, triage (or MD on call if after hours/weekends) with chills/temp >=100.5, questions/concerns. Pt stated understanding of plan.         10:32am: about 1ml of Taxol in, pt's face flushed and having breathing difficulty with chest tightness,throat tightness and coughing. Taxol stopped immediately, NS free flowing. Benadryl 25mg, pepcid 20mg and solumedrol 40mg given. However respiratory distress not improving, hoarseness, O2 sat 93-94%. Epi given once, O2 2L NC applied, Maciel Queen, NP notified.  Albuterol haler x2.   breathing improved in 5 min after EPi with O2 sat 98%.    11:36am: respiratory status stable. All symptoms went away. Taxol restarted at 25ml/hr for 15 min    11:50am: stable, Taxol 50ml/hr for 15 min then increased the rate every 15 min by 50ml/hr to max of 301ml/hr    1:30pm - Taxol infusion completed.     In-basket message sent to  and Tabitha SALAMANCA RNCC with reaction event and question for future plan.     Intravenous Access:  Implanted Port.    Treatment Conditions:  Lab Results   Component Value Date    HGB 12.9 03/31/2025    WBC 7.9 03/31/2025    ANEUTAUTO 5.5 03/31/2025     03/31/2025        Lab Results   Component Value Date     03/10/2025    POTASSIUM 4.3 03/10/2025    CR 0.65 03/10/2025    SJ 9.2 03/10/2025    BILITOTAL 0.8 03/31/2025    ALBUMIN 4.2 03/31/2025    ALT 18 03/31/2025    AST 23 03/31/2025       Results reviewed, labs MET treatment  parameters, ok to proceed with treatment.      Post Infusion Assessment:  Patient tolerated infusion poorly due to : Hypersensitivity: Did patient have a hypersensitivity reaction? : Yes  Drug or Product name: Taxol  Were pre-meds administered?: Yes  What pre-meds were administered?: Diphenhydramine (Benadryl), Dexamethasone (decadron, Famotidine (Pepcid), Ondansetron (Zofran)  First or Subsequent treatment: First time receiving  Rate of infusion when patient had hypersensitivity reaction: 50  Time the hypersensitivity reaction was first recognized: 1032  Symptoms observed or reported (select all that apply): Chest tightness, Flushing (throat tightness, hoarseness, cough)  Interventions/treatment following reaction: Infusion stopped, Hypersensitivity medications administered, Oxygen applied, Additional observation period added  What hypersensitivity medications were administered?: Albuterol inhaler, DiphendydrAMINE (benadryl), Epinephrine, Methylprednisolone, NaCl 0.9% Bolus, Famotidine(Pepcid)  Name of provider notified: Maciel Queen NP  Time provider notified: 1040  Type of notification (select all that apply): Provider at bedside  Was the patient re-challenged today?: Yes - tolerated well  Blood return noted pre and post infusion.  Site patent and intact, free from redness, edema or discomfort.  No evidence of extravasations.  Access discontinued per protocol.       Discharge Plan:   Discharge instructions reviewed with: Patient.  Patient and/or family verbalized understanding of discharge instructions and all questions answered.  AVS to patient via [employer].  Patient will return 4/7/25 for next appointment.   Patient discharged in stable condition accompanied by: self and friend.  Departure Mode: Ambulatory.      Latrell Escalante RN

## 2025-04-01 ENCOUNTER — TELEPHONE (OUTPATIENT)
Dept: SURGERY | Facility: CLINIC | Age: 45
End: 2025-04-01
Payer: COMMERCIAL

## 2025-04-01 NOTE — TELEPHONE ENCOUNTER
Name of caller: Patient    Reason for Call:  call back  It seems like to surgical glue/tape is coming off. Wondering if ok? Or is it too soon?    Surgeon:  Malik Maynard MD    Recent Surgery:  Yes.    If yes, when & what type:  3/28/25    INSERTION, VASCULAR ACCESS PORT       Best phone number to reach pt at is: [phone]  Ok to leave a message with medical info? Yes.

## 2025-04-01 NOTE — TELEPHONE ENCOUNTER
Florinda had her port placed on 3/28/25 with Dr. Maynard (Dr. Shaikh was out of the office). The surgical glue that was on the incision is mostly gone. She had chemo yesterday and the RN used a lidocaine cream and Florinda believes that make the glue flake off.     She is not having any drainage. Her incision still looks good, no concerns. She will leave it open to air.     We discussed that this should be fine and she can just leave it open to air. I will send a FYI message to Dr. Maynard and if he has any concerns/changes, we will reach out, but otherwise she should just continue to monitor it.     Pt grateful for the call back and is agreeable to the plan.     Kalie Torrez, RN, BSN, PHN  Breast Care Nurse Coordinator  [facility]- [facility] [place]

## 2025-04-06 ENCOUNTER — INFUSION THERAPY VISIT (OUTPATIENT)
Dept: INFUSION THERAPY | Facility: CLINIC | Age: 45
End: 2025-04-06
Attending: INTERNAL MEDICINE
Payer: COMMERCIAL

## 2025-04-06 DIAGNOSIS — C50.812 MALIGNANT NEOPLASM OF OVERLAPPING SITES OF LEFT BREAST IN FEMALE, ESTROGEN RECEPTOR POSITIVE (H): Primary | ICD-10-CM

## 2025-04-06 DIAGNOSIS — Z17.0 MALIGNANT NEOPLASM OF OVERLAPPING SITES OF LEFT BREAST IN FEMALE, ESTROGEN RECEPTOR POSITIVE (H): Primary | ICD-10-CM

## 2025-04-06 LAB
BASOPHILS # BLD AUTO: 0 10E3/UL (ref 0–0.2)
BASOPHILS NFR BLD AUTO: 1 %
EOSINOPHIL # BLD AUTO: 0.1 10E3/UL (ref 0–0.7)
EOSINOPHIL NFR BLD AUTO: 1 %
ERYTHROCYTE [DISTWIDTH] IN BLOOD BY AUTOMATED COUNT: 12.8 % (ref 10–15)
HCT VFR BLD AUTO: 37.9 % (ref 35–47)
HGB BLD-MCNC: 13 G/DL (ref 11.7–15.7)
IMM GRANULOCYTES # BLD: 0.1 10E3/UL
IMM GRANULOCYTES NFR BLD: 1 %
LYMPHOCYTES # BLD AUTO: 1.8 10E3/UL (ref 0.8–5.3)
LYMPHOCYTES NFR BLD AUTO: 23 %
MCH RBC QN AUTO: 30.7 PG (ref 26.5–33)
MCHC RBC AUTO-ENTMCNC: 34.3 G/DL (ref 31.5–36.5)
MCV RBC AUTO: 89 FL (ref 78–100)
MONOCYTES # BLD AUTO: 0.3 10E3/UL (ref 0–1.3)
MONOCYTES NFR BLD AUTO: 4 %
NEUTROPHILS # BLD AUTO: 5.5 10E3/UL (ref 1.6–8.3)
NEUTROPHILS NFR BLD AUTO: 71 %
NRBC # BLD AUTO: 0 10E3/UL
NRBC BLD AUTO-RTO: 0 /100
PLATELET # BLD AUTO: 306 10E3/UL (ref 150–450)
RBC # BLD AUTO: 4.24 10E6/UL (ref 3.8–5.2)
WBC # BLD AUTO: 7.8 10E3/UL (ref 4–11)

## 2025-04-06 PROCEDURE — 36415 COLL VENOUS BLD VENIPUNCTURE: CPT | Performed by: INTERNAL MEDICINE

## 2025-04-06 PROCEDURE — 85004 AUTOMATED DIFF WBC COUNT: CPT | Performed by: INTERNAL MEDICINE

## 2025-04-06 NOTE — PROGRESS NOTES
Infusion Nursing Note:  Florinda Keller presents today for Peripheral labs.    Patient seen by provider today: No   present during visit today: Not Applicable.    Note: N/A.      Intravenous Access:  Lab draw site right AC, Needle type butterfly, Gauge 23.    Treatment Conditions:  Lab Results   Component Value Date    HGB 13.0 04/06/2025    WBC 7.8 04/06/2025    ANEUTAUTO 5.5 04/06/2025     04/06/2025        Results reviewed, labs MET treatment parameters, ok to proceed with treatment.      Post Infusion Assessment:  No evidence of extravasations.  Access discontinued per protocol.       Discharge Plan:   Patient declined prescription refills.  Discharge instructions reviewed with: Patient and Family.  Patient and/or family verbalized understanding of discharge instructions and all questions answered.  AVS to patient via [employer].  Patient will return 4/7/25 to see Reena MESA and C2D1: Taxol for next appointment.   Patient discharged in stable condition accompanied by: mother.  Departure Mode: Ambulatory.      Dionne Fontaine RN

## 2025-04-07 ENCOUNTER — INFUSION THERAPY VISIT (OUTPATIENT)
Dept: INFUSION THERAPY | Facility: CLINIC | Age: 45
End: 2025-04-07
Attending: INTERNAL MEDICINE
Payer: COMMERCIAL

## 2025-04-07 ENCOUNTER — ONCOLOGY VISIT (OUTPATIENT)
Dept: ONCOLOGY | Facility: CLINIC | Age: 45
End: 2025-04-07
Attending: INTERNAL MEDICINE
Payer: COMMERCIAL

## 2025-04-07 VITALS
OXYGEN SATURATION: 99 % | BODY MASS INDEX: 28.83 KG/M2 | WEIGHT: 189.6 LBS | TEMPERATURE: 98.3 F | DIASTOLIC BLOOD PRESSURE: 73 MMHG | RESPIRATION RATE: 16 BRPM | SYSTOLIC BLOOD PRESSURE: 120 MMHG | HEART RATE: 80 BPM

## 2025-04-07 DIAGNOSIS — Z17.0 MALIGNANT NEOPLASM OF OVERLAPPING SITES OF LEFT BREAST IN FEMALE, ESTROGEN RECEPTOR POSITIVE (H): Primary | ICD-10-CM

## 2025-04-07 DIAGNOSIS — C50.812 MALIGNANT NEOPLASM OF OVERLAPPING SITES OF LEFT BREAST IN FEMALE, ESTROGEN RECEPTOR POSITIVE (H): Primary | ICD-10-CM

## 2025-04-07 LAB — CANCER AG15-3 SERPL-ACNC: 884 U/ML

## 2025-04-07 PROCEDURE — 258N000003 HC RX IP 258 OP 636

## 2025-04-07 PROCEDURE — 86300 IMMUNOASSAY TUMOR CA 15-3: CPT

## 2025-04-07 PROCEDURE — 96415 CHEMO IV INFUSION ADDL HR: CPT

## 2025-04-07 PROCEDURE — G2211 COMPLEX E/M VISIT ADD ON: HCPCS

## 2025-04-07 PROCEDURE — 96367 TX/PROPH/DG ADDL SEQ IV INF: CPT

## 2025-04-07 PROCEDURE — 99213 OFFICE O/P EST LOW 20 MIN: CPT

## 2025-04-07 PROCEDURE — 96375 TX/PRO/DX INJ NEW DRUG ADDON: CPT

## 2025-04-07 PROCEDURE — 250N000013 HC RX MED GY IP 250 OP 250 PS 637

## 2025-04-07 PROCEDURE — 250N000011 HC RX IP 250 OP 636

## 2025-04-07 PROCEDURE — 96413 CHEMO IV INFUSION 1 HR: CPT

## 2025-04-07 PROCEDURE — 99214 OFFICE O/P EST MOD 30 MIN: CPT

## 2025-04-07 PROCEDURE — 36591 DRAW BLOOD OFF VENOUS DEVICE: CPT

## 2025-04-07 RX ORDER — DIPHENHYDRAMINE HYDROCHLORIDE 50 MG/ML
25 INJECTION, SOLUTION INTRAMUSCULAR; INTRAVENOUS
Status: CANCELLED
Start: 2025-04-07

## 2025-04-07 RX ORDER — METHYLPREDNISOLONE SODIUM SUCCINATE 40 MG/ML
40 INJECTION INTRAMUSCULAR; INTRAVENOUS
Status: CANCELLED
Start: 2025-04-07

## 2025-04-07 RX ORDER — ALBUTEROL SULFATE 90 UG/1
1-2 INHALANT RESPIRATORY (INHALATION)
Status: CANCELLED
Start: 2025-04-07

## 2025-04-07 RX ORDER — DIPHENHYDRAMINE HYDROCHLORIDE 50 MG/ML
50 INJECTION, SOLUTION INTRAMUSCULAR; INTRAVENOUS
Status: CANCELLED
Start: 2025-04-07

## 2025-04-07 RX ORDER — HEPARIN SODIUM (PORCINE) LOCK FLUSH IV SOLN 100 UNIT/ML 100 UNIT/ML
5 SOLUTION INTRAVENOUS
Status: DISCONTINUED | OUTPATIENT
Start: 2025-04-07 | End: 2025-04-07 | Stop reason: HOSPADM

## 2025-04-07 RX ORDER — DIPHENHYDRAMINE HCL 25 MG
50 CAPSULE ORAL ONCE
Status: CANCELLED
Start: 2025-04-07

## 2025-04-07 RX ORDER — HEPARIN SODIUM (PORCINE) LOCK FLUSH IV SOLN 100 UNIT/ML 100 UNIT/ML
5 SOLUTION INTRAVENOUS
Status: CANCELLED | OUTPATIENT
Start: 2025-04-07

## 2025-04-07 RX ORDER — DIPHENHYDRAMINE HCL 25 MG
50 CAPSULE ORAL ONCE
Status: COMPLETED | OUTPATIENT
Start: 2025-04-07 | End: 2025-04-07

## 2025-04-07 RX ORDER — EPINEPHRINE 1 MG/ML
0.3 INJECTION, SOLUTION INTRAMUSCULAR; SUBCUTANEOUS EVERY 5 MIN PRN
Status: CANCELLED | OUTPATIENT
Start: 2025-04-07

## 2025-04-07 RX ORDER — MEPERIDINE HYDROCHLORIDE 25 MG/ML
25 INJECTION INTRAMUSCULAR; INTRAVENOUS; SUBCUTANEOUS
Status: CANCELLED | OUTPATIENT
Start: 2025-04-07

## 2025-04-07 RX ORDER — LORAZEPAM 2 MG/ML
0.5 INJECTION INTRAMUSCULAR EVERY 4 HOURS PRN
Status: CANCELLED | OUTPATIENT
Start: 2025-04-07

## 2025-04-07 RX ORDER — HEPARIN SODIUM,PORCINE 10 UNIT/ML
5-20 VIAL (ML) INTRAVENOUS DAILY PRN
Status: CANCELLED | OUTPATIENT
Start: 2025-04-07

## 2025-04-07 RX ORDER — ALBUTEROL SULFATE 0.83 MG/ML
2.5 SOLUTION RESPIRATORY (INHALATION)
Status: CANCELLED | OUTPATIENT
Start: 2025-04-07

## 2025-04-07 RX ADMIN — PACLITAXEL 161 MG: 6 INJECTION, SOLUTION INTRAVENOUS at 10:04

## 2025-04-07 RX ADMIN — DEXAMETHASONE SODIUM PHOSPHATE: 10 INJECTION, SOLUTION INTRAMUSCULAR; INTRAVENOUS at 09:30

## 2025-04-07 RX ADMIN — SODIUM CHLORIDE 250 ML: 0.9 INJECTION, SOLUTION INTRAVENOUS at 09:25

## 2025-04-07 RX ADMIN — DIPHENHYDRAMINE HYDROCHLORIDE 50 MG: 25 CAPSULE ORAL at 09:24

## 2025-04-07 RX ADMIN — FAMOTIDINE 20 MG: 10 INJECTION, SOLUTION INTRAVENOUS at 09:26

## 2025-04-07 RX ADMIN — HEPARIN 5 ML: 100 SYRINGE at 11:44

## 2025-04-07 ASSESSMENT — PAIN SCALES - GENERAL: PAINLEVEL_OUTOF10: MILD PAIN (2)

## 2025-04-07 NOTE — PROGRESS NOTES
Oncology/Hematology Visit Note  4/7/2025     Reason for Visit: Follow-up- Left Breast Cancer  - ER positive, DE positive, HER2 negative, MammaPrint high risk, Ki-67 = 17%     Primary Oncologist: Dr. Tariq    Oncology HPI:   - 1/22/2025: Mammogram at [facility] obtained for increased firmness and swelling of left breast over course of 1 month.  Imaging showed at least a 4.1 cm asymmetry in the left upper outer breast and slight thickening of the left breast.  Ultrasound of left breast showed vague, irregular hypoechoic mass at 1:00, 9 cm from nipple measuring at least 4.3 x 3.8 x 3.1 cm.  No suspicious -.  1/31/2025: Ultrasound-guided left breast core biopsy showed grade 2 invasive lobular carcinoma, angiolymphatic invasion absent, associated LCIS present, ER moderate positive at 95%, DE strongly positive at 98%, HER2 negative with IHC = 1+, Ki-67 = 17%.  MammaPrint high risk-1, luminal B, 6% absolute chemo benefit.  -.  2/20/2025: Breast MRI at [facility] showed abnormal non-mass type enhancement measuring 12 x 6 x 11 cm occupying most of anterior mid and lateral LEFT breast with tethering of underlying pectoralis major muscle but no suspicious enhancement within the muscle itself; additional mass at 3:00 posteriorly on the left appears to represent an intramammary lymph node; mildly enlarged lymph node in the low LEFT axilla was biopsied on 3/11/2025; Sscattered foci of enhancement within the RIGHT breast with the most discrete of these is a mass measuring up to 1.0 cm in maximal dimension in the central core, anterior to middle depth.  - 2/25/2025: CT C/A/P and NM bone scan: reportedly negative for metastatic disease. 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  - 3/11/2025: Left axillary lymph node biopsy positive for metastatic lobular carcinoma of breast, ER positive at 90%, DE positive at 90%, HER-2 negative with IHC = 1+.  - 3/18/2025: Right breast biopsy at 8:00 negative for malignancy.  - 3/21/2025: Cerianna PET scan  "showed Left breast mass 9.6 cm with mild PET Cerianna uptake consistent with known ER+ positive breast cancer; 12 mm left axillary lymph node with mild PET Cerianna uptake consistent with metastasis. Several additional smaller left axillary and subpectoral lymph nodes do not take up PET Cerianna and are indeterminate.  - 3/31/25: started weekly Taxol    Current Treatment: weekly Taxol    Interval history:   Florinda presents to clinic for D1C2 Taxol, she is accompanied by her father. She states that her port is , making it difficult to rest.  She also note that she has a \"scratch in her throat\" when lying flat that she attributes to her port. Unsure if she is having reflux symptoms, had mild nausea after cycle 1.     Denies neuropathy.     REVIEW OF SYSTEMS:   14 point ROS was reviewed and is negative other than as noted above in HPI.     Current Outpatient Medications   Medication Sig Dispense Refill    levothyroxine (SYNTHROID/LEVOTHROID) 88 MCG tablet Take 88 mcg by mouth daily      lidocaine-prilocaine (EMLA) 2.5-2.5 % external cream Apply to port site at least 15-30 min prior to port access 30 g 3    prochlorperazine (COMPAZINE) 10 MG tablet Take 1 tablet (10 mg) by mouth every 6 hours as needed for nausea or vomiting. 30 tablet 2          Allergies   Allergen Reactions    Animal Dander Cough, Difficulty breathing and Itching    Cat Dander Cough, Difficulty breathing and Itching    Latex     Morphine Nausea and Vomiting    Other (Do Not Use) Nausea and Vomiting     *Darvocet A500         Exam:  Blood pressure 120/73, pulse 80, temperature 98.3  F (36.8  C), temperature source Oral, resp. rate 16, weight 86 kg (189 lb 9.6 oz), last menstrual period 02/13/2025, SpO2 99%, unknown if currently breastfeeding.  Wt Readings from Last 4 Encounters:   04/07/25 86 kg (189 lb 9.6 oz)   03/31/25 84.7 kg (186 lb 12.8 oz)   03/28/25 84.7 kg (186 lb 11.2 oz)   03/26/25 84.4 kg (186 lb)       Constitutional: Pleasant " female in no acute distress.  Eyes: No scleral icterus. No conjunctival erythema or discharge  Cardiovascular: Regular rate and rhythm. No murmurs, gallops, or rubs. No peripheral edema.  Respiratory: Clear to auscultation bilaterally. No wheezes or crackles.  Left Breast: large palpable mass with notable skin irregularities  Right Breast: surgical tape from biopsy site, mild bruising; no palpable masses  Gastrointestinal: Bowel sounds present. Abdomen soft, non-tender. No palpable hepatosplenomegaly or masses.   MSK: moving all extremities freely  Neuro: Cranial nerves II-XII intact  Gait:walking unassisted  Skin: No visible lesions, bruising or rashes  Psych: appropriate mood and affect     Labs:    Latest Reference Range & Units 04/06/25 11:40   WBC 4.0 - 11.0 10e3/uL 7.8   Hemoglobin 11.7 - 15.7 g/dL 13.0   Hematocrit 35.0 - 47.0 % 37.9   Platelet Count 150 - 450 10e3/uL 306   RBC Count 3.80 - 5.20 10e6/uL 4.24   MCV 78 - 100 fL 89   MCH 26.5 - 33.0 pg 30.7   MCHC 31.5 - 36.5 g/dL 34.3   RDW 10.0 - 15.0 % 12.8   % Neutrophils % 71   % Lymphocytes % 23   % Monocytes % 4   % Eosinophils % 1   % Basophils % 1   % Immature Granulocytes % 1   Absolute Basophils 0.0 - 0.2 10e3/uL 0.0   Absolute Eosinophils 0.0 - 0.7 10e3/uL 0.1   Absolute Immature Granulocytes <=0.4 10e3/uL 0.1   Absolute Lymphocytes 0.8 - 5.3 10e3/uL 1.8   Absolute Monocytes 0.0 - 1.3 10e3/uL 0.3   Absolute Neutrophils 1.6 - 8.3 10e3/uL 5.5   Absolute NRBCs 10e3/uL 0.0   NRBCs per 100 WBC <1 /100 0       Imaging: reviewed    Impression/plan: Florinda is a 44 year old female diagnosed with Left Breast Cancer    Left Breast Cancer  -  Clinical prognostic stage IIIA, cT3-N1-MX, grade 2 invasive lobular carcinoma of the left upper outer breast, ER positive, OH positive, HER2 negative, MammaPrint high risk, Ki-67 = 17%   - 3/21/2025 Cerianna PET scan with uptake within the known large left breast invasive lobular carcinoma, left axillary node, but no other  site and no distant metastatic disease.  --Given the very large size of this tumor, she would benefit from attempted downstaging with neoadjuvant chemotherapy.  - Treatment Plan:  weekly paclitaxel for 12 weeks with reimaging thereafter and likely additional chemotherapy with dose dense Adriamycin and Cytoxan given high risk Mammaprint result. She would then proceed to mastectomy under care of Dr. Nancy Shaikh.  - She will need adjuvant radiation therapy as well as adjuvant endocrine therapy with ovarian suppression therapy with Zoladex injections every 4 weeks in conjunction with an aromatase inhibitor such as anastrozole.   --3/11/2025 Left axillary node biopsy positive for metastatic lobular carcinoma.  --3/18/2025 Right breast biopsy was negative for malignancy.  --3/10/2025 CA 15-3 was elevated at 990.  Discussed that although NCCN guidelines do not recommend following tumor markers for routine surveillance, following her CA 15-3 will be helpful in her specific situation for treatment decision making due to the concern for additional micrometastatic disease.  -  Will need interval breast MRI after completing the paclitaxel portion of her chemo  - 4/7/2025  Labs reviewed ok to proceed with Taxol , Draw Ca-15-3   - Schedule with Reena next week infusion     GI  - possible reflux, advised starting Pepcid  - advised salt/soda rinses for oral hygiene  - will monitor     Port Discomfort  - will monitor for persistent symptoms,  - consider IR if remains uncomfortable     Fertility   -She has one child, age 5 and will not be planning any more children.    Hypothyroidism  - continue levothyroxine    Reena Dow PA-C  The longitudinal plan of care for the diagnosis(es)/condition(s) as documented were addressed during this visit. Due to the added complexity in care, I will continue to support Florinda in the subsequent management and with ongoing continuity of care.

## 2025-04-07 NOTE — LETTER
4/7/2025      Florinda Keller  [address] [place] [address]      Dear Colleague,    Thank you for referring your patient, Florinda Keller, to the [facility]. Please see a copy of my visit note below.    Oncology/Hematology Visit Note  4/7/2025     Reason for Visit: Follow-up- Left Breast Cancer  - ER positive, NH positive, HER2 negative, MammaPrint high risk, Ki-67 = 17%     Primary Oncologist: Dr. Tariq    Oncology HPI:   - 1/22/2025: Mammogram at [facility] obtained for increased firmness and swelling of left breast over course of 1 month.  Imaging showed at least a 4.1 cm asymmetry in the left upper outer breast and slight thickening of the left breast.  Ultrasound of left breast showed vague, irregular hypoechoic mass at 1:00, 9 cm from nipple measuring at least 4.3 x 3.8 x 3.1 cm.  No suspicious -.  1/31/2025: Ultrasound-guided left breast core biopsy showed grade 2 invasive lobular carcinoma, angiolymphatic invasion absent, associated LCIS present, ER moderate positive at 95%, NH strongly positive at 98%, HER2 negative with IHC = 1+, Ki-67 = 17%.  MammaPrint high risk-1, luminal B, 6% absolute chemo benefit.  -.  2/20/2025: Breast MRI at [facility] showed abnormal non-mass type enhancement measuring 12 x 6 x 11 cm occupying most of anterior mid and lateral LEFT breast with tethering of underlying pectoralis major muscle but no suspicious enhancement within the muscle itself; additional mass at 3:00 posteriorly on the left appears to represent an intramammary lymph node; mildly enlarged lymph node in the low LEFT axilla was biopsied on 3/11/2025; Sscattered foci of enhancement within the RIGHT breast with the most discrete of these is a mass measuring up to 1.0 cm in maximal dimension in the central core, anterior to middle depth.  - 2/25/2025: CT C/A/P and NM bone scan: reportedly negative for metastatic disease. 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  - 3/11/2025: Left axillary  "lymph node biopsy positive for metastatic lobular carcinoma of breast, ER positive at 90%, DC positive at 90%, HER-2 negative with IHC = 1+.  - 3/18/2025: Right breast biopsy at 8:00 negative for malignancy.  - 3/21/2025: Cerianna PET scan showed Left breast mass 9.6 cm with mild PET Cerianna uptake consistent with known ER+ positive breast cancer; 12 mm left axillary lymph node with mild PET Cerianna uptake consistent with metastasis. Several additional smaller left axillary and subpectoral lymph nodes do not take up PET Cerianna and are indeterminate.  - 3/31/25: started weekly Taxol    Current Treatment: weekly Taxol    Interval history:   Florinda presents to clinic for D1C2 Taxol, she is accompanied by her father. She states that her port is , making it difficult to rest.  She also note that she has a \"scratch in her throat\" when lying flat that she attributes to her port. Unsure if she is having reflux symptoms, had mild nausea after cycle 1.     Denies neuropathy.     REVIEW OF SYSTEMS:   14 point ROS was reviewed and is negative other than as noted above in HPI.     Current Outpatient Medications   Medication Sig Dispense Refill     levothyroxine (SYNTHROID/LEVOTHROID) 88 MCG tablet Take 88 mcg by mouth daily       lidocaine-prilocaine (EMLA) 2.5-2.5 % external cream Apply to port site at least 15-30 min prior to port access 30 g 3     prochlorperazine (COMPAZINE) 10 MG tablet Take 1 tablet (10 mg) by mouth every 6 hours as needed for nausea or vomiting. 30 tablet 2          Allergies   Allergen Reactions     Animal Dander Cough, Difficulty breathing and Itching     Cat Dander Cough, Difficulty breathing and Itching     Latex      Morphine Nausea and Vomiting     Other (Do Not Use) Nausea and Vomiting     *Darvocet A500         Exam:  Blood pressure 120/73, pulse 80, temperature 98.3  F (36.8  C), temperature source Oral, resp. rate 16, weight 86 kg (189 lb 9.6 oz), last menstrual period 02/13/2025, " SpO2 99%, unknown if currently breastfeeding.  Wt Readings from Last 4 Encounters:   04/07/25 86 kg (189 lb 9.6 oz)   03/31/25 84.7 kg (186 lb 12.8 oz)   03/28/25 84.7 kg (186 lb 11.2 oz)   03/26/25 84.4 kg (186 lb)       Constitutional: Pleasant female in no acute distress.  Eyes: No scleral icterus. No conjunctival erythema or discharge  Cardiovascular: Regular rate and rhythm. No murmurs, gallops, or rubs. No peripheral edema.  Respiratory: Clear to auscultation bilaterally. No wheezes or crackles.  Left Breast: large palpable mass with notable skin irregularities  Right Breast: surgical tape from biopsy site, mild bruising; no palpable masses  Gastrointestinal: Bowel sounds present. Abdomen soft, non-tender. No palpable hepatosplenomegaly or masses.   MSK: moving all extremities freely  Neuro: Cranial nerves II-XII intact  Gait:walking unassisted  Skin: No visible lesions, bruising or rashes  Psych: appropriate mood and affect     Labs:    Latest Reference Range & Units 04/06/25 11:40   WBC 4.0 - 11.0 10e3/uL 7.8   Hemoglobin 11.7 - 15.7 g/dL 13.0   Hematocrit 35.0 - 47.0 % 37.9   Platelet Count 150 - 450 10e3/uL 306   RBC Count 3.80 - 5.20 10e6/uL 4.24   MCV 78 - 100 fL 89   MCH 26.5 - 33.0 pg 30.7   MCHC 31.5 - 36.5 g/dL 34.3   RDW 10.0 - 15.0 % 12.8   % Neutrophils % 71   % Lymphocytes % 23   % Monocytes % 4   % Eosinophils % 1   % Basophils % 1   % Immature Granulocytes % 1   Absolute Basophils 0.0 - 0.2 10e3/uL 0.0   Absolute Eosinophils 0.0 - 0.7 10e3/uL 0.1   Absolute Immature Granulocytes <=0.4 10e3/uL 0.1   Absolute Lymphocytes 0.8 - 5.3 10e3/uL 1.8   Absolute Monocytes 0.0 - 1.3 10e3/uL 0.3   Absolute Neutrophils 1.6 - 8.3 10e3/uL 5.5   Absolute NRBCs 10e3/uL 0.0   NRBCs per 100 WBC <1 /100 0       Imaging: reviewed    Impression/plan: Florinda is a 44 year old female diagnosed with Left Breast Cancer    Left Breast Cancer  -  Clinical prognostic stage IIIA, cT3-N1-MX, grade 2 invasive lobular carcinoma  of the left upper outer breast, ER positive, HI positive, HER2 negative, MammaPrint high risk, Ki-67 = 17%   - 3/21/2025 Cer[facility]na PET scan with uptake within the known large left breast invasive lobular carcinoma, left axillary node, but no other site and no distant metastatic disease.  --Given the very large size of this tumor, she would benefit from attempted downstaging with neoadjuvant chemotherapy.  - Treatment Plan:  weekly paclitaxel for 12 weeks with reimaging thereafter and likely additional chemotherapy with dose dense Adriamycin and Cytoxan given high risk Mammaprint result. She would then proceed to mastectomy under care of Dr. Nancy Shaikh.  - She will need adjuvant radiation therapy as well as adjuvant endocrine therapy with ovarian suppression therapy with Zoladex injections every 4 weeks in conjunction with an aromatase inhibitor such as anastrozole.   --3/11/2025 Left axillary node biopsy positive for metastatic lobular carcinoma.  --3/18/2025 Right breast biopsy was negative for malignancy.  --3/10/2025 CA 15-3 was elevated at 990.  Discussed that although NCCN guidelines do not recommend following tumor markers for routine surveillance, following her CA 15-3 will be helpful in her specific situation for treatment decision making due to the concern for additional micrometastatic disease.  -  Will need interval breast MRI after completing the paclitaxel portion of her chemo  - 4/7/2025  Labs reviewed ok to proceed with Taxol , Draw Ca-15-3   - Schedule with Reena next week infusion     GI  - possible reflux, advised starting Pepcid  - advised salt/soda rinses for oral hygiene  - will monitor     Port Discomfort  - will monitor for persistent symptoms,  - consider IR if remains uncomfortable     Fertility   -She has one child, age 5 and will not be planning any more children.    Hypothyroidism  - continue levothyroxine    Reena Dow PA-C  The longitudinal plan of care for the  diagnosis(es)/condition(s) as documented were addressed during this visit. Due to the added complexity in care, I will continue to support Florinda in the subsequent management and with ongoing continuity of care.        Again, thank you for allowing me to participate in the care of your patient.        Sincerely,        Reena Dow PA-C    Electronically signed

## 2025-04-07 NOTE — PROGRESS NOTES
"Infusion Nursing Note:  Florinda Keller presents today for C2D1 Taxol.    Patient seen by provider today: Yes: Reena Dow PA-C   present during visit today: Not Applicable.    Note: pt with prior reaction to Taxol. Will rechallenge today.   Taxol administered at the following rate:  25ml/hr x 10\"  50ml/hr x 10\"  100ml/hr x 10\"  150ml/hr x 10\"  200ml/hr x 10\"  250ml/hr x 10\"  301ml/hr remainder.  .      Intravenous Access:  Implanted Port.    Treatment Conditions:  Lab Results   Component Value Date    HGB 13.0 04/06/2025    WBC 7.8 04/06/2025    ANEUTAUTO 5.5 04/06/2025     04/06/2025        Results reviewed, labs MET treatment parameters, ok to proceed with treatment.      Post Infusion Assessment:  Patient tolerated infusion without incident.  Blood return noted pre and post infusion.  Site patent and intact, free from redness, edema or discomfort.  No evidence of extravasations.  Access discontinued per protocol.       Discharge Plan:   Discharge instructions reviewed with: Patient and Family.  Patient and/or family verbalized understanding of discharge instructions and all questions answered.  Patient discharged in stable condition accompanied by: father.  Departure Mode: Ambulatory.      Kitty Webster RN    "

## 2025-04-07 NOTE — PATIENT INSTRUCTIONS
Salt and Soda rinses:    1 tsp salt, 1 tsp baking soda in 1 c of water.    Swish and spit at least 4 times daily to prevent/minimize mouth sores.       [employer]

## 2025-04-09 RX ORDER — EPINEPHRINE 1 MG/ML
0.3 INJECTION, SOLUTION, CONCENTRATE INTRAVENOUS EVERY 5 MIN PRN
OUTPATIENT
Start: 2025-04-14

## 2025-04-09 RX ORDER — HEPARIN SODIUM,PORCINE 10 UNIT/ML
5-20 VIAL (ML) INTRAVENOUS DAILY PRN
OUTPATIENT
Start: 2025-04-14

## 2025-04-09 RX ORDER — DIPHENHYDRAMINE HYDROCHLORIDE 50 MG/ML
50 INJECTION, SOLUTION INTRAMUSCULAR; INTRAVENOUS
Start: 2025-04-14

## 2025-04-09 RX ORDER — ALBUTEROL SULFATE 0.83 MG/ML
2.5 SOLUTION RESPIRATORY (INHALATION)
OUTPATIENT
Start: 2025-04-14

## 2025-04-09 RX ORDER — DIPHENHYDRAMINE HCL 25 MG
50 CAPSULE ORAL
Status: CANCELLED
Start: 2025-04-14

## 2025-04-09 RX ORDER — ONDANSETRON 2 MG/ML
8 INJECTION INTRAMUSCULAR; INTRAVENOUS ONCE
OUTPATIENT
Start: 2025-04-14

## 2025-04-09 RX ORDER — DIPHENHYDRAMINE HYDROCHLORIDE 50 MG/ML
25 INJECTION, SOLUTION INTRAMUSCULAR; INTRAVENOUS
Start: 2025-04-14

## 2025-04-09 RX ORDER — HEPARIN SODIUM (PORCINE) LOCK FLUSH IV SOLN 100 UNIT/ML 100 UNIT/ML
5 SOLUTION INTRAVENOUS
OUTPATIENT
Start: 2025-04-14

## 2025-04-09 RX ORDER — DIPHENHYDRAMINE HCL 25 MG
50 CAPSULE ORAL
Start: 2025-04-14

## 2025-04-09 RX ORDER — LORAZEPAM 2 MG/ML
0.5 INJECTION INTRAMUSCULAR EVERY 4 HOURS PRN
OUTPATIENT
Start: 2025-04-14

## 2025-04-09 RX ORDER — ALBUTEROL SULFATE 90 UG/1
1-2 INHALANT RESPIRATORY (INHALATION)
Start: 2025-04-14

## 2025-04-09 RX ORDER — MEPERIDINE HYDROCHLORIDE 25 MG/ML
25 INJECTION INTRAMUSCULAR; INTRAVENOUS; SUBCUTANEOUS
OUTPATIENT
Start: 2025-04-14

## 2025-04-13 ENCOUNTER — INFUSION THERAPY VISIT (OUTPATIENT)
Dept: INFUSION THERAPY | Facility: CLINIC | Age: 45
End: 2025-04-13
Attending: INTERNAL MEDICINE
Payer: COMMERCIAL

## 2025-04-13 DIAGNOSIS — C50.812 MALIGNANT NEOPLASM OF OVERLAPPING SITES OF LEFT BREAST IN FEMALE, ESTROGEN RECEPTOR POSITIVE (H): Primary | ICD-10-CM

## 2025-04-13 DIAGNOSIS — Z17.0 MALIGNANT NEOPLASM OF OVERLAPPING SITES OF LEFT BREAST IN FEMALE, ESTROGEN RECEPTOR POSITIVE (H): Primary | ICD-10-CM

## 2025-04-13 LAB
BASOPHILS # BLD AUTO: 0 10E3/UL (ref 0–0.2)
BASOPHILS NFR BLD AUTO: 1 %
EOSINOPHIL # BLD AUTO: 0.1 10E3/UL (ref 0–0.7)
EOSINOPHIL NFR BLD AUTO: 1 %
ERYTHROCYTE [DISTWIDTH] IN BLOOD BY AUTOMATED COUNT: 12.6 % (ref 10–15)
HCT VFR BLD AUTO: 36.8 % (ref 35–47)
HGB BLD-MCNC: 12.3 G/DL (ref 11.7–15.7)
IMM GRANULOCYTES # BLD: 0 10E3/UL
IMM GRANULOCYTES NFR BLD: 0 %
LYMPHOCYTES # BLD AUTO: 2.4 10E3/UL (ref 0.8–5.3)
LYMPHOCYTES NFR BLD AUTO: 41 %
MCH RBC QN AUTO: 30.2 PG (ref 26.5–33)
MCHC RBC AUTO-ENTMCNC: 33.4 G/DL (ref 31.5–36.5)
MCV RBC AUTO: 90 FL (ref 78–100)
MONOCYTES # BLD AUTO: 0.2 10E3/UL (ref 0–1.3)
MONOCYTES NFR BLD AUTO: 4 %
NEUTROPHILS # BLD AUTO: 3 10E3/UL (ref 1.6–8.3)
NEUTROPHILS NFR BLD AUTO: 53 %
NRBC # BLD AUTO: 0 10E3/UL
NRBC BLD AUTO-RTO: 0 /100
PLATELET # BLD AUTO: 293 10E3/UL (ref 150–450)
RBC # BLD AUTO: 4.07 10E6/UL (ref 3.8–5.2)
WBC # BLD AUTO: 5.7 10E3/UL (ref 4–11)

## 2025-04-13 PROCEDURE — 85004 AUTOMATED DIFF WBC COUNT: CPT | Performed by: INTERNAL MEDICINE

## 2025-04-13 PROCEDURE — 36415 COLL VENOUS BLD VENIPUNCTURE: CPT | Performed by: INTERNAL MEDICINE

## 2025-04-13 PROCEDURE — 85041 AUTOMATED RBC COUNT: CPT | Performed by: INTERNAL MEDICINE

## 2025-04-13 NOTE — PROGRESS NOTES
Nursing Note:  Florinda ARELLANO Krishna presents today for peripheral labs.    Patient seen by provider today: No   present during visit today: Not Applicable.    Note: N/A.    Intravenous Access:  Lab draw site LAC, Needle type Butterfly, Gauge 23.    Discharge Plan:   Patient was discharged to home.     Marlys Kwok RN

## 2025-04-14 ENCOUNTER — INFUSION THERAPY VISIT (OUTPATIENT)
Dept: INFUSION THERAPY | Facility: CLINIC | Age: 45
End: 2025-04-14
Attending: INTERNAL MEDICINE
Payer: COMMERCIAL

## 2025-04-14 VITALS
BODY MASS INDEX: 28.8 KG/M2 | TEMPERATURE: 98.6 F | HEART RATE: 63 BPM | SYSTOLIC BLOOD PRESSURE: 118 MMHG | OXYGEN SATURATION: 95 % | DIASTOLIC BLOOD PRESSURE: 67 MMHG | RESPIRATION RATE: 16 BRPM | WEIGHT: 189.4 LBS

## 2025-04-14 DIAGNOSIS — Z17.0 MALIGNANT NEOPLASM OF OVERLAPPING SITES OF LEFT BREAST IN FEMALE, ESTROGEN RECEPTOR POSITIVE (H): Primary | ICD-10-CM

## 2025-04-14 DIAGNOSIS — C50.812 MALIGNANT NEOPLASM OF OVERLAPPING SITES OF LEFT BREAST IN FEMALE, ESTROGEN RECEPTOR POSITIVE (H): Primary | ICD-10-CM

## 2025-04-14 PROCEDURE — 250N000011 HC RX IP 250 OP 636: Performed by: INTERNAL MEDICINE

## 2025-04-14 PROCEDURE — 96375 TX/PRO/DX INJ NEW DRUG ADDON: CPT

## 2025-04-14 PROCEDURE — 258N000003 HC RX IP 258 OP 636: Performed by: INTERNAL MEDICINE

## 2025-04-14 PROCEDURE — 96415 CHEMO IV INFUSION ADDL HR: CPT

## 2025-04-14 PROCEDURE — 96367 TX/PROPH/DG ADDL SEQ IV INF: CPT

## 2025-04-14 PROCEDURE — 250N000013 HC RX MED GY IP 250 OP 250 PS 637: Performed by: INTERNAL MEDICINE

## 2025-04-14 PROCEDURE — 96413 CHEMO IV INFUSION 1 HR: CPT

## 2025-04-14 RX ORDER — HEPARIN SODIUM (PORCINE) LOCK FLUSH IV SOLN 100 UNIT/ML 100 UNIT/ML
5 SOLUTION INTRAVENOUS
Status: DISCONTINUED | OUTPATIENT
Start: 2025-04-14 | End: 2025-04-14 | Stop reason: HOSPADM

## 2025-04-14 RX ORDER — DIPHENHYDRAMINE HCL 25 MG
50 CAPSULE ORAL
Status: DISCONTINUED | OUTPATIENT
Start: 2025-04-14 | End: 2025-04-14 | Stop reason: HOSPADM

## 2025-04-14 RX ORDER — ONDANSETRON 2 MG/ML
8 INJECTION INTRAMUSCULAR; INTRAVENOUS ONCE
Status: COMPLETED | OUTPATIENT
Start: 2025-04-14 | End: 2025-04-14

## 2025-04-14 RX ADMIN — FAMOTIDINE 20 MG: 10 INJECTION, SOLUTION INTRAVENOUS at 09:52

## 2025-04-14 RX ADMIN — ONDANSETRON 8 MG: 2 INJECTION INTRAMUSCULAR; INTRAVENOUS at 09:53

## 2025-04-14 RX ADMIN — PACLITAXEL 161 MG: 6 INJECTION, SOLUTION INTRAVENOUS at 10:21

## 2025-04-14 RX ADMIN — Medication 5 ML: at 11:59

## 2025-04-14 RX ADMIN — DIPHENHYDRAMINE HYDROCHLORIDE 50 MG: 25 CAPSULE ORAL at 09:39

## 2025-04-14 RX ADMIN — SODIUM CHLORIDE 250 ML: 0.9 INJECTION, SOLUTION INTRAVENOUS at 09:41

## 2025-04-14 RX ADMIN — DEXAMETHASONE SODIUM PHOSPHATE 20 MG: 10 INJECTION, SOLUTION INTRAMUSCULAR; INTRAVENOUS at 09:53

## 2025-04-14 NOTE — PROGRESS NOTES
Infusion Nursing Note:  Florinda Keller presents today for C3D1 Taxol.    Patient seen by provider today: No   present during visit today: Not Applicable.    Note: Previous Taxol reaction. Taxol administered at the following rate:  25ml/hr x 10 min  50ml/hr x 10 min  100ml/hr x 10 min  150ml/hr x 10 min  200ml/hr x 10 min  250ml/hr x 10 min  301ml/hr remainder      Intravenous Access:  Implanted Port.    Treatment Conditions:  Lab Results   Component Value Date    HGB 12.3 04/13/2025    WBC 5.7 04/13/2025    ANEUTAUTO 3.0 04/13/2025     04/13/2025        Results reviewed, labs MET treatment parameters, ok to proceed with treatment.      Post Infusion Assessment:  Patient tolerated infusion without incident.  Blood return noted pre and post infusion.  Site patent and intact, free from redness, edema or discomfort.  No evidence of extravasations.  Access discontinued per protocol.       Discharge Plan:   Discharge instructions reviewed with: Patient and Family.  Patient and/or family verbalized understanding of discharge instructions and all questions answered.  AVS to patient via [id number].  Patient will return 4/20/25 for labs and 4/21/25 for next provider appointment/treatment.   Patient discharged in stable condition accompanied by: father.  Departure Mode: Ambulatory.      Dayana Rivera RN

## 2025-04-18 NOTE — PROGRESS NOTES
[facility]    Hematology/Oncology Established Patient Note      Today's Date: 4/24/2025    Reason for visit: Left breast cancer.    HISTORY OF PRESENT ILLNESS: Florinda Keller is a 44 year old female who presents with the following oncologic history:  1.  1/22/2025: Mammogram at [facility] obtained for increased firmness and swelling of left breast over course of 1 month.  Imaging showed at least a 4.1 cm asymmetry in the left upper outer breast and slight thickening of the left breast.  Ultrasound of left breast showed vague, irregular hypoechoic mass at 1:00, 9 cm from nipple measuring at least 4.3 x 3.8 x 3.1 cm.  No suspicious lymph nodes within left axilla.  2.  1/31/2025: Ultrasound-guided left breast core biopsy showed grade 2 invasive lobular carcinoma, angiolymphatic invasion absent, associated LCIS present, ER moderate positive at 95%, ME strongly positive at 98%, HER2 negative with IHC = 1+, Ki-67 = 17%.  MammaPrint high risk-1, luminal B, 6% absolute chemo benefit.  3.  2/20/2025: Breast MRI at [facility] showed abnormal non-mass type enhancement measuring 12 x 6 x 11 cm occupying most of anterior mid and lateral LEFT breast with tethering of underlying pectoralis major muscle but no suspicious enhancement within the  muscle itself; additional mass at 3:00 posteriorly on the left appears to represent an intramammary lymph node; mildly enlarged lymph node in the low LEFT axilla was biopsied on 3/11/2025; Sscattered foci of enhancement within the RIGHT breast with the most  discrete of these is a mass measuring up to 1.0 cm in maximal dimension in the central core, anterior to middle depth.  4. 2/25/2025: CT C/A/P and NM bone scan: reportedly negative for metastatic disease. 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  5. 3/11/2025: Left axillary lymph node biopsy positive for metastatic lobular carcinoma of breast, ER positive at 90%, ME positive at 90%, HER-2 negative with IHC = 1+.  6.  3/18/2025: Right breast biopsy at 8:00 negative for malignancy.  7. 3/21/2025: Cerianna PET scan showed Left breast mass 9.6 cm with mild PET Cerianna uptake consistent with known ER+ positive breast cancer; 12 mm left axillary lymph node with mild PET Cerianna uptake consistent with metastasis. Several additional smaller left axillary and subpectoral lymph nodes do not take up PET Cerianna and are indeterminate.  8. 3/31/2025: Started neoadjuvant paclitaxel weekly.    INTERVAL HISTORY:  Florinda reports some dullness of fingertips. Denies tingling.    REVIEW OF SYSTEMS:   14 point ROS was reviewed and is negative other than as noted above in HPI.       HOME MEDICATIONS:  Current Outpatient Medications   Medication Sig Dispense Refill    levothyroxine (SYNTHROID/LEVOTHROID) 88 MCG tablet Take 88 mcg by mouth daily      lidocaine-prilocaine (EMLA) 2.5-2.5 % external cream Apply to port site at least 15-30 min prior to port access 30 g 3    prochlorperazine (COMPAZINE) 10 MG tablet Take 1 tablet (10 mg) by mouth every 6 hours as needed for nausea or vomiting. 30 tablet 2         ALLERGIES:  Allergies   Allergen Reactions    Animal Dander Cough, Difficulty breathing and Itching    Cat Dander Cough, Difficulty breathing and Itching    Latex     Morphine Nausea and Vomiting    Other (Do Not Use) Nausea and Vomiting     *Darvocet A500         PAST MEDICAL HISTORY:  Past Medical History:   Diagnosis Date    Breast mass, left     Hx of anxiety     Hx of ovarian cyst     Hx of panic disorder     Hypothyroidism     Invasive ductal carcinoma of breast (H)     Neurological disorder      Gynecologic history:  Age of menarche at 13, , age of first pregnancy at 41, breast-fed her child.  Used OCPs for 26 years.  No HRT.    PAST SURGICAL HISTORY:  Past Surgical History:   Procedure Laterality Date    INSERT PORT VASCULAR ACCESS Right 3/28/2025    Procedure: INSERTION, VASCULAR ACCESS PORT;  Surgeon: Malik Maynard MD;   Location: [facility] OR    SKIN BIOPSY      SOFT TISSUE SURGERY Left     hand cyst    THYROIDECTOMY, PARTIAL      WISDOM TOOTH EXTRACTION           SOCIAL HISTORY:  Social History     Socioeconomic History    Marital status:      Spouse name: Not on file    Number of children: Not on file    Years of education: Not on file    Highest education level: Not on file   Occupational History    Not on file   Tobacco Use    Smoking status: Never    Smokeless tobacco: Never   Substance and Sexual Activity    Alcohol use: Not on file    Drug use: Never    Sexual activity: Yes     Partners: Male     Birth control/protection: None   Other Topics Concern    Not on file   Social History Narrative    Not on file     Social Drivers of Health     Financial Resource Strain: Not on file   Food Insecurity: Not on file   Transportation Needs: Not on file   Physical Activity: Not on file   Stress: Not on file   Social Connections: Not on file   Interpersonal Safety: Low Risk  (3/28/2025)    Interpersonal Safety     Do you feel physically and emotionally safe where you currently live?: Yes     Within the past 12 months, have you been hit, slapped, kicked or otherwise physically hurt by someone?: No     Within the past 12 months, have you been humiliated or emotionally abused in other ways by your partner or ex-partner?: Not on file   Housing Stability: Not on file   Has 3 year-old child with  Mahogany Neely works for [employer] as a .    FAMILY HISTORY:  Family history significant for mother with breast cancer at age 72.  Paternal grandmother with breast cancer age 50's and ovarian cancer at age 85. Maternal aunt with lung cancer.    PHYSICAL EXAM:  Vital signs:  /66   Pulse 87   Resp 16   Wt 88 kg (194 lb)   LMP 2025 (Approximate)   SpO2 99%   BMI 29.50 kg/m     ECO  GENERAL/CONSTITUTIONAL: No acute distress.  EYES:  No scleral icterus.  ENT/MOUTH: Neck supple.  LYMPH: Palpable left axillary  adenopathy.   BREAST: Left breast higher and smaller than right breast.  Palpable large and firm mass over three quarters of breast with slight softening compared to prior exam; breast is movable from posterior chest wall.  RESPIRATORY: No audible cough or wheezing.  GASTROINTESTINAL: No guarding.  No distention.  MUSCULOSKELETAL: Warm and well-perfused, no cyanosis, clubbing, or edema.  NEUROLOGIC: No focal motor deficits. Alert, oriented, answers questions appropriately.  INTEGUMENTARY: No rashes or jaundice.  GAIT: Steady, does not use assistive device      LABS:  CBC RESULTS:   Recent Labs   Lab Test 04/13/25  1123   WBC 5.7   RBC 4.07   HGB 12.3   HCT 36.8   MCV 90   MCH 30.2   MCHC 33.4   RDW 12.6        Last Comprehensive Metabolic Panel:  Sodium   Date Value Ref Range Status   03/10/2025 137 135 - 145 mmol/L Final     Potassium   Date Value Ref Range Status   03/10/2025 4.3 3.4 - 5.3 mmol/L Final     Chloride   Date Value Ref Range Status   03/10/2025 101 98 - 107 mmol/L Final     Carbon Dioxide (CO2)   Date Value Ref Range Status   03/10/2025 23 22 - 29 mmol/L Final     Anion Gap   Date Value Ref Range Status   03/10/2025 13 7 - 15 mmol/L Final     Glucose   Date Value Ref Range Status   03/10/2025 93 70 - 99 mg/dL Final     Urea Nitrogen   Date Value Ref Range Status   03/10/2025 6.5 6.0 - 20.0 mg/dL Final     Creatinine   Date Value Ref Range Status   03/10/2025 0.65 0.51 - 0.95 mg/dL Final     GFR Estimate   Date Value Ref Range Status   03/10/2025 >90 >60 mL/min/1.73m2 Final     Comment:     eGFR calculated using 2021 CKD-EPI equation.     Calcium   Date Value Ref Range Status   03/10/2025 9.2 8.8 - 10.4 mg/dL Final     Bilirubin Total   Date Value Ref Range Status   03/31/2025 0.8 <=1.2 mg/dL Final     Alkaline Phosphatase   Date Value Ref Range Status   03/31/2025 63 40 - 150 U/L Final     ALT   Date Value Ref Range Status   03/31/2025 18 0 - 50 U/L Final     AST   Date Value Ref Range Status    03/31/2025 23 0 - 45 U/L Final          PATHOLOGY:  Reviewed as per HPI.    IMAGING:  Reviewed as per HPI.    ASSESSMENT/PLAN:  Florinda Keller is a 44 year old female with the following issues:  1.  Clinical prognostic stage IIIA, cT3-N1-MX, grade 2 invasive lobular carcinoma of the left upper outer breast, ER positive, IA positive, HER2 negative, MammaPrint high risk, Ki-67 = 17%  - 3/21/2025 Cerianna PET scan showed uptake within the known large left breast invasive lobular carcinoma, left axillary node, but no other site and no distant metastatic disease.  --Given the very large size of this tumor, she proceeded with attempted downstaging with neoadjuvant chemotherapy.  - Started weekly paclitaxel on 3/31/2025 for planned 12 weeks followed by dose dense Adriamycin and Cytoxan given high risk Mammaprint result. She would then proceed to mastectomy under care of Dr. Nancy Shaikh.  --Will omit the 12-week interval breast MRI as she will need ddAC for her high risk disease.  --Clinically, she has some softening of the primary breast tumor.  -She will need adjuvant radiation therapy as well as adjuvant endocrine therapy with ovarian suppression therapy with Zoladex injections every 4 weeks in conjunction with an aromatase inhibitor such as anastrozole.  --3/10/2025 CA 15-3 was elevated at 990.  Although NCCN guidelines do not recommend following tumor markers for routine surveillance, following her CA 15-3 will be helpful in her specific situation for treatment decision making due to the concern for additional micrometastatic disease.  --Continue with weekly paclitaxel.    2.  Fertility discussion  -She has one child, age 3 and will not be planning any more children.    Racquel Tariq MD  [facility] Hematology/Oncology    Total time spent today: 30 minutes in chart review, patient evaluation, counseling, documentation, test and/or medication/prescription orders, and coordination of care.      The  longitudinal plan of care for the diagnosis(es)/condition(s) as documented were addressed during this visit. Due to the added complexity in care, I will continue to support Florinda in the subsequent management and with ongoing continuity of care.

## 2025-04-20 ENCOUNTER — INFUSION THERAPY VISIT (OUTPATIENT)
Dept: INFUSION THERAPY | Facility: CLINIC | Age: 45
End: 2025-04-20
Attending: INTERNAL MEDICINE
Payer: COMMERCIAL

## 2025-04-20 DIAGNOSIS — Z17.0 MALIGNANT NEOPLASM OF OVERLAPPING SITES OF LEFT BREAST IN FEMALE, ESTROGEN RECEPTOR POSITIVE (H): Primary | ICD-10-CM

## 2025-04-20 DIAGNOSIS — C50.812 MALIGNANT NEOPLASM OF OVERLAPPING SITES OF LEFT BREAST IN FEMALE, ESTROGEN RECEPTOR POSITIVE (H): Primary | ICD-10-CM

## 2025-04-20 LAB
ALBUMIN SERPL BCG-MCNC: 4.3 G/DL (ref 3.5–5.2)
ALP SERPL-CCNC: 60 U/L (ref 40–150)
ALT SERPL W P-5'-P-CCNC: 31 U/L (ref 0–50)
AST SERPL W P-5'-P-CCNC: 19 U/L (ref 0–45)
BASOPHILS # BLD AUTO: 0 10E3/UL (ref 0–0.2)
BASOPHILS NFR BLD AUTO: 1 %
BILIRUB DIRECT SERPL-MCNC: 0.16 MG/DL (ref 0–0.3)
BILIRUB SERPL-MCNC: 0.7 MG/DL
EOSINOPHIL # BLD AUTO: 0.1 10E3/UL (ref 0–0.7)
EOSINOPHIL NFR BLD AUTO: 1 %
ERYTHROCYTE [DISTWIDTH] IN BLOOD BY AUTOMATED COUNT: 12.8 % (ref 10–15)
HCT VFR BLD AUTO: 38.9 % (ref 35–47)
HGB BLD-MCNC: 12.7 G/DL (ref 11.7–15.7)
IMM GRANULOCYTES # BLD: 0 10E3/UL
IMM GRANULOCYTES NFR BLD: 1 %
LYMPHOCYTES # BLD AUTO: 2 10E3/UL (ref 0.8–5.3)
LYMPHOCYTES NFR BLD AUTO: 44 %
MCH RBC QN AUTO: 30.1 PG (ref 26.5–33)
MCHC RBC AUTO-ENTMCNC: 32.6 G/DL (ref 31.5–36.5)
MCV RBC AUTO: 92 FL (ref 78–100)
MONOCYTES # BLD AUTO: 0.2 10E3/UL (ref 0–1.3)
MONOCYTES NFR BLD AUTO: 4 %
NEUTROPHILS # BLD AUTO: 2.2 10E3/UL (ref 1.6–8.3)
NEUTROPHILS NFR BLD AUTO: 49 %
NRBC # BLD AUTO: 0 10E3/UL
NRBC BLD AUTO-RTO: 0 /100
PLATELET # BLD AUTO: 305 10E3/UL (ref 150–450)
PROT SERPL-MCNC: 6.7 G/DL (ref 6.4–8.3)
RBC # BLD AUTO: 4.22 10E6/UL (ref 3.8–5.2)
WBC # BLD AUTO: 4.4 10E3/UL (ref 4–11)

## 2025-04-20 PROCEDURE — 82248 BILIRUBIN DIRECT: CPT | Performed by: INTERNAL MEDICINE

## 2025-04-20 PROCEDURE — 84155 ASSAY OF PROTEIN SERUM: CPT | Performed by: INTERNAL MEDICINE

## 2025-04-20 PROCEDURE — 85025 COMPLETE CBC W/AUTO DIFF WBC: CPT | Performed by: INTERNAL MEDICINE

## 2025-04-20 PROCEDURE — 36415 COLL VENOUS BLD VENIPUNCTURE: CPT | Performed by: INTERNAL MEDICINE

## 2025-04-20 NOTE — PROGRESS NOTES
Nursing Note:  Florinda ARELLANO Krishna presents today for Peripheral labs.    Patient seen by provider today: No   present during visit today: Not Applicable.    Note: N/A.    Intravenous Access:  Lab draw site Right AC, Needle type Butterfly, Gauge 23.  Labs drawn without difficulty.    Discharge Plan:   Patient was discharged.    Klaus Magana RN

## 2025-04-21 ENCOUNTER — INFUSION THERAPY VISIT (OUTPATIENT)
Dept: INFUSION THERAPY | Facility: CLINIC | Age: 45
End: 2025-04-21
Attending: INTERNAL MEDICINE
Payer: COMMERCIAL

## 2025-04-21 ENCOUNTER — ONCOLOGY VISIT (OUTPATIENT)
Dept: ONCOLOGY | Facility: CLINIC | Age: 45
End: 2025-04-21
Attending: INTERNAL MEDICINE
Payer: COMMERCIAL

## 2025-04-21 VITALS
BODY MASS INDEX: 28.64 KG/M2 | RESPIRATION RATE: 16 BRPM | OXYGEN SATURATION: 98 % | TEMPERATURE: 98.5 F | HEART RATE: 74 BPM | HEIGHT: 68 IN | SYSTOLIC BLOOD PRESSURE: 101 MMHG | DIASTOLIC BLOOD PRESSURE: 69 MMHG | WEIGHT: 189 LBS

## 2025-04-21 DIAGNOSIS — Z17.0 MALIGNANT NEOPLASM OF OVERLAPPING SITES OF LEFT BREAST IN FEMALE, ESTROGEN RECEPTOR POSITIVE (H): Primary | ICD-10-CM

## 2025-04-21 DIAGNOSIS — C50.812 MALIGNANT NEOPLASM OF OVERLAPPING SITES OF LEFT BREAST IN FEMALE, ESTROGEN RECEPTOR POSITIVE (H): Primary | ICD-10-CM

## 2025-04-21 PROCEDURE — 99213 OFFICE O/P EST LOW 20 MIN: CPT | Mod: 25

## 2025-04-21 PROCEDURE — 99214 OFFICE O/P EST MOD 30 MIN: CPT

## 2025-04-21 PROCEDURE — 96415 CHEMO IV INFUSION ADDL HR: CPT

## 2025-04-21 PROCEDURE — 250N000011 HC RX IP 250 OP 636

## 2025-04-21 PROCEDURE — 96375 TX/PRO/DX INJ NEW DRUG ADDON: CPT

## 2025-04-21 PROCEDURE — G2211 COMPLEX E/M VISIT ADD ON: HCPCS

## 2025-04-21 PROCEDURE — 96413 CHEMO IV INFUSION 1 HR: CPT

## 2025-04-21 PROCEDURE — 96367 TX/PROPH/DG ADDL SEQ IV INF: CPT

## 2025-04-21 PROCEDURE — 250N000013 HC RX MED GY IP 250 OP 250 PS 637

## 2025-04-21 PROCEDURE — 258N000003 HC RX IP 258 OP 636

## 2025-04-21 RX ORDER — HEPARIN SODIUM (PORCINE) LOCK FLUSH IV SOLN 100 UNIT/ML 100 UNIT/ML
5 SOLUTION INTRAVENOUS
Status: CANCELLED | OUTPATIENT
Start: 2025-04-21

## 2025-04-21 RX ORDER — DIPHENHYDRAMINE HYDROCHLORIDE 50 MG/ML
50 INJECTION, SOLUTION INTRAMUSCULAR; INTRAVENOUS
Status: CANCELLED
Start: 2025-04-21

## 2025-04-21 RX ORDER — ALBUTEROL SULFATE 0.83 MG/ML
2.5 SOLUTION RESPIRATORY (INHALATION)
Status: CANCELLED | OUTPATIENT
Start: 2025-04-21

## 2025-04-21 RX ORDER — ALBUTEROL SULFATE 90 UG/1
1-2 INHALANT RESPIRATORY (INHALATION)
Status: CANCELLED
Start: 2025-04-21

## 2025-04-21 RX ORDER — MEPERIDINE HYDROCHLORIDE 25 MG/ML
25 INJECTION INTRAMUSCULAR; INTRAVENOUS; SUBCUTANEOUS
Status: CANCELLED | OUTPATIENT
Start: 2025-04-21

## 2025-04-21 RX ORDER — HEPARIN SODIUM,PORCINE 10 UNIT/ML
5-20 VIAL (ML) INTRAVENOUS DAILY PRN
Status: CANCELLED | OUTPATIENT
Start: 2025-04-21

## 2025-04-21 RX ORDER — DIPHENHYDRAMINE HYDROCHLORIDE 50 MG/ML
25 INJECTION, SOLUTION INTRAMUSCULAR; INTRAVENOUS
Status: CANCELLED
Start: 2025-04-21

## 2025-04-21 RX ORDER — EPINEPHRINE 1 MG/ML
0.3 INJECTION, SOLUTION INTRAMUSCULAR; SUBCUTANEOUS EVERY 5 MIN PRN
Status: CANCELLED | OUTPATIENT
Start: 2025-04-21

## 2025-04-21 RX ORDER — DIPHENHYDRAMINE HCL 25 MG
50 CAPSULE ORAL
Status: CANCELLED
Start: 2025-04-21

## 2025-04-21 RX ORDER — ONDANSETRON 2 MG/ML
8 INJECTION INTRAMUSCULAR; INTRAVENOUS ONCE
Status: CANCELLED | OUTPATIENT
Start: 2025-04-21

## 2025-04-21 RX ORDER — LORAZEPAM 2 MG/ML
0.5 INJECTION INTRAMUSCULAR EVERY 4 HOURS PRN
Status: CANCELLED | OUTPATIENT
Start: 2025-04-21

## 2025-04-21 RX ORDER — METHYLPREDNISOLONE SODIUM SUCCINATE 40 MG/ML
40 INJECTION INTRAMUSCULAR; INTRAVENOUS
Status: CANCELLED
Start: 2025-04-21

## 2025-04-21 RX ORDER — HEPARIN SODIUM (PORCINE) LOCK FLUSH IV SOLN 100 UNIT/ML 100 UNIT/ML
5 SOLUTION INTRAVENOUS
Status: DISCONTINUED | OUTPATIENT
Start: 2025-04-21 | End: 2025-04-21 | Stop reason: HOSPADM

## 2025-04-21 RX ORDER — DIPHENHYDRAMINE HCL 25 MG
50 CAPSULE ORAL
Status: DISCONTINUED | OUTPATIENT
Start: 2025-04-21 | End: 2025-04-21 | Stop reason: HOSPADM

## 2025-04-21 RX ORDER — ONDANSETRON 2 MG/ML
8 INJECTION INTRAMUSCULAR; INTRAVENOUS ONCE
Status: COMPLETED | OUTPATIENT
Start: 2025-04-21 | End: 2025-04-21

## 2025-04-21 RX ADMIN — DEXAMETHASONE SODIUM PHOSPHATE 20 MG: 10 INJECTION, SOLUTION INTRAMUSCULAR; INTRAVENOUS at 09:00

## 2025-04-21 RX ADMIN — SODIUM CHLORIDE 250 ML: 9 INJECTION, SOLUTION INTRAVENOUS at 08:48

## 2025-04-21 RX ADMIN — ONDANSETRON 8 MG: 2 INJECTION INTRAMUSCULAR; INTRAVENOUS at 08:48

## 2025-04-21 RX ADMIN — DIPHENHYDRAMINE HYDROCHLORIDE 50 MG: 25 CAPSULE ORAL at 08:48

## 2025-04-21 RX ADMIN — HEPARIN 5 ML: 100 SYRINGE at 10:54

## 2025-04-21 RX ADMIN — PACLITAXEL 161 MG: 6 INJECTION, SOLUTION INTRAVENOUS at 09:18

## 2025-04-21 RX ADMIN — FAMOTIDINE 20 MG: 10 INJECTION, SOLUTION INTRAVENOUS at 08:53

## 2025-04-21 ASSESSMENT — PAIN SCALES - GENERAL: PAINLEVEL_OUTOF10: MILD PAIN (1)

## 2025-04-21 NOTE — PROGRESS NOTES
"Oncology Rooming Note    April 21, 2025 8:03 AM   Florinda Keller is a 44 year old female who presents for:    Chief Complaint   Patient presents with    Oncology Clinic Visit     Malignant neoplasm of overlapping sites of left breast in female, estrogen receptor positive (H)     Initial Vitals: /69 (BP Location: Right arm, Patient Position: Sitting, Cuff Size: Adult Large)   Pulse 74   Temp 98.5  F (36.9  C) (Oral)   Resp 16   Ht 1.727 m (5' 8\")   Wt 85.7 kg (189 lb)   LMP 02/13/2025 (Approximate)   SpO2 98%   BMI 28.74 kg/m   Estimated body mass index is 28.74 kg/m  as calculated from the following:    Height as of this encounter: 1.727 m (5' 8\").    Weight as of this encounter: 85.7 kg (189 lb). Body surface area is 2.03 meters squared.  Mild Pain (1) Comment: Data Unavailable   Patient's last menstrual period was 02/13/2025 (approximate).  Allergies reviewed: Yes  Medications reviewed: Yes    Medications: Medication refills not needed today.  Pharmacy name entered into [employer]:    [id number] [place] [facility] [address]  [id number] [place] [facility] [address]    Frailty Screening:   Is the patient here for a new oncology consult visit in cancer care? 2. No    PHQ9:  Did this patient require a PHQ9?: No      Clinical concerns: Minor pain at her port site.Port was placed 3 weeks ago.       Janene Hebert MA              "

## 2025-04-21 NOTE — LETTER
4/21/2025      Florinda Keller  [address] [place] [address]      Dear Colleague,    Thank you for referring your patient, Florinda Keller, to the [facility]. Please see a copy of my visit note below.    Oncology/Hematology Visit Note  4/7/2025     Reason for Visit: Follow-up- Left Breast Cancer  - ER positive, CO positive, HER2 negative, MammaPrint high risk, Ki-67 = 17%     Primary Oncologist: Dr. Tariq    Oncology HPI:   - 1/22/2025: Mammogram at [facility] obtained for increased firmness and swelling of left breast over course of 1 month.  Imaging showed at least a 4.1 cm asymmetry in the left upper outer breast and slight thickening of the left breast.  Ultrasound of left breast showed vague, irregular hypoechoic mass at 1:00, 9 cm from nipple measuring at least 4.3 x 3.8 x 3.1 cm.  No suspicious -.  1/31/2025: Ultrasound-guided left breast core biopsy showed grade 2 invasive lobular carcinoma, angiolymphatic invasion absent, associated LCIS present, ER moderate positive at 95%, CO strongly positive at 98%, HER2 negative with IHC = 1+, Ki-67 = 17%.  MammaPrint high risk-1, luminal B, 6% absolute chemo benefit.  -.  2/20/2025: Breast MRI at [facility] showed abnormal non-mass type enhancement measuring 12 x 6 x 11 cm occupying most of anterior mid and lateral LEFT breast with tethering of underlying pectoralis major muscle but no suspicious enhancement within the muscle itself; additional mass at 3:00 posteriorly on the left appears to represent an intramammary lymph node; mildly enlarged lymph node in the low LEFT axilla was biopsied on 3/11/2025; Sscattered foci of enhancement within the RIGHT breast with the most discrete of these is a mass measuring up to 1.0 cm in maximal dimension in the central core, anterior to middle depth.  - 2/25/2025: CT C/A/P and NM bone scan: reportedly negative for metastatic disease. 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  - 3/11/2025: Left axillary  "lymph node biopsy positive for metastatic lobular carcinoma of breast, ER positive at 90%, NJ positive at 90%, HER-2 negative with IHC = 1+.  - 3/18/2025: Right breast biopsy at 8:00 negative for malignancy.  - 3/21/2025: Cerianna PET scan showed Left breast mass 9.6 cm with mild PET Cerianna uptake consistent with known ER+ positive breast cancer; 12 mm left axillary lymph node with mild PET Cerianna uptake consistent with metastasis. Several additional smaller left axillary and subpectoral lymph nodes do not take up PET Cerianna and are indeterminate.  - 3/31/25: started weekly Taxol    Current Treatment: weekly Taxol    Interval history:   Florinda presents to clinic for D1C4 Taxol, she is accompanied by her father. She states that her port is , making it difficult to rest. She hasn't tried Tylenol for discomfort. She has occasional nausea, hasn't tried Pepcid.     Denies neuropathy.     She needs paperwork for work/insurance completed.     REVIEW OF SYSTEMS:   14 point ROS was reviewed and is negative other than as noted above in HPI.     Current Outpatient Medications   Medication Sig Dispense Refill     levothyroxine (SYNTHROID/LEVOTHROID) 88 MCG tablet Take 88 mcg by mouth daily       lidocaine-prilocaine (EMLA) 2.5-2.5 % external cream Apply to port site at least 15-30 min prior to port access 30 g 3     prochlorperazine (COMPAZINE) 10 MG tablet Take 1 tablet (10 mg) by mouth every 6 hours as needed for nausea or vomiting. 30 tablet 2          Allergies   Allergen Reactions     Animal Dander Cough, Difficulty breathing and Itching     Cat Dander Cough, Difficulty breathing and Itching     Latex      Morphine Nausea and Vomiting     Other (Do Not Use) Nausea and Vomiting     *Darvocet A500         Exam:  Blood pressure 101/69, pulse 74, temperature 98.5  F (36.9  C), temperature source Oral, resp. rate 16, height 1.727 m (5' 8\"), weight 85.7 kg (189 lb), last menstrual period 02/13/2025, SpO2 98%, " unknown if currently breastfeeding.  Wt Readings from Last 4 Encounters:   04/21/25 85.7 kg (189 lb)   04/14/25 85.9 kg (189 lb 6.4 oz)   04/07/25 86 kg (189 lb 9.6 oz)   03/31/25 84.7 kg (186 lb 12.8 oz)       Constitutional: Pleasant female in no acute distress.  Eyes: No scleral icterus. No conjunctival erythema or discharge  Cardiovascular: Regular rate and rhythm. No murmurs, gallops, or rubs. No peripheral edema.  Respiratory: Clear to auscultation bilaterally. No wheezes or crackles.  Gastrointestinal: Bowel sounds present. Abdomen soft, non-tender. No palpable hepatosplenomegaly or masses.   MSK: moving all extremities freely  Neuro: Cranial nerves II-XII intact  Gait:walking unassisted  Skin: No visible lesions, bruising or rashes; port site without erythema or edema  Psych: appropriate mood and affect     Labs:    Latest Reference Range & Units 04/20/25 07:29   Albumin 3.5 - 5.2 g/dL 4.3   Protein Total 6.4 - 8.3 g/dL 6.7   Alkaline Phosphatase 40 - 150 U/L 60   ALT 0 - 50 U/L 31   AST 0 - 45 U/L 19   Bilirubin Direct 0.00 - 0.30 mg/dL 0.16   Bilirubin Total <=1.2 mg/dL 0.7   WBC 4.0 - 11.0 10e3/uL 4.4   Hemoglobin 11.7 - 15.7 g/dL 12.7   Hematocrit 35.0 - 47.0 % 38.9   Platelet Count 150 - 450 10e3/uL 305   RBC Count 3.80 - 5.20 10e6/uL 4.22   MCV 78 - 100 fL 92   MCH 26.5 - 33.0 pg 30.1   MCHC 31.5 - 36.5 g/dL 32.6   RDW 10.0 - 15.0 % 12.8   % Neutrophils % 49   % Lymphocytes % 44   % Monocytes % 4   % Eosinophils % 1   % Basophils % 1   % Immature Granulocytes % 1   Absolute Basophils 0.0 - 0.2 10e3/uL 0.0   Absolute Eosinophils 0.0 - 0.7 10e3/uL 0.1   Absolute Immature Granulocytes <=0.4 10e3/uL 0.0   Absolute Lymphocytes 0.8 - 5.3 10e3/uL 2.0   Absolute Monocytes 0.0 - 1.3 10e3/uL 0.2   Absolute Neutrophils 1.6 - 8.3 10e3/uL 2.2   Absolute NRBCs 10e3/uL 0.0   NRBCs per 100 WBC <1 /100 0       Imaging: reviewed    Impression/plan: Florinda is a 44 year old female diagnosed with Left Breast Cancer    Left  Breast Cancer  -  Clinical prognostic stage IIIA, cT3-N1-MX, grade 2 invasive lobular carcinoma of the left upper outer breast, ER positive, CO positive, HER2 negative, MammaPrint high risk, Ki-67 = 17%   - 3/21/2025 Cerianna PET scan with uptake within the known large left breast invasive lobular carcinoma, left axillary node, but no other site and no distant metastatic disease.  --Given the very large size of this tumor, she would benefit from attempted downstaging with neoadjuvant chemotherapy.  - Treatment Plan:  weekly paclitaxel for 12 weeks with reimaging thereafter and likely additional chemotherapy with dose dense Adriamycin and Cytoxan given high risk Mammaprint result. She would then proceed to mastectomy under care of Dr. Nancy Shaikh.  - She will need adjuvant radiation therapy as well as adjuvant endocrine therapy with ovarian suppression therapy with Zoladex injections every 4 weeks in conjunction with an aromatase inhibitor such as anastrozole.   --3/11/2025 Left axillary node biopsy positive for metastatic lobular carcinoma.  --3/18/2025 Right breast biopsy was negative for malignancy.  --3/10/2025 CA 15-3 was elevated at 990.  Dr. Tariq discussed that although NCCN guidelines do not recommend following tumor markers for routine surveillance, following her CA 15-3 will be helpful in her specific situation for treatment decision making due to the concern for additional micrometastatic disease.  -  Will need interval breast MRI after completing the paclitaxel portion of her chemo  - 4/21/2025: labs reviewed ok to proceed with Taxol  - 4/24: Dr. Tariq    GI  - possible reflux, advised starting Pepcid  - advised salt/soda rinses for oral hygiene  - will monitor     Port Discomfort  - will monitor for persistent symptoms  - Advised Tylenol 1000 mg TID, she is seeing Dr. Tariq on 4/24 she can let her know if tylenol is helping with symptoms  - consider IR if remains uncomfortable     Fertility   -She  "has one child, age 5 and will not be planning any more children.    Hypothyroidism  - continue levothyroxine    Reena Dow PA-C  The longitudinal plan of care for the diagnosis(es)/condition(s) as documented were addressed during this visit. Due to the added complexity in care, I will continue to support Florinda in the subsequent management and with ongoing continuity of care.        Oncology Rooming Note    April 21, 2025 8:03 AM   Florinda Keller is a 44 year old female who presents for:    Chief Complaint   Patient presents with     Oncology Clinic Visit     Malignant neoplasm of overlapping sites of left breast in female, estrogen receptor positive (H)     Initial Vitals: /69 (BP Location: Right arm, Patient Position: Sitting, Cuff Size: Adult Large)   Pulse 74   Temp 98.5  F (36.9  C) (Oral)   Resp 16   Ht 1.727 m (5' 8\")   Wt 85.7 kg (189 lb)   LMP 02/13/2025 (Approximate)   SpO2 98%   BMI 28.74 kg/m   Estimated body mass index is 28.74 kg/m  as calculated from the following:    Height as of this encounter: 1.727 m (5' 8\").    Weight as of this encounter: 85.7 kg (189 lb). Body surface area is 2.03 meters squared.  Mild Pain (1) Comment: Data Unavailable   Patient's last menstrual period was 02/13/2025 (approximate).  Allergies reviewed: Yes  Medications reviewed: Yes    Medications: Medication refills not needed today.  Pharmacy name entered into [employer]:    [id number] [place] [facility] [address]  [facility] [id number] [place] [facility] [address]    Frailty Screening:   Is the patient here for a new oncology consult visit in cancer care? 2. No    PHQ9:  Did this patient require a PHQ9?: No      Clinical concerns: Minor pain at her port site.Port was placed 3 weeks ago.       Janene Hebert MA                Again, thank you for allowing me to participate in the care of your patient.        Sincerely,        Reena Dow PA-C    Electronically signed"

## 2025-04-21 NOTE — PROGRESS NOTES
Oncology/Hematology Visit Note  4/7/2025     Reason for Visit: Follow-up- Left Breast Cancer  - ER positive, IL positive, HER2 negative, MammaPrint high risk, Ki-67 = 17%     Primary Oncologist: Dr. Tariq    Oncology HPI:   - 1/22/2025: Mammogram at [facility] obtained for increased firmness and swelling of left breast over course of 1 month.  Imaging showed at least a 4.1 cm asymmetry in the left upper outer breast and slight thickening of the left breast.  Ultrasound of left breast showed vague, irregular hypoechoic mass at 1:00, 9 cm from nipple measuring at least 4.3 x 3.8 x 3.1 cm.  No suspicious -.  1/31/2025: Ultrasound-guided left breast core biopsy showed grade 2 invasive lobular carcinoma, angiolymphatic invasion absent, associated LCIS present, ER moderate positive at 95%, IL strongly positive at 98%, HER2 negative with IHC = 1+, Ki-67 = 17%.  MammaPrint high risk-1, luminal B, 6% absolute chemo benefit.  -.  2/20/2025: Breast MRI at [facility] showed abnormal non-mass type enhancement measuring 12 x 6 x 11 cm occupying most of anterior mid and lateral LEFT breast with tethering of underlying pectoralis major muscle but no suspicious enhancement within the muscle itself; additional mass at 3:00 posteriorly on the left appears to represent an intramammary lymph node; mildly enlarged lymph node in the low LEFT axilla was biopsied on 3/11/2025; Sscattered foci of enhancement within the RIGHT breast with the most discrete of these is a mass measuring up to 1.0 cm in maximal dimension in the central core, anterior to middle depth.  - 2/25/2025: CT C/A/P and NM bone scan: reportedly negative for metastatic disease. 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  - 3/11/2025: Left axillary lymph node biopsy positive for metastatic lobular carcinoma of breast, ER positive at 90%, IL positive at 90%, HER-2 negative with IHC = 1+.  - 3/18/2025: Right breast biopsy at 8:00 negative for malignancy.  - 3/21/2025: Cerianna PET scan  "showed Left breast mass 9.6 cm with mild PET Cerianna uptake consistent with known ER+ positive breast cancer; 12 mm left axillary lymph node with mild PET Cerianna uptake consistent with metastasis. Several additional smaller left axillary and subpectoral lymph nodes do not take up PET Cerianna and are indeterminate.  - 3/31/25: started weekly Taxol    Current Treatment: weekly Taxol    Interval history:   Florinda presents to clinic for D1C4 Taxol, she is accompanied by her father. She states that her port is , making it difficult to rest. She hasn't tried Tylenol for discomfort. She has occasional nausea, hasn't tried Pepcid.     Denies neuropathy.     She needs paperwork for work/insurance completed.     REVIEW OF SYSTEMS:   14 point ROS was reviewed and is negative other than as noted above in HPI.     Current Outpatient Medications   Medication Sig Dispense Refill    levothyroxine (SYNTHROID/LEVOTHROID) 88 MCG tablet Take 88 mcg by mouth daily      lidocaine-prilocaine (EMLA) 2.5-2.5 % external cream Apply to port site at least 15-30 min prior to port access 30 g 3    prochlorperazine (COMPAZINE) 10 MG tablet Take 1 tablet (10 mg) by mouth every 6 hours as needed for nausea or vomiting. 30 tablet 2          Allergies   Allergen Reactions    Animal Dander Cough, Difficulty breathing and Itching    Cat Dander Cough, Difficulty breathing and Itching    Latex     Morphine Nausea and Vomiting    Other (Do Not Use) Nausea and Vomiting     *Darvocet A500         Exam:  Blood pressure 101/69, pulse 74, temperature 98.5  F (36.9  C), temperature source Oral, resp. rate 16, height 1.727 m (5' 8\"), weight 85.7 kg (189 lb), last menstrual period 02/13/2025, SpO2 98%, unknown if currently breastfeeding.  Wt Readings from Last 4 Encounters:   04/21/25 85.7 kg (189 lb)   04/14/25 85.9 kg (189 lb 6.4 oz)   04/07/25 86 kg (189 lb 9.6 oz)   03/31/25 84.7 kg (186 lb 12.8 oz)       Constitutional: Pleasant female in no " acute distress.  Eyes: No scleral icterus. No conjunctival erythema or discharge  Cardiovascular: Regular rate and rhythm. No murmurs, gallops, or rubs. No peripheral edema.  Respiratory: Clear to auscultation bilaterally. No wheezes or crackles.  Gastrointestinal: Bowel sounds present. Abdomen soft, non-tender. No palpable hepatosplenomegaly or masses.   MSK: moving all extremities freely  Neuro: Cranial nerves II-XII intact  Gait:walking unassisted  Skin: No visible lesions, bruising or rashes; port site without erythema or edema  Psych: appropriate mood and affect     Labs:    Latest Reference Range & Units 04/20/25 07:29   Albumin 3.5 - 5.2 g/dL 4.3   Protein Total 6.4 - 8.3 g/dL 6.7   Alkaline Phosphatase 40 - 150 U/L 60   ALT 0 - 50 U/L 31   AST 0 - 45 U/L 19   Bilirubin Direct 0.00 - 0.30 mg/dL 0.16   Bilirubin Total <=1.2 mg/dL 0.7   WBC 4.0 - 11.0 10e3/uL 4.4   Hemoglobin 11.7 - 15.7 g/dL 12.7   Hematocrit 35.0 - 47.0 % 38.9   Platelet Count 150 - 450 10e3/uL 305   RBC Count 3.80 - 5.20 10e6/uL 4.22   MCV 78 - 100 fL 92   MCH 26.5 - 33.0 pg 30.1   MCHC 31.5 - 36.5 g/dL 32.6   RDW 10.0 - 15.0 % 12.8   % Neutrophils % 49   % Lymphocytes % 44   % Monocytes % 4   % Eosinophils % 1   % Basophils % 1   % Immature Granulocytes % 1   Absolute Basophils 0.0 - 0.2 10e3/uL 0.0   Absolute Eosinophils 0.0 - 0.7 10e3/uL 0.1   Absolute Immature Granulocytes <=0.4 10e3/uL 0.0   Absolute Lymphocytes 0.8 - 5.3 10e3/uL 2.0   Absolute Monocytes 0.0 - 1.3 10e3/uL 0.2   Absolute Neutrophils 1.6 - 8.3 10e3/uL 2.2   Absolute NRBCs 10e3/uL 0.0   NRBCs per 100 WBC <1 /100 0       Imaging: reviewed    Impression/plan: Florinda is a 44 year old female diagnosed with Left Breast Cancer    Left Breast Cancer  -  Clinical prognostic stage IIIA, cT3-N1-MX, grade 2 invasive lobular carcinoma of the left upper outer breast, ER positive, MA positive, HER2 negative, MammaPrint high risk, Ki-67 = 17%   - 3/21/2025 Cerianna PET scan with uptake  within the known large left breast invasive lobular carcinoma, left axillary node, but no other site and no distant metastatic disease.  --Given the very large size of this tumor, she would benefit from attempted downstaging with neoadjuvant chemotherapy.  - Treatment Plan:  weekly paclitaxel for 12 weeks with reimaging thereafter and likely additional chemotherapy with dose dense Adriamycin and Cytoxan given high risk Mammaprint result. She would then proceed to mastectomy under care of Dr. Nancy Shaikh.  - She will need adjuvant radiation therapy as well as adjuvant endocrine therapy with ovarian suppression therapy with Zoladex injections every 4 weeks in conjunction with an aromatase inhibitor such as anastrozole.   --3/11/2025 Left axillary node biopsy positive for metastatic lobular carcinoma.  --3/18/2025 Right breast biopsy was negative for malignancy.  --3/10/2025 CA 15-3 was elevated at 990.  Dr. Tariq discussed that although NCCN guidelines do not recommend following tumor markers for routine surveillance, following her CA 15-3 will be helpful in her specific situation for treatment decision making due to the concern for additional micrometastatic disease.  -  Will need interval breast MRI after completing the paclitaxel portion of her chemo  - 4/21/2025: labs reviewed ok to proceed with Taxol  - 4/24: Dr. Tariq    GI  - possible reflux, advised starting Pepcid  - advised salt/soda rinses for oral hygiene  - will monitor     Port Discomfort  - will monitor for persistent symptoms  - Advised Tylenol 1000 mg TID, she is seeing Dr. Tariq on 4/24 she can let her know if tylenol is helping with symptoms  - consider IR if remains uncomfortable     Fertility   -She has one child, age 5 and will not be planning any more children.    Hypothyroidism  - continue levothyroxine    Reena Dow PA-C  The longitudinal plan of care for the diagnosis(es)/condition(s) as documented were addressed during this visit. Due to  the added complexity in care, I will continue to support Florinda in the subsequent management and with ongoing continuity of care.

## 2025-04-21 NOTE — PROGRESS NOTES
"Infusion Nursing Note:  Florinda Keller presents today for C4D1 Taxol.    Patient seen by provider today: Yes: Reena Dow PA-C   present during visit today: Not Applicable.    Note: pt reports tolerating treatment well. Using ice (pt brings) to hands/feet during Taxol. Pt interested in seeing if could decrease infusion time of Taxol-will admin at following rate:  25ml/hr x 10\"  50ml/hr x 10\"  100ml/hr x 5\"  150ml/hr x 5\"  200ml/hr x 5\"  250ml/hr x 5\"  301ml/hr remainder.      Intravenous Access:  Implanted Port.    Treatment Conditions:  Lab Results   Component Value Date    HGB 12.7 04/20/2025    WBC 4.4 04/20/2025    ANEUTAUTO 2.2 04/20/2025     04/20/2025        Results reviewed, labs MET treatment parameters, ok to proceed with treatment.      Post Infusion Assessment:  Patient tolerated infusion without incident. Would like to increase rate of Taxol infusion with next dose on 4/28/25  Blood return noted pre and post infusion.  Site patent and intact, free from redness, edema or discomfort.  No evidence of extravasations.  Access discontinued per protocol.       Discharge Plan:   Discharge instructions reviewed with: Patient and Family.  Patient and/or family verbalized understanding of discharge instructions and all questions answered.  AVS to patient via [employer].  Patient will return as scheduled for next appointment.   Patient discharged in stable condition accompanied by: father.  Departure Mode: Ambulatory.      Kitty Webster RN    "

## 2025-04-24 ENCOUNTER — ONCOLOGY VISIT (OUTPATIENT)
Dept: ONCOLOGY | Facility: CLINIC | Age: 45
End: 2025-04-24
Attending: INTERNAL MEDICINE
Payer: COMMERCIAL

## 2025-04-24 VITALS
BODY MASS INDEX: 29.5 KG/M2 | HEART RATE: 87 BPM | WEIGHT: 194 LBS | SYSTOLIC BLOOD PRESSURE: 107 MMHG | RESPIRATION RATE: 16 BRPM | OXYGEN SATURATION: 99 % | DIASTOLIC BLOOD PRESSURE: 66 MMHG

## 2025-04-24 DIAGNOSIS — C77.3 BREAST CANCER METASTASIZED TO AXILLARY LYMPH NODE, LEFT (H): ICD-10-CM

## 2025-04-24 DIAGNOSIS — C50.912 BREAST CANCER METASTASIZED TO AXILLARY LYMPH NODE, LEFT (H): ICD-10-CM

## 2025-04-24 DIAGNOSIS — C50.812 MALIGNANT NEOPLASM OF OVERLAPPING SITES OF LEFT BREAST IN FEMALE, ESTROGEN RECEPTOR POSITIVE (H): Primary | ICD-10-CM

## 2025-04-24 DIAGNOSIS — Z17.0 MALIGNANT NEOPLASM OF OVERLAPPING SITES OF LEFT BREAST IN FEMALE, ESTROGEN RECEPTOR POSITIVE (H): Primary | ICD-10-CM

## 2025-04-24 PROCEDURE — 99213 OFFICE O/P EST LOW 20 MIN: CPT | Performed by: INTERNAL MEDICINE

## 2025-04-24 ASSESSMENT — PAIN SCALES - GENERAL: PAINLEVEL_OUTOF10: MILD PAIN (1)

## 2025-04-24 NOTE — PATIENT INSTRUCTIONS
1. Omit 6/23/2025 MRI breast.  2. Arrange for lab draw and Adriamycin + Cytoxan + Neulasta Onpro every 2 weeks x 4 cycles starting 6/23/2025.  3. RTC NP with first 3 cycles of dose-dense AC.   4. RTC MD with 4th cycle of dose-dense AC.

## 2025-04-24 NOTE — LETTER
4/24/2025      Florinda Keller  [address] [place] [address]      Dear Colleague,    Thank you for referring your patient, Florinda Keller, to the [facility]. Please see a copy of my visit note below.    [facility]    Hematology/Oncology Established Patient Note      Today's Date: 4/24/2025    Reason for visit: Left breast cancer.    HISTORY OF PRESENT ILLNESS: Florinda Keller is a 44 year old female who presents with the following oncologic history:  1.  1/22/2025: Mammogram at [facility] obtained for increased firmness and swelling of left breast over course of 1 month.  Imaging showed at least a 4.1 cm asymmetry in the left upper outer breast and slight thickening of the left breast.  Ultrasound of left breast showed vague, irregular hypoechoic mass at 1:00, 9 cm from nipple measuring at least 4.3 x 3.8 x 3.1 cm.  No suspicious lymph nodes within left axilla.  2.  1/31/2025: Ultrasound-guided left breast core biopsy showed grade 2 invasive lobular carcinoma, angiolymphatic invasion absent, associated LCIS present, ER moderate positive at 95%, DC strongly positive at 98%, HER2 negative with IHC = 1+, Ki-67 = 17%.  MammaPrint high risk-1, luminal B, 6% absolute chemo benefit.  3.  2/20/2025: Breast MRI at [facility] showed abnormal non-mass type enhancement measuring 12 x 6 x 11 cm occupying most of anterior mid and lateral LEFT breast with tethering of underlying pectoralis major muscle but no suspicious enhancement within the  muscle itself; additional mass at 3:00 posteriorly on the left appears to represent an intramammary lymph node; mildly enlarged lymph node in the low LEFT axilla was biopsied on 3/11/2025; Sscattered foci of enhancement within the RIGHT breast with the most  discrete of these is a mass measuring up to 1.0 cm in maximal dimension in the central core, anterior to middle depth.  4. 2/25/2025: CT C/A/P and NM bone scan: reportedly negative for  metastatic disease. 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  5. 3: Left axillary lymph node biopsy positive for metastatic lobular carcinoma of breast, ER positive at 90%, W[place] positive at 90%, HER-2 negative with IHC = 1+.  6. 3/18/2025: Right breast biopsy at 8:00 negative for malignancy.  7. 3/21/2025: Cerianna PET scan showed Left breast mass 9.6 cm with mild PET Cerianna uptake consistent with known ER+ positive breast cancer; 12 mm left axillary lymph node with mild PET Cerianna uptake consistent with metastasis. Several additional smaller left axillary and subpectoral lymph nodes do not take up PET Cerianna and are indeterminate.  8. 3/31/2025: Started neoadjuvant paclitaxel weekly.    INTERVAL HISTORY:  Florinda reports some dullness of fingertips. Denies tingling.    REVIEW OF SYSTEMS:   14 point ROS was reviewed and is negative other than as noted above in HPI.       HOME MEDICATIONS:  Current Outpatient Medications   Medication Sig Dispense Refill     levothyroxine (SYNTHROID/LEVOTHROID) 88 MCG tablet Take 88 mcg by mouth daily       lidocaine-prilocaine (EMLA) 2.5-2.5 % external cream Apply to port site at least 15-30 min prior to port access 30 g 3     prochlorperazine (COMPAZINE) 10 MG tablet Take 1 tablet (10 mg) by mouth every 6 hours as needed for nausea or vomiting. 30 tablet 2         ALLERGIES:  Allergies   Allergen Reactions     Animal Dander Cough, Difficulty breathing and Itching     Cat Dander Cough, Difficulty breathing and Itching     Latex      Morphine Nausea and Vomiting     Other (Do Not Use) Nausea and Vomiting     *Darvocet A500         PAST MEDICAL HISTORY:  Past Medical History:   Diagnosis Date     Breast mass, left      Hx of anxiety      Hx of ovarian cyst      Hx of panic disorder      Hypothyroidism      Invasive ductal carcinoma of breast (H)      Neurological disorder      Gynecologic history:  Age of menarche at 13, , age of first pregnancy at 41, breast-fed her  child.  Used OCPs for 26 years.  No HRT.    PAST SURGICAL HISTORY:  Past Surgical History:   Procedure Laterality Date     INSERT PORT VASCULAR ACCESS Right 3/28/2025    Procedure: INSERTION, VASCULAR ACCESS PORT;  Surgeon: Malik Maynard MD;  Location: [facility] OR     SKIN BIOPSY       SOFT TISSUE SURGERY Left     hand cyst     THYROIDECTOMY, PARTIAL       WISDOM TOOTH EXTRACTION           SOCIAL HISTORY:  Social History     Socioeconomic History     Marital status:      Spouse name: Not on file     Number of children: Not on file     Years of education: Not on file     Highest education level: Not on file   Occupational History     Not on file   Tobacco Use     Smoking status: Never     Smokeless tobacco: Never   Substance and Sexual Activity     Alcohol use: Not on file     Drug use: Never     Sexual activity: Yes     Partners: Male     Birth control/protection: None   Other Topics Concern     Not on file   Social History Narrative     Not on file     Social Drivers of Health     Financial Resource Strain: Not on file   Food Insecurity: Not on file   Transportation Needs: Not on file   Physical Activity: Not on file   Stress: Not on file   Social Connections: Not on file   Interpersonal Safety: Low Risk  (3/28/2025)    Interpersonal Safety      Do you feel physically and emotionally safe where you currently live?: Yes      Within the past 12 months, have you been hit, slapped, kicked or otherwise physically hurt by someone?: No      Within the past 12 months, have you been humiliated or emotionally abused in other ways by your partner or ex-partner?: Not on file   Housing Stability: Not on file   Has 3 year-old child with  Mahogany Neely works for [employer] as a .    FAMILY HISTORY:  Family history significant for mother with breast cancer at age 72.  Paternal grandmother with breast cancer age 50's and ovarian cancer at age 85. Maternal aunt with lung cancer.    PHYSICAL EXAM:  Vital  signs:  /66   Pulse 87   Resp 16   Wt 88 kg (194 lb)   LMP 2025 (Approximate)   SpO2 99%   BMI 29.50 kg/m     ECO  GENERAL/CONSTITUTIONAL: No acute distress.  EYES:  No scleral icterus.  ENT/MOUTH: Neck supple.  LYMPH: Palpable left axillary adenopathy.   BREAST: Left breast higher and smaller than right breast.  Palpable large and firm mass over three quarters of breast with slight softening compared to prior exam; breast is movable from posterior chest wall.  RESPIRATORY: No audible cough or wheezing.  GASTROINTESTINAL: No guarding.  No distention.  MUSCULOSKELETAL: Warm and well-perfused, no cyanosis, clubbing, or edema.  NEUROLOGIC: No focal motor deficits. Alert, oriented, answers questions appropriately.  INTEGUMENTARY: No rashes or jaundice.  GAIT: Steady, does not use assistive device      LABS:  CBC RESULTS:   Recent Labs   Lab Test 25  1123   WBC 5.7   RBC 4.07   HGB 12.3   HCT 36.8   MCV 90   MCH 30.2   MCHC 33.4   RDW 12.6        Last Comprehensive Metabolic Panel:  Sodium   Date Value Ref Range Status   03/10/2025 137 135 - 145 mmol/L Final     Potassium   Date Value Ref Range Status   03/10/2025 4.3 3.4 - 5.3 mmol/L Final     Chloride   Date Value Ref Range Status   03/10/2025 101 98 - 107 mmol/L Final     Carbon Dioxide (CO2)   Date Value Ref Range Status   03/10/2025 23 22 - 29 mmol/L Final     Anion Gap   Date Value Ref Range Status   03/10/2025 13 7 - 15 mmol/L Final     Glucose   Date Value Ref Range Status   03/10/2025 93 70 - 99 mg/dL Final     Urea Nitrogen   Date Value Ref Range Status   03/10/2025 6.5 6.0 - 20.0 mg/dL Final     Creatinine   Date Value Ref Range Status   03/10/2025 0.65 0.51 - 0.95 mg/dL Final     GFR Estimate   Date Value Ref Range Status   03/10/2025 >90 >60 mL/min/1.73m2 Final     Comment:     eGFR calculated using  CKD-EPI equation.     Calcium   Date Value Ref Range Status   03/10/2025 9.2 8.8 - 10.4 mg/dL Final     Bilirubin Total    Date Value Ref Range Status   03/31/2025 0.8 <=1.2 mg/dL Final     Alkaline Phosphatase   Date Value Ref Range Status   03/31/2025 63 40 - 150 U/L Final     ALT   Date Value Ref Range Status   03/31/2025 18 0 - 50 U/L Final     AST   Date Value Ref Range Status   03/31/2025 23 0 - 45 U/L Final          PATHOLOGY:  Reviewed as per HPI.    IMAGING:  Reviewed as per HPI.    ASSESSMENT/PLAN:  Florinda Keller is a 44 year old female with the following issues:  1.  Clinical prognostic stage IIIA, cT3-N1-MX, grade 2 invasive lobular carcinoma of the left upper outer breast, ER positive, TN positive, HER2 negative, MammaPrint high risk, Ki-67 = 17%  - 3/21/2025 Cerianna PET scan showed uptake within the known large left breast invasive lobular carcinoma, left axillary node, but no other site and no distant metastatic disease.  --Given the very large size of this tumor, she proceeded with attempted downstaging with neoadjuvant chemotherapy.  - Started weekly paclitaxel on 3/31/2025 for planned 12 weeks followed by dose dense Adriamycin and Cytoxan given high risk Mammaprint result. She would then proceed to mastectomy under care of Dr. Nancy Shaikh.  --Will omit the 12-week interval breast MRI as she will need ddAC for her high risk disease.  --Clinically, she has some softening of the primary breast tumor.  -She will need adjuvant radiation therapy as well as adjuvant endocrine therapy with ovarian suppression therapy with Zoladex injections every 4 weeks in conjunction with an aromatase inhibitor such as anastrozole.  --3/10/2025 CA 15-3 was elevated at 990.  Although NCCN guidelines do not recommend following tumor markers for routine surveillance, following her CA 15-3 will be helpful in her specific situation for treatment decision making due to the concern for additional micrometastatic disease.  --Continue with weekly paclitaxel.    2.  Fertility discussion  -She has one child, age 3 and will not be planning  any more children.    Racquel Tariq MD  [facility] Hematology/Oncology    Total time spent today: 30 minutes in chart review, patient evaluation, counseling, documentation, test and/or medication/prescription orders, and coordination of care.      The longitudinal plan of care for the diagnosis(es)/condition(s) as documented were addressed during this visit. Due to the added complexity in care, I will continue to support Florinda in the subsequent management and with ongoing continuity of care.      Again, thank you for allowing me to participate in the care of your patient.        Sincerely,        Racquel Tariq MD    Electronically signed

## 2025-04-27 ENCOUNTER — INFUSION THERAPY VISIT (OUTPATIENT)
Dept: INFUSION THERAPY | Facility: CLINIC | Age: 45
End: 2025-04-27
Attending: INTERNAL MEDICINE
Payer: COMMERCIAL

## 2025-04-27 DIAGNOSIS — Z17.0 MALIGNANT NEOPLASM OF OVERLAPPING SITES OF LEFT BREAST IN FEMALE, ESTROGEN RECEPTOR POSITIVE (H): Primary | ICD-10-CM

## 2025-04-27 DIAGNOSIS — C50.812 MALIGNANT NEOPLASM OF OVERLAPPING SITES OF LEFT BREAST IN FEMALE, ESTROGEN RECEPTOR POSITIVE (H): Primary | ICD-10-CM

## 2025-04-27 LAB
BASOPHILS # BLD AUTO: 0 10E3/UL (ref 0–0.2)
BASOPHILS NFR BLD AUTO: 1 %
EOSINOPHIL # BLD AUTO: 0 10E3/UL (ref 0–0.7)
EOSINOPHIL NFR BLD AUTO: 1 %
ERYTHROCYTE [DISTWIDTH] IN BLOOD BY AUTOMATED COUNT: 13.1 % (ref 10–15)
HCT VFR BLD AUTO: 40.2 % (ref 35–47)
HGB BLD-MCNC: 13.4 G/DL (ref 11.7–15.7)
IMM GRANULOCYTES # BLD: 0 10E3/UL
IMM GRANULOCYTES NFR BLD: 1 %
LYMPHOCYTES # BLD AUTO: 2 10E3/UL (ref 0.8–5.3)
LYMPHOCYTES NFR BLD AUTO: 45 %
MCH RBC QN AUTO: 30.2 PG (ref 26.5–33)
MCHC RBC AUTO-ENTMCNC: 33.3 G/DL (ref 31.5–36.5)
MCV RBC AUTO: 91 FL (ref 78–100)
MONOCYTES # BLD AUTO: 0.2 10E3/UL (ref 0–1.3)
MONOCYTES NFR BLD AUTO: 4 %
NEUTROPHILS # BLD AUTO: 2.2 10E3/UL (ref 1.6–8.3)
NEUTROPHILS NFR BLD AUTO: 49 %
NRBC # BLD AUTO: 0 10E3/UL
NRBC BLD AUTO-RTO: 0 /100
PLATELET # BLD AUTO: 275 10E3/UL (ref 150–450)
RBC # BLD AUTO: 4.44 10E6/UL (ref 3.8–5.2)
WBC # BLD AUTO: 4.4 10E3/UL (ref 4–11)

## 2025-04-27 PROCEDURE — 36415 COLL VENOUS BLD VENIPUNCTURE: CPT | Performed by: INTERNAL MEDICINE

## 2025-04-27 PROCEDURE — 85025 COMPLETE CBC W/AUTO DIFF WBC: CPT | Performed by: INTERNAL MEDICINE

## 2025-04-27 NOTE — PROGRESS NOTES
Nursing Note:  Florinda ARELLANO Krishna presents today for Peripheral labs.    Patient seen by provider today: No   present during visit today: Not Applicable.    Note: N/A.    Intravenous Access:  Lab draw site Left Arm, Needle type Butterfly, Gauge 23.  Labs drawn without difficulty.    Discharge Plan:   Patient was discharged.    Klaus Magana RN

## 2025-04-28 ENCOUNTER — INFUSION THERAPY VISIT (OUTPATIENT)
Dept: INFUSION THERAPY | Facility: CLINIC | Age: 45
End: 2025-04-28
Attending: INTERNAL MEDICINE
Payer: COMMERCIAL

## 2025-04-28 VITALS
TEMPERATURE: 98.4 F | HEART RATE: 67 BPM | RESPIRATION RATE: 16 BRPM | SYSTOLIC BLOOD PRESSURE: 100 MMHG | WEIGHT: 187.4 LBS | BODY MASS INDEX: 28.49 KG/M2 | DIASTOLIC BLOOD PRESSURE: 68 MMHG | OXYGEN SATURATION: 100 %

## 2025-04-28 DIAGNOSIS — Z17.0 MALIGNANT NEOPLASM OF OVERLAPPING SITES OF LEFT BREAST IN FEMALE, ESTROGEN RECEPTOR POSITIVE (H): Primary | ICD-10-CM

## 2025-04-28 DIAGNOSIS — C50.812 MALIGNANT NEOPLASM OF OVERLAPPING SITES OF LEFT BREAST IN FEMALE, ESTROGEN RECEPTOR POSITIVE (H): Primary | ICD-10-CM

## 2025-04-28 PROCEDURE — 96367 TX/PROPH/DG ADDL SEQ IV INF: CPT

## 2025-04-28 PROCEDURE — 250N000011 HC RX IP 250 OP 636: Performed by: INTERNAL MEDICINE

## 2025-04-28 PROCEDURE — 96413 CHEMO IV INFUSION 1 HR: CPT

## 2025-04-28 PROCEDURE — 258N000003 HC RX IP 258 OP 636: Performed by: INTERNAL MEDICINE

## 2025-04-28 PROCEDURE — 250N000013 HC RX MED GY IP 250 OP 250 PS 637: Performed by: INTERNAL MEDICINE

## 2025-04-28 PROCEDURE — 96375 TX/PRO/DX INJ NEW DRUG ADDON: CPT

## 2025-04-28 RX ORDER — DIPHENHYDRAMINE HCL 25 MG
50 CAPSULE ORAL
Status: DISCONTINUED | OUTPATIENT
Start: 2025-04-28 | End: 2025-04-28 | Stop reason: HOSPADM

## 2025-04-28 RX ORDER — ONDANSETRON 2 MG/ML
8 INJECTION INTRAMUSCULAR; INTRAVENOUS ONCE
Status: COMPLETED | OUTPATIENT
Start: 2025-04-28 | End: 2025-04-28

## 2025-04-28 RX ORDER — HEPARIN SODIUM (PORCINE) LOCK FLUSH IV SOLN 100 UNIT/ML 100 UNIT/ML
5 SOLUTION INTRAVENOUS
Status: DISCONTINUED | OUTPATIENT
Start: 2025-04-28 | End: 2025-04-28 | Stop reason: HOSPADM

## 2025-04-28 RX ADMIN — Medication 5 ML: at 11:45

## 2025-04-28 RX ADMIN — DEXAMETHASONE SODIUM PHOSPHATE 20 MG: 10 INJECTION, SOLUTION INTRAMUSCULAR; INTRAVENOUS at 09:58

## 2025-04-28 RX ADMIN — FAMOTIDINE 20 MG: 10 INJECTION, SOLUTION INTRAVENOUS at 09:53

## 2025-04-28 RX ADMIN — DIPHENHYDRAMINE HYDROCHLORIDE 50 MG: 25 CAPSULE ORAL at 09:30

## 2025-04-28 RX ADMIN — PACLITAXEL 161 MG: 6 INJECTION, SOLUTION INTRAVENOUS at 10:26

## 2025-04-28 RX ADMIN — ONDANSETRON 8 MG: 2 INJECTION INTRAMUSCULAR; INTRAVENOUS at 09:56

## 2025-04-28 RX ADMIN — SODIUM CHLORIDE 250 ML: 9 INJECTION, SOLUTION INTRAVENOUS at 09:30

## 2025-04-28 ASSESSMENT — PAIN SCALES - GENERAL: PAINLEVEL_OUTOF10: NO PAIN (0)

## 2025-04-28 NOTE — PROGRESS NOTES
Infusion Nursing Note:  Floirnda Keller presents today for C1D1 Taxol.    Patient seen by provider today: No   present during visit today: Not Applicable.    Note: Pt was seen by Dr. Tariq on 4/24/25 and denies any changes or new concerns since then.    Given previous Taxol reaction, Taxol administered at the following rate:    300ml/hr for 12ml   50ml/hr x 10 min  100ml/hr x 5 min  150ml/hr x 5 min  200ml/hr x 5 min  250ml/hr x 5 min  301ml/hr remainder     Pt tolerated infusion well. Will try without titration next time.     Intravenous Access:  Implanted Port.    Treatment Conditions:  Lab Results   Component Value Date    HGB 13.4 04/27/2025    WBC 4.4 04/27/2025    ANEU 2.2 04/27/2025     04/27/2025        Results reviewed, labs MET treatment parameters, ok to proceed with treatment.      Post Infusion Assessment:  Patient tolerated infusion without incident.  Blood return noted pre and post infusion.  Site patent and intact, free from redness, edema or discomfort.  No evidence of extravasations.  Access discontinued per protocol.       Discharge Plan:   Discharge instructions reviewed with: Patient.  Patient and/or family verbalized understanding of discharge instructions and all questions answered.  AVS to patient via [employer].  Patient will return 5/5/25 for next appointment.   Patient discharged in stable condition accompanied by: self.  Departure Mode: Ambulatory.      Latrell Escalante RN

## 2025-04-29 DIAGNOSIS — C50.812 MALIGNANT NEOPLASM OF OVERLAPPING SITES OF LEFT BREAST IN FEMALE, ESTROGEN RECEPTOR POSITIVE (H): Primary | ICD-10-CM

## 2025-04-29 DIAGNOSIS — Z17.0 MALIGNANT NEOPLASM OF OVERLAPPING SITES OF LEFT BREAST IN FEMALE, ESTROGEN RECEPTOR POSITIVE (H): Primary | ICD-10-CM

## 2025-05-01 RX ORDER — ALBUTEROL SULFATE 0.83 MG/ML
2.5 SOLUTION RESPIRATORY (INHALATION)
OUTPATIENT
Start: 2025-05-05

## 2025-05-01 RX ORDER — DIPHENHYDRAMINE HYDROCHLORIDE 50 MG/ML
25 INJECTION, SOLUTION INTRAMUSCULAR; INTRAVENOUS
Start: 2025-05-05

## 2025-05-01 RX ORDER — EPINEPHRINE 1 MG/ML
0.3 INJECTION, SOLUTION, CONCENTRATE INTRAVENOUS EVERY 5 MIN PRN
OUTPATIENT
Start: 2025-05-05

## 2025-05-01 RX ORDER — DIPHENHYDRAMINE HYDROCHLORIDE 50 MG/ML
50 INJECTION, SOLUTION INTRAMUSCULAR; INTRAVENOUS
Start: 2025-05-05

## 2025-05-01 RX ORDER — ALBUTEROL SULFATE 90 UG/1
1-2 INHALANT RESPIRATORY (INHALATION)
Start: 2025-05-05

## 2025-05-01 RX ORDER — LORAZEPAM 2 MG/ML
0.5 INJECTION INTRAMUSCULAR EVERY 4 HOURS PRN
OUTPATIENT
Start: 2025-05-05

## 2025-05-01 RX ORDER — HEPARIN SODIUM,PORCINE 10 UNIT/ML
5-20 VIAL (ML) INTRAVENOUS DAILY PRN
OUTPATIENT
Start: 2025-05-05

## 2025-05-01 RX ORDER — ONDANSETRON 2 MG/ML
8 INJECTION INTRAMUSCULAR; INTRAVENOUS ONCE
OUTPATIENT
Start: 2025-05-05

## 2025-05-01 RX ORDER — HEPARIN SODIUM (PORCINE) LOCK FLUSH IV SOLN 100 UNIT/ML 100 UNIT/ML
5 SOLUTION INTRAVENOUS
OUTPATIENT
Start: 2025-05-05

## 2025-05-01 RX ORDER — MEPERIDINE HYDROCHLORIDE 25 MG/ML
25 INJECTION INTRAMUSCULAR; INTRAVENOUS; SUBCUTANEOUS
OUTPATIENT
Start: 2025-05-05

## 2025-05-01 RX ORDER — DIPHENHYDRAMINE HCL 25 MG
50 CAPSULE ORAL
Start: 2025-05-05

## 2025-05-04 ENCOUNTER — INFUSION THERAPY VISIT (OUTPATIENT)
Dept: INFUSION THERAPY | Facility: CLINIC | Age: 45
End: 2025-05-04
Attending: INTERNAL MEDICINE
Payer: COMMERCIAL

## 2025-05-04 DIAGNOSIS — Z17.0 MALIGNANT NEOPLASM OF OVERLAPPING SITES OF LEFT BREAST IN FEMALE, ESTROGEN RECEPTOR POSITIVE (H): Primary | ICD-10-CM

## 2025-05-04 DIAGNOSIS — C50.812 MALIGNANT NEOPLASM OF OVERLAPPING SITES OF LEFT BREAST IN FEMALE, ESTROGEN RECEPTOR POSITIVE (H): Primary | ICD-10-CM

## 2025-05-04 LAB
BASOPHILS # BLD AUTO: 0 10E3/UL (ref 0–0.2)
BASOPHILS NFR BLD AUTO: 1 %
CANCER AG15-3 SERPL-ACNC: 684 U/ML
EOSINOPHIL # BLD AUTO: 0 10E3/UL (ref 0–0.7)
EOSINOPHIL NFR BLD AUTO: 1 %
ERYTHROCYTE [DISTWIDTH] IN BLOOD BY AUTOMATED COUNT: 13.2 % (ref 10–15)
HCT VFR BLD AUTO: 37.6 % (ref 35–47)
HGB BLD-MCNC: 12.9 G/DL (ref 11.7–15.7)
IMM GRANULOCYTES # BLD: 0 10E3/UL
IMM GRANULOCYTES NFR BLD: 1 %
LYMPHOCYTES # BLD AUTO: 1.1 10E3/UL (ref 0.8–5.3)
LYMPHOCYTES NFR BLD AUTO: 33 %
MCH RBC QN AUTO: 30.3 PG (ref 26.5–33)
MCHC RBC AUTO-ENTMCNC: 34.3 G/DL (ref 31.5–36.5)
MCV RBC AUTO: 88 FL (ref 78–100)
MONOCYTES # BLD AUTO: 0.2 10E3/UL (ref 0–1.3)
MONOCYTES NFR BLD AUTO: 6 %
NEUTROPHILS # BLD AUTO: 2 10E3/UL (ref 1.6–8.3)
NEUTROPHILS NFR BLD AUTO: 59 %
NRBC # BLD AUTO: 0 10E3/UL
NRBC BLD AUTO-RTO: 0 /100
PLATELET # BLD AUTO: 257 10E3/UL (ref 150–450)
RBC # BLD AUTO: 4.26 10E6/UL (ref 3.8–5.2)
WBC # BLD AUTO: 3.4 10E3/UL (ref 4–11)

## 2025-05-04 PROCEDURE — 36415 COLL VENOUS BLD VENIPUNCTURE: CPT | Performed by: INTERNAL MEDICINE

## 2025-05-04 PROCEDURE — 85025 COMPLETE CBC W/AUTO DIFF WBC: CPT | Performed by: INTERNAL MEDICINE

## 2025-05-04 PROCEDURE — 86300 IMMUNOASSAY TUMOR CA 15-3: CPT | Performed by: INTERNAL MEDICINE

## 2025-05-04 NOTE — PROGRESS NOTES
Nursing Note:  Florinda ARELLANO Krishna presents today for port labs.    Patient seen by provider today: No   present during visit today: Not Applicable.    Note: N/A.    Intravenous Access:  Lab draw site right ac, Needle type butterfly, Gauge 23.  Labs drawn without difficulty.    Discharge Plan:   Patient was sent to [facility] for future appointment.    Alexia Hager RN

## 2025-05-05 ENCOUNTER — INFUSION THERAPY VISIT (OUTPATIENT)
Dept: INFUSION THERAPY | Facility: CLINIC | Age: 45
End: 2025-05-05
Attending: INTERNAL MEDICINE
Payer: COMMERCIAL

## 2025-05-05 VITALS
SYSTOLIC BLOOD PRESSURE: 121 MMHG | WEIGHT: 187 LBS | TEMPERATURE: 99 F | HEART RATE: 81 BPM | BODY MASS INDEX: 28.43 KG/M2 | DIASTOLIC BLOOD PRESSURE: 73 MMHG | OXYGEN SATURATION: 98 %

## 2025-05-05 DIAGNOSIS — C50.812 MALIGNANT NEOPLASM OF OVERLAPPING SITES OF LEFT BREAST IN FEMALE, ESTROGEN RECEPTOR POSITIVE (H): Primary | ICD-10-CM

## 2025-05-05 DIAGNOSIS — Z17.0 MALIGNANT NEOPLASM OF OVERLAPPING SITES OF LEFT BREAST IN FEMALE, ESTROGEN RECEPTOR POSITIVE (H): Primary | ICD-10-CM

## 2025-05-05 PROCEDURE — 250N000011 HC RX IP 250 OP 636: Performed by: INTERNAL MEDICINE

## 2025-05-05 PROCEDURE — 250N000013 HC RX MED GY IP 250 OP 250 PS 637: Performed by: INTERNAL MEDICINE

## 2025-05-05 PROCEDURE — 96413 CHEMO IV INFUSION 1 HR: CPT

## 2025-05-05 PROCEDURE — 258N000003 HC RX IP 258 OP 636: Performed by: INTERNAL MEDICINE

## 2025-05-05 PROCEDURE — 96375 TX/PRO/DX INJ NEW DRUG ADDON: CPT

## 2025-05-05 RX ORDER — DIPHENHYDRAMINE HCL 25 MG
50 CAPSULE ORAL
Status: DISCONTINUED | OUTPATIENT
Start: 2025-05-05 | End: 2025-05-05 | Stop reason: HOSPADM

## 2025-05-05 RX ORDER — HEPARIN SODIUM (PORCINE) LOCK FLUSH IV SOLN 100 UNIT/ML 100 UNIT/ML
5 SOLUTION INTRAVENOUS
Status: DISCONTINUED | OUTPATIENT
Start: 2025-05-05 | End: 2025-05-05 | Stop reason: HOSPADM

## 2025-05-05 RX ORDER — HEPARIN SODIUM,PORCINE 10 UNIT/ML
5-20 VIAL (ML) INTRAVENOUS DAILY PRN
Status: DISCONTINUED | OUTPATIENT
Start: 2025-05-05 | End: 2025-05-05 | Stop reason: HOSPADM

## 2025-05-05 RX ORDER — ONDANSETRON 2 MG/ML
8 INJECTION INTRAMUSCULAR; INTRAVENOUS ONCE
Status: COMPLETED | OUTPATIENT
Start: 2025-05-05 | End: 2025-05-05

## 2025-05-05 RX ADMIN — ONDANSETRON 8 MG: 2 INJECTION INTRAMUSCULAR; INTRAVENOUS at 09:40

## 2025-05-05 RX ADMIN — PACLITAXEL 161 MG: 6 INJECTION, SOLUTION INTRAVENOUS at 09:48

## 2025-05-05 RX ADMIN — DEXAMETHASONE SODIUM PHOSPHATE 20 MG: 10 INJECTION, SOLUTION INTRAMUSCULAR; INTRAVENOUS at 09:22

## 2025-05-05 RX ADMIN — Medication 5 ML: at 11:10

## 2025-05-05 RX ADMIN — DIPHENHYDRAMINE HYDROCHLORIDE 50 MG: 25 CAPSULE ORAL at 09:14

## 2025-05-05 RX ADMIN — FAMOTIDINE 20 MG: 10 INJECTION, SOLUTION INTRAVENOUS at 09:43

## 2025-05-05 RX ADMIN — SODIUM CHLORIDE 250 ML: 0.9 INJECTION, SOLUTION INTRAVENOUS at 09:22

## 2025-05-05 ASSESSMENT — PAIN SCALES - GENERAL: PAINLEVEL_OUTOF10: NO PAIN (0)

## 2025-05-05 NOTE — PROGRESS NOTES
Infusion Nursing Note:  Florinda Keller presents today for taxol.    Patient seen by provider today: No   present during visit today: Not Applicable.    Note: pt denies any changes in health or new concerns.      Intravenous Access:  Implanted Port.    Treatment Conditions:  Lab Results   Component Value Date    HGB 12.9 05/04/2025    WBC 3.4 (L) 05/04/2025    ANEU 2.0 05/04/2025     05/04/2025        Lab Results   Component Value Date     03/10/2025    POTASSIUM 4.3 03/10/2025    CR 0.65 03/10/2025    SJ 9.2 03/10/2025    BILITOTAL 0.7 04/20/2025    ALBUMIN 4.3 04/20/2025    ALT 31 04/20/2025    AST 19 04/20/2025       Results reviewed, labs MET treatment parameters, ok to proceed with treatment.      Post Infusion Assessment:  Patient tolerated infusion without incident.  Site patent and intact, free from redness, edema or discomfort.  No evidence of extravasations.  Access discontinued per protocol.       Discharge Plan:   Patient and/or family verbalized understanding of discharge instructions and all questions answered.  AVS to patient via [employer].  Patient will return 5/12 for next appointment.   Patient discharged in stable condition accompanied by: friend.  Departure Mode: Ambulatory.      Neha Loredo RN

## 2025-05-06 NOTE — PROGRESS NOTES
[facility]    Hematology/Oncology Established Patient Note      Today's Date: 5/12/2025    Reason for visit: Left breast cancer.    HISTORY OF PRESENT ILLNESS: Florinda Keller is a 44 year old female who presents with the following oncologic history:  1.  1/22/2025: Mammogram at [facility] obtained for increased firmness and swelling of left breast over course of 1 month.  Imaging showed at least a 4.1 cm asymmetry in the left upper outer breast and slight thickening of the left breast.  Ultrasound of left breast showed vague, irregular hypoechoic mass at 1:00, 9 cm from nipple measuring at least 4.3 x 3.8 x 3.1 cm.  No suspicious lymph nodes within left axilla.  2.  1/31/2025: Ultrasound-guided left breast core biopsy showed grade 2 invasive lobular carcinoma, angiolymphatic invasion absent, associated LCIS present, ER moderate positive at 95%, HI strongly positive at 98%, HER2 negative with IHC = 1+, Ki-67 = 17%.  MammaPrint high risk-1, luminal B, 6% absolute chemo benefit.  3.  2/20/2025: Breast MRI at [facility] showed abnormal non-mass type enhancement measuring 12 x 6 x 11 cm occupying most of anterior mid and lateral LEFT breast with tethering of underlying pectoralis major muscle but no suspicious enhancement within the  muscle itself; additional mass at 3:00 posteriorly on the left appears to represent an intramammary lymph node; mildly enlarged lymph node in the low LEFT axilla was biopsied on 3/11/2025; Sscattered foci of enhancement within the RIGHT breast with the most  discrete of these is a mass measuring up to 1.0 cm in maximal dimension in the central core, anterior to middle depth.  4. 2/25/2025: CT C/A/P and NM bone scan: reportedly negative for metastatic disease. 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  5. 3/11/2025: Left axillary lymph node biopsy positive for metastatic lobular carcinoma of breast, ER positive at 90%, HI positive at 90%, HER-2 negative with IHC = 1+.  6.  Patient prefers to have Missael present for discharge.   3/18/2025: Right breast biopsy at 8:00 negative for malignancy.  7. 3/21/2025: Cerianna PET scan showed Left breast mass 9.6 cm with mild PET Cerianna uptake consistent with known ER+ positive breast cancer; 12 mm left axillary lymph node with mild PET Cerianna uptake consistent with metastasis. Several additional smaller left axillary and subpectoral lymph nodes do not take up PET Cerianna and are indeterminate.  8. 3/31/2025: Started neoadjuvant paclitaxel weekly.    INTERVAL HISTORY:  Florinda reports more constant left shoulder pain and notes she had prior pain at this site in  that had subsided for several years but has now returned.  She has had some left sided hip pain.     REVIEW OF SYSTEMS:   14 point ROS was reviewed and is negative other than as noted above in HPI.       HOME MEDICATIONS:  Current Outpatient Medications   Medication Sig Dispense Refill    levothyroxine (SYNTHROID/LEVOTHROID) 88 MCG tablet Take 88 mcg by mouth daily      lidocaine-prilocaine (EMLA) 2.5-2.5 % external cream Apply to port site at least 15-30 min prior to port access 30 g 3    prochlorperazine (COMPAZINE) 10 MG tablet Take 1 tablet (10 mg) by mouth every 6 hours as needed for nausea or vomiting. (Patient not taking: Reported on 2025) 30 tablet 2         ALLERGIES:  Allergies   Allergen Reactions    Animal Dander Cough, Difficulty breathing and Itching    Cat Dander Cough, Difficulty breathing and Itching    Latex     Morphine Nausea and Vomiting    Other (Do Not Use) Nausea and Vomiting     *Darvocet A500         PAST MEDICAL HISTORY:  Past Medical History:   Diagnosis Date    Breast mass, left     Hx of anxiety     Hx of ovarian cyst     Hx of panic disorder     Hypothyroidism     Invasive ductal carcinoma of breast (H)     Neurological disorder      Gynecologic history:  Age of menarche at 13, , age of first pregnancy at 41, breast-fed her child.  Used OCPs for 26 years.  No HRT.    PAST SURGICAL HISTORY:  Past  "Surgical History:   Procedure Laterality Date    INSERT PORT VASCULAR ACCESS Right 3/28/2025    Procedure: INSERTION, VASCULAR ACCESS PORT;  Surgeon: Malik Maynard MD;  Location: [facility] OR    SKIN BIOPSY      SOFT TISSUE SURGERY Left     hand cyst    THYROIDECTOMY, PARTIAL      WISDOM TOOTH EXTRACTION           SOCIAL HISTORY:  Social History     Socioeconomic History    Marital status:      Spouse name: Not on file    Number of children: Not on file    Years of education: Not on file    Highest education level: Not on file   Occupational History    Not on file   Tobacco Use    Smoking status: Never    Smokeless tobacco: Never   Substance and Sexual Activity    Alcohol use: Not on file    Drug use: Never    Sexual activity: Yes     Partners: Male     Birth control/protection: None   Other Topics Concern    Not on file   Social History Narrative    Not on file     Social Drivers of Health     Financial Resource Strain: Not on file   Food Insecurity: Not on file   Transportation Needs: Not on file   Physical Activity: Not on file   Stress: Not on file   Social Connections: Not on file   Interpersonal Safety: Low Risk  (3/28/2025)    Interpersonal Safety     Do you feel physically and emotionally safe where you currently live?: Yes     Within the past 12 months, have you been hit, slapped, kicked or otherwise physically hurt by someone?: No     Within the past 12 months, have you been humiliated or emotionally abused in other ways by your partner or ex-partner?: Not on file   Housing Stability: Not on file   Has 3 year-old child with  Mahogany Neely works for [employer] as a .    FAMILY HISTORY:  Family history significant for mother with breast cancer at age 72.  Paternal grandmother with breast cancer age 50's and ovarian cancer at age 85. Maternal aunt with lung cancer.    PHYSICAL EXAM:  Vital signs:  /74   Pulse 66   Temp 99  F (37.2  C) (Oral)   Resp 16   Ht 1.727 m (5' 8\") "   Wt 83 kg (183 lb)   SpO2 100%   BMI 27.83 kg/m     ECO  GENERAL/CONSTITUTIONAL: No acute distress.  EYES:  No scleral icterus.  ENT/MOUTH: Neck supple.  LYMPH: Palpable left axillary adenopathy, slightly smaller than prior exam.   BREAST: Left breast higher and smaller than right breast.  Palpable large and firm mass over all of breast with further softening and wrinkling of skin compared to prior exam; breast is movable from posterior chest wall.  CARDIAC: Regular rate and rhythm with no murmurs.  RESPIRATORY: Clear bilaterally.  GASTROINTESTINAL: No guarding.  No distention.  MUSCULOSKELETAL: Warm and well-perfused, no cyanosis, clubbing, or edema.  NEUROLOGIC: No focal motor deficits. Alert, oriented, answers questions appropriately.  INTEGUMENTARY: No rashes or jaundice.  GAIT: Steady, does not use assistive device      LABS:  CBC RESULTS:   Recent Labs   Lab Test 25  0809   WBC 6.7   RBC 4.20   HGB 12.9   HCT 37.8   MCV 90   MCH 30.7   MCHC 34.1   RDW 13.1         Last Comprehensive Metabolic Panel:  Sodium   Date Value Ref Range Status   03/10/2025 137 135 - 145 mmol/L Final     Potassium   Date Value Ref Range Status   03/10/2025 4.3 3.4 - 5.3 mmol/L Final     Chloride   Date Value Ref Range Status   03/10/2025 101 98 - 107 mmol/L Final     Carbon Dioxide (CO2)   Date Value Ref Range Status   03/10/2025 23 22 - 29 mmol/L Final     Anion Gap   Date Value Ref Range Status   03/10/2025 13 7 - 15 mmol/L Final     Glucose   Date Value Ref Range Status   03/10/2025 93 70 - 99 mg/dL Final     Urea Nitrogen   Date Value Ref Range Status   03/10/2025 6.5 6.0 - 20.0 mg/dL Final     Creatinine   Date Value Ref Range Status   03/10/2025 0.65 0.51 - 0.95 mg/dL Final     GFR Estimate   Date Value Ref Range Status   03/10/2025 >90 >60 mL/min/1.73m2 Final     Comment:     eGFR calculated using  CKD-EPI equation.     Calcium   Date Value Ref Range Status   03/10/2025 9.2 8.8 - 10.4 mg/dL Final      Bilirubin Total   Date Value Ref Range Status   04/20/2025 0.7 <=1.2 mg/dL Final     Alkaline Phosphatase   Date Value Ref Range Status   04/20/2025 60 40 - 150 U/L Final     ALT   Date Value Ref Range Status   04/20/2025 31 0 - 50 U/L Final     AST   Date Value Ref Range Status   04/20/2025 19 0 - 45 U/L Final     CA 15-3 = 684     PATHOLOGY:  Reviewed as per HPI.    IMAGING:  Reviewed as per HPI.    ASSESSMENT/PLAN:  Florinda Keller is a 44 year old female with the following issues:  1.  Clinical prognostic stage IIIA, cT3-N1-MX, grade 2 invasive lobular carcinoma of the left upper outer breast, ER positive, SC positive, HER2 negative, MammaPrint high risk, Ki-67 = 17%  - 3/21/2025 Cerianna PET scan showed uptake within the known large left breast invasive lobular carcinoma, left axillary node, but no other site and no distant metastatic disease.  --Given the very large size of this tumor, she proceeded with attempted downstaging with neoadjuvant chemotherapy.  - Started weekly paclitaxel on 3/31/2025 for planned 12 weeks followed by dose dense Adriamycin and Cytoxan given high risk Mammaprint result. She would then proceed to mastectomy under care of Dr. Nancy Shaikh.  --Will omit the 12-week interval breast MRI as she will need ddAC for her high risk disease.  --Clinically, she has further reduction in size and softening of the primary breast tumor.  -She will need adjuvant radiation therapy as well as adjuvant endocrine therapy with ovarian suppression therapy with Zoladex injections every 4 weeks in conjunction with an aromatase inhibitor such as anastrozole.  --3/10/2025 CA 15-3 was elevated at 990, now improved to 684 on 5/4/2025.  Although NCCN guidelines do not recommend following tumor markers for routine surveillance, following her CA 15-3 will be helpful in her specific situation for treatment decision making due to the concern for additional micrometastatic disease.  --Continue with weekly  paclitaxel.    2.  Fertility discussion  -She has one child, age 5 and will not be planning any more children.    3. Left shoulder pain and left hip pain  --More constant now.  Possible due to radiating pain from left breast mass but indeterminate.    --Re-reviewed Cerianna PET did not show any bone metastases or overt abnormalities in the musculature.  --Will continue to monitor.    Racquel Tariq MD  [facility] Hematology/Oncology    Total time spent today: 40 minutes in chart review, patient evaluation, counseling, documentation, test and/or medication/prescription orders, and coordination of care.      The longitudinal plan of care for the diagnosis(es)/condition(s) as documented were addressed during this visit. Due to the added complexity in care, I will continue to support Florinda in the subsequent management and with ongoing continuity of care.

## 2025-05-12 ENCOUNTER — ONCOLOGY VISIT (OUTPATIENT)
Dept: ONCOLOGY | Facility: CLINIC | Age: 45
End: 2025-05-12
Attending: INTERNAL MEDICINE
Payer: COMMERCIAL

## 2025-05-12 ENCOUNTER — TELEPHONE (OUTPATIENT)
Dept: ONCOLOGY | Facility: CLINIC | Age: 45
End: 2025-05-12

## 2025-05-12 ENCOUNTER — DOCUMENTATION ONLY (OUTPATIENT)
Dept: ONCOLOGY | Facility: CLINIC | Age: 45
End: 2025-05-12

## 2025-05-12 ENCOUNTER — INFUSION THERAPY VISIT (OUTPATIENT)
Dept: INFUSION THERAPY | Facility: CLINIC | Age: 45
End: 2025-05-12
Attending: INTERNAL MEDICINE
Payer: COMMERCIAL

## 2025-05-12 ENCOUNTER — ALLIED HEALTH/NURSE VISIT (OUTPATIENT)
Dept: ONCOLOGY | Facility: CLINIC | Age: 45
End: 2025-05-12

## 2025-05-12 VITALS
RESPIRATION RATE: 16 BRPM | TEMPERATURE: 99 F | WEIGHT: 183 LBS | SYSTOLIC BLOOD PRESSURE: 109 MMHG | HEART RATE: 66 BPM | OXYGEN SATURATION: 100 % | DIASTOLIC BLOOD PRESSURE: 74 MMHG | BODY MASS INDEX: 27.74 KG/M2 | HEIGHT: 68 IN

## 2025-05-12 DIAGNOSIS — C50.812 MALIGNANT NEOPLASM OF OVERLAPPING SITES OF LEFT BREAST IN FEMALE, ESTROGEN RECEPTOR POSITIVE (H): Primary | ICD-10-CM

## 2025-05-12 DIAGNOSIS — Z17.0 MALIGNANT NEOPLASM OF OVERLAPPING SITES OF LEFT BREAST IN FEMALE, ESTROGEN RECEPTOR POSITIVE (H): Primary | ICD-10-CM

## 2025-05-12 DIAGNOSIS — C77.3 BREAST CANCER METASTASIZED TO AXILLARY LYMPH NODE, LEFT (H): ICD-10-CM

## 2025-05-12 DIAGNOSIS — C50.912 BREAST CANCER METASTASIZED TO AXILLARY LYMPH NODE, LEFT (H): ICD-10-CM

## 2025-05-12 DIAGNOSIS — M25.552 HIP PAIN, LEFT: ICD-10-CM

## 2025-05-12 DIAGNOSIS — Z00.6 EXAM FOR CLINICAL TRIAL: Primary | ICD-10-CM

## 2025-05-12 DIAGNOSIS — M25.512 LEFT SHOULDER PAIN, UNSPECIFIED CHRONICITY: ICD-10-CM

## 2025-05-12 LAB
BASOPHILS # BLD AUTO: 0 10E3/UL (ref 0–0.2)
BASOPHILS NFR BLD AUTO: 1 %
EOSINOPHIL # BLD AUTO: 0.1 10E3/UL (ref 0–0.7)
EOSINOPHIL NFR BLD AUTO: 1 %
ERYTHROCYTE [DISTWIDTH] IN BLOOD BY AUTOMATED COUNT: 13.1 % (ref 10–15)
HCT VFR BLD AUTO: 37.8 % (ref 35–47)
HGB BLD-MCNC: 12.9 G/DL (ref 11.7–15.7)
IMM GRANULOCYTES # BLD: 0 10E3/UL
IMM GRANULOCYTES NFR BLD: 0 %
LYMPHOCYTES # BLD AUTO: 2 10E3/UL (ref 0.8–5.3)
LYMPHOCYTES NFR BLD AUTO: 31 %
MCH RBC QN AUTO: 30.7 PG (ref 26.5–33)
MCHC RBC AUTO-ENTMCNC: 34.1 G/DL (ref 31.5–36.5)
MCV RBC AUTO: 90 FL (ref 78–100)
MONOCYTES # BLD AUTO: 0.4 10E3/UL (ref 0–1.3)
MONOCYTES NFR BLD AUTO: 6 %
NEUTROPHILS # BLD AUTO: 4.1 10E3/UL (ref 1.6–8.3)
NEUTROPHILS NFR BLD AUTO: 62 %
NRBC # BLD AUTO: 0 10E3/UL
NRBC BLD AUTO-RTO: 0 /100
PLATELET # BLD AUTO: 272 10E3/UL (ref 150–450)
RBC # BLD AUTO: 4.2 10E6/UL (ref 3.8–5.2)
WBC # BLD AUTO: 6.7 10E3/UL (ref 4–11)

## 2025-05-12 PROCEDURE — G2211 COMPLEX E/M VISIT ADD ON: HCPCS | Performed by: INTERNAL MEDICINE

## 2025-05-12 PROCEDURE — 250N000013 HC RX MED GY IP 250 OP 250 PS 637: Performed by: INTERNAL MEDICINE

## 2025-05-12 PROCEDURE — 85025 COMPLETE CBC W/AUTO DIFF WBC: CPT | Performed by: INTERNAL MEDICINE

## 2025-05-12 PROCEDURE — 250N000011 HC RX IP 250 OP 636: Performed by: INTERNAL MEDICINE

## 2025-05-12 PROCEDURE — 99213 OFFICE O/P EST LOW 20 MIN: CPT | Performed by: INTERNAL MEDICINE

## 2025-05-12 PROCEDURE — 99215 OFFICE O/P EST HI 40 MIN: CPT | Performed by: INTERNAL MEDICINE

## 2025-05-12 PROCEDURE — 258N000003 HC RX IP 258 OP 636: Performed by: INTERNAL MEDICINE

## 2025-05-12 PROCEDURE — 96413 CHEMO IV INFUSION 1 HR: CPT

## 2025-05-12 PROCEDURE — 96375 TX/PRO/DX INJ NEW DRUG ADDON: CPT

## 2025-05-12 PROCEDURE — 36591 DRAW BLOOD OFF VENOUS DEVICE: CPT | Performed by: INTERNAL MEDICINE

## 2025-05-12 PROCEDURE — 96367 TX/PROPH/DG ADDL SEQ IV INF: CPT

## 2025-05-12 RX ORDER — ONDANSETRON 2 MG/ML
8 INJECTION INTRAMUSCULAR; INTRAVENOUS ONCE
Status: DISCONTINUED | OUTPATIENT
Start: 2025-05-12 | End: 2025-05-12

## 2025-05-12 RX ORDER — HEPARIN SODIUM (PORCINE) LOCK FLUSH IV SOLN 100 UNIT/ML 100 UNIT/ML
5 SOLUTION INTRAVENOUS
Status: DISCONTINUED | OUTPATIENT
Start: 2025-05-12 | End: 2025-05-12 | Stop reason: HOSPADM

## 2025-05-12 RX ORDER — DIPHENHYDRAMINE HCL 25 MG
50 CAPSULE ORAL
Status: DISCONTINUED | OUTPATIENT
Start: 2025-05-12 | End: 2025-05-12 | Stop reason: HOSPADM

## 2025-05-12 RX ADMIN — FAMOTIDINE 20 MG: 10 INJECTION, SOLUTION INTRAVENOUS at 09:30

## 2025-05-12 RX ADMIN — DIPHENHYDRAMINE HYDROCHLORIDE 50 MG: 25 CAPSULE ORAL at 09:25

## 2025-05-12 RX ADMIN — SODIUM CHLORIDE 250 ML: 0.9 INJECTION, SOLUTION INTRAVENOUS at 09:26

## 2025-05-12 RX ADMIN — DEXAMETHASONE SODIUM PHOSPHATE: 10 INJECTION, SOLUTION INTRAMUSCULAR; INTRAVENOUS at 09:33

## 2025-05-12 RX ADMIN — HEPARIN 5 ML: 100 SYRINGE at 12:14

## 2025-05-12 RX ADMIN — PACLITAXEL 161 MG: 6 INJECTION, SOLUTION INTRAVENOUS at 09:58

## 2025-05-12 ASSESSMENT — PAIN SCALES - GENERAL: PAINLEVEL_OUTOF10: MILD PAIN (2)

## 2025-05-12 NOTE — NURSING NOTE
Office note from 5/12/25 faxed to [facility] per faxed request.     Receipt confirmation via Right Fax.    Tabitha Sanchez RN

## 2025-05-12 NOTE — LETTER
5/12/2025      Florinda Keller  [address] [place] [address]      Dear Colleague,    Thank you for referring your patient, Florinda Keller, to the [facility]. Please see a copy of my visit note below.    [facility]    Hematology/Oncology Established Patient Note      Today's Date: 5/12/2025    Reason for visit: Left breast cancer.    HISTORY OF PRESENT ILLNESS: Florinda Keller is a 44 year old female who presents with the following oncologic history:  1.  1/22/2025: Mammogram at [facility] obtained for increased firmness and swelling of left breast over course of 1 month.  Imaging showed at least a 4.1 cm asymmetry in the left upper outer breast and slight thickening of the left breast.  Ultrasound of left breast showed vague, irregular hypoechoic mass at 1:00, 9 cm from nipple measuring at least 4.3 x 3.8 x 3.1 cm.  No suspicious lymph nodes within left axilla.  2.  1/31/2025: Ultrasound-guided left breast core biopsy showed grade 2 invasive lobular carcinoma, angiolymphatic invasion absent, associated LCIS present, ER moderate positive at 95%, TX strongly positive at 98%, HER2 negative with IHC = 1+, Ki-67 = 17%.  MammaPrint high risk-1, luminal B, 6% absolute chemo benefit.  3.  2/20/2025: Breast MRI at [facility] showed abnormal non-mass type enhancement measuring 12 x 6 x 11 cm occupying most of anterior mid and lateral LEFT breast with tethering of underlying pectoralis major muscle but no suspicious enhancement within the  muscle itself; additional mass at 3:00 posteriorly on the left appears to represent an intramammary lymph node; mildly enlarged lymph node in the low LEFT axilla was biopsied on 3/11/2025; Sscattered foci of enhancement within the RIGHT breast with the most  discrete of these is a mass measuring up to 1.0 cm in maximal dimension in the central core, anterior to middle depth.  4. 2/25/2025: CT C/A/P and NM bone scan: reportedly negative for  metastatic disease. 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  5. 3/11/2025: Left axillary lymph node biopsy positive for metastatic lobular carcinoma of breast, ER positive at 90%, SD positive at 90%, HER-2 negative with IHC = 1+.  6. 3/18/2025: Right breast biopsy at 8:00 negative for malignancy.  7. 3/21/2025: Cerianna PET scan showed Left breast mass 9.6 cm with mild PET Cerianna uptake consistent with known ER+ positive breast cancer; 12 mm left axillary lymph node with mild PET Cerianna uptake consistent with metastasis. Several additional smaller left axillary and subpectoral lymph nodes do not take up PET Cerianna and are indeterminate.  8. 3/31/2025: Started neoadjuvant paclitaxel weekly.    INTERVAL HISTORY:  Florinda reports more constant left shoulder pain and notes she had prior pain at this site in 2008 that had subsided for several years but has now returned.  She has had some left sided hip pain.     REVIEW OF SYSTEMS:   14 point ROS was reviewed and is negative other than as noted above in HPI.       HOME MEDICATIONS:  Current Outpatient Medications   Medication Sig Dispense Refill     levothyroxine (SYNTHROID/LEVOTHROID) 88 MCG tablet Take 88 mcg by mouth daily       lidocaine-prilocaine (EMLA) 2.5-2.5 % external cream Apply to port site at least 15-30 min prior to port access 30 g 3     prochlorperazine (COMPAZINE) 10 MG tablet Take 1 tablet (10 mg) by mouth every 6 hours as needed for nausea or vomiting. (Patient not taking: Reported on 4/24/2025) 30 tablet 2         ALLERGIES:  Allergies   Allergen Reactions     Animal Dander Cough, Difficulty breathing and Itching     Cat Dander Cough, Difficulty breathing and Itching     Latex      Morphine Nausea and Vomiting     Other (Do Not Use) Nausea and Vomiting     *Darvocet A500         PAST MEDICAL HISTORY:  Past Medical History:   Diagnosis Date     Breast mass, left      Hx of anxiety      Hx of ovarian cyst      Hx of panic disorder       Hypothyroidism      Invasive ductal carcinoma of breast (H)      Neurological disorder      Gynecologic history:  Age of menarche at 13, , age of first pregnancy at 41, breast-fed her child.  Used OCPs for 26 years.  No HRT.    PAST SURGICAL HISTORY:  Past Surgical History:   Procedure Laterality Date     INSERT PORT VASCULAR ACCESS Right 3/28/2025    Procedure: INSERTION, VASCULAR ACCESS PORT;  Surgeon: Malik Maynard MD;  Location: [facility] OR     SKIN BIOPSY       SOFT TISSUE SURGERY Left     hand cyst     THYROIDECTOMY, PARTIAL       WISDOM TOOTH EXTRACTION           SOCIAL HISTORY:  Social History     Socioeconomic History     Marital status:      Spouse name: Not on file     Number of children: Not on file     Years of education: Not on file     Highest education level: Not on file   Occupational History     Not on file   Tobacco Use     Smoking status: Never     Smokeless tobacco: Never   Substance and Sexual Activity     Alcohol use: Not on file     Drug use: Never     Sexual activity: Yes     Partners: Male     Birth control/protection: None   Other Topics Concern     Not on file   Social History Narrative     Not on file     Social Drivers of Health     Financial Resource Strain: Not on file   Food Insecurity: Not on file   Transportation Needs: Not on file   Physical Activity: Not on file   Stress: Not on file   Social Connections: Not on file   Interpersonal Safety: Low Risk  (3/28/2025)    Interpersonal Safety      Do you feel physically and emotionally safe where you currently live?: Yes      Within the past 12 months, have you been hit, slapped, kicked or otherwise physically hurt by someone?: No      Within the past 12 months, have you been humiliated or emotionally abused in other ways by your partner or ex-partner?: Not on file   Housing Stability: Not on file   Has 3 year-old child with  Mahogany Neely works for [employer] as a .    FAMILY HISTORY:  Family history  "significant for mother with breast cancer at age 72.  Paternal grandmother with breast cancer age 50's and ovarian cancer at age 85. Maternal aunt with lung cancer.    PHYSICAL EXAM:  Vital signs:  /74   Pulse 66   Temp 99  F (37.2  C) (Oral)   Resp 16   Ht 1.727 m (5' 8\")   Wt 83 kg (183 lb)   SpO2 100%   BMI 27.83 kg/m     ECO  GENERAL/CONSTITUTIONAL: No acute distress.  EYES:  No scleral icterus.  ENT/MOUTH: Neck supple.  LYMPH: Palpable left axillary adenopathy, slightly smaller than prior exam.   BREAST: Left breast higher and smaller than right breast.  Palpable large and firm mass over all of breast with further softening and wrinkling of skin compared to prior exam; breast is movable from posterior chest wall.  CARDIAC: Regular rate and rhythm with no murmurs.  RESPIRATORY: Clear bilaterally.  GASTROINTESTINAL: No guarding.  No distention.  MUSCULOSKELETAL: Warm and well-perfused, no cyanosis, clubbing, or edema.  NEUROLOGIC: No focal motor deficits. Alert, oriented, answers questions appropriately.  INTEGUMENTARY: No rashes or jaundice.  GAIT: Steady, does not use assistive device      LABS:  CBC RESULTS:   Recent Labs   Lab Test 25  0809   WBC 6.7   RBC 4.20   HGB 12.9   HCT 37.8   MCV 90   MCH 30.7   MCHC 34.1   RDW 13.1         Last Comprehensive Metabolic Panel:  Sodium   Date Value Ref Range Status   03/10/2025 137 135 - 145 mmol/L Final     Potassium   Date Value Ref Range Status   03/10/2025 4.3 3.4 - 5.3 mmol/L Final     Chloride   Date Value Ref Range Status   03/10/2025 101 98 - 107 mmol/L Final     Carbon Dioxide (CO2)   Date Value Ref Range Status   03/10/2025 23 22 - 29 mmol/L Final     Anion Gap   Date Value Ref Range Status   03/10/2025 13 7 - 15 mmol/L Final     Glucose   Date Value Ref Range Status   03/10/2025 93 70 - 99 mg/dL Final     Urea Nitrogen   Date Value Ref Range Status   03/10/2025 6.5 6.0 - 20.0 mg/dL Final     Creatinine   Date Value Ref Range " Status   03/10/2025 0.65 0.51 - 0.95 mg/dL Final     GFR Estimate   Date Value Ref Range Status   03/10/2025 >90 >60 mL/min/1.73m2 Final     Comment:     eGFR calculated using 2021 CKD-EPI equation.     Calcium   Date Value Ref Range Status   03/10/2025 9.2 8.8 - 10.4 mg/dL Final     Bilirubin Total   Date Value Ref Range Status   04/20/2025 0.7 <=1.2 mg/dL Final     Alkaline Phosphatase   Date Value Ref Range Status   04/20/2025 60 40 - 150 U/L Final     ALT   Date Value Ref Range Status   04/20/2025 31 0 - 50 U/L Final     AST   Date Value Ref Range Status   04/20/2025 19 0 - 45 U/L Final     CA 15-3 = 684     PATHOLOGY:  Reviewed as per HPI.    IMAGING:  Reviewed as per HPI.    ASSESSMENT/PLAN:  Florinda Keller is a 44 year old female with the following issues:  1.  Clinical prognostic stage IIIA, cT3-N1-MX, grade 2 invasive lobular carcinoma of the left upper outer breast, ER positive, [place] positive, HER2 negative, MammaPrint high risk, Ki-67 = 17%  - 3/21/2025 Cerianna PET scan showed uptake within the known large left breast invasive lobular carcinoma, left axillary node, but no other site and no distant metastatic disease.  --Given the very large size of this tumor, she proceeded with attempted downstaging with neoadjuvant chemotherapy.  - Started weekly paclitaxel on 3/31/2025 for planned 12 weeks followed by dose dense Adriamycin and Cytoxan given high risk Mammaprint result. She would then proceed to mastectomy under care of Dr. Nancy Shaikh.  --Will omit the 12-week interval breast MRI as she will need ddAC for her high risk disease.  --Clinically, she has further reduction in size and softening of the primary breast tumor.  -She will need adjuvant radiation therapy as well as adjuvant endocrine therapy with ovarian suppression therapy with Zoladex injections every 4 weeks in conjunction with an aromatase inhibitor such as anastrozole.  --3/10/2025 CA 15-3 was elevated at 990, now improved to 684 on  "5/4/2025.  Although NCCN guidelines do not recommend following tumor markers for routine surveillance, following her CA 15-3 will be helpful in her specific situation for treatment decision making due to the concern for additional micrometastatic disease.  --Continue with weekly paclitaxel.    2.  Fertility discussion  -She has one child, age 3 and will not be planning any more children.    3. Left shoulder pain and left hip pain  --More constant now.  Possible due to radiating pain from left breast mass but indeterminate.    --Re-reviewed Cerianna PET did not show any bone metastases or overt abnormalities in the musculature.  --Will continue to monitor.    Racquel Tariq MD  [facility] Hematology/Oncology    Total time spent today: 40 minutes in chart review, patient evaluation, counseling, documentation, test and/or medication/prescription orders, and coordination of care.      The longitudinal plan of care for the diagnosis(es)/condition(s) as documented were addressed during this visit. Due to the added complexity in care, I will continue to support Florinda in the subsequent management and with ongoing continuity of care.    Oncology Rooming Note    May 12, 2025 8:30 AM   Florinda Keller is a 44 year old female who presents for:    Chief Complaint   Patient presents with     Oncology Clinic Visit     Initial Vitals: /74   Pulse 66   Temp 99  F (37.2  C) (Oral)   Resp 16   Ht 1.727 m (5' 8\")   Wt 83 kg (183 lb)   SpO2 100%   BMI 27.83 kg/m   Estimated body mass index is 27.83 kg/m  as calculated from the following:    Height as of this encounter: 1.727 m (5' 8\").    Weight as of this encounter: 83 kg (183 lb). Body surface area is 2 meters squared.  Mild Pain (2) Comment: Data Unavailable   No LMP recorded.  Allergies reviewed: Yes  Medications reviewed: Yes    Medications: Medication refills not needed today.  Pharmacy name entered into [employer]: [facility] [id number] [place] [facility] [place] [id number] [address]    Frailty Screening:   Is the patient here for a new oncology consult visit in cancer care? 2. No    PHQ9:  Did this patient require a PHQ9?: No        Linsey Carver MA              Again, thank you for allowing me to participate in the care of your patient.        Sincerely,        Racquel Tariq MD    Electronically signed

## 2025-05-12 NOTE — PROGRESS NOTES
Nursing Note:  Florinda Keller presents today for Port Labs.    Patient seen by provider today: Yes: Dr. Tariq   present during visit today: Not Applicable.    Note: N/A.    Intravenous Access:  Labs drawn without difficulty.  Implanted Port.    Discharge Plan:   Patient was sent to [facility] for 8:40 AM appointment.    Klaus Magana RN

## 2025-05-12 NOTE — PROGRESS NOTES
"Oncology Rooming Note    May 12, 2025 8:30 AM   Florinda Keller is a 44 year old female who presents for:    Chief Complaint   Patient presents with    Oncology Clinic Visit     Initial Vitals: /74   Pulse 66   Temp 99  F (37.2  C) (Oral)   Resp 16   Ht 1.727 m (5' 8\")   Wt 83 kg (183 lb)   SpO2 100%   BMI 27.83 kg/m   Estimated body mass index is 27.83 kg/m  as calculated from the following:    Height as of this encounter: 1.727 m (5' 8\").    Weight as of this encounter: 83 kg (183 lb). Body surface area is 2 meters squared.  Mild Pain (2) Comment: Data Unavailable   No LMP recorded.  Allergies reviewed: Yes  Medications reviewed: Yes    Medications: Medication refills not needed today.  Pharmacy name entered into [employer]: [id number] [place] [address]    Frailty Screening:   Is the patient here for a new oncology consult visit in cancer care? 2. No    PHQ9:  Did this patient require a PHQ9?: No        Linsey Carver MA            "

## 2025-05-12 NOTE — PATIENT INSTRUCTIONS
1. Arrange lab draw and Adriamycin + Cytoxan + Neulasta Onpro every 2 weeks x 4 cycles from 6/23-8/4/2025.  2. RTC NP/MD visit every 2 weeks x 4 with above chemo.

## 2025-05-12 NOTE — PROGRESS NOTES
Infusion Nursing Note:  Florinda Keller presents today for C7D1 Taxol.    Patient seen by provider today: Yes: Dr. Tariq   present during visit today: Not Applicable.    Note: N/A.      Intravenous Access:  Implanted Port.    Treatment Conditions:  Lab Results   Component Value Date    HGB 12.9 05/12/2025    WBC 6.7 05/12/2025    ANEU 4.1 05/12/2025     05/12/2025        Results reviewed, labs MET treatment parameters, ok to proceed with treatment.      Post Infusion Assessment:  Patient tolerated infusion without incident.  Blood return noted pre and post infusion.  Site patent and intact, free from redness, edema or discomfort.  No evidence of extravasations.  Access discontinued per protocol.       Discharge Plan:   Discharge instructions reviewed with: Patient.  Patient and/or family verbalized understanding of discharge instructions and all questions answered.  Patient discharged in stable condition accompanied by: self.  Departure Mode: Ambulatory.      Elisabet Bourgeois RN

## 2025-05-15 RX ORDER — HEPARIN SODIUM (PORCINE) LOCK FLUSH IV SOLN 100 UNIT/ML 100 UNIT/ML
5 SOLUTION INTRAVENOUS
OUTPATIENT
Start: 2025-05-19

## 2025-05-15 RX ORDER — MEPERIDINE HYDROCHLORIDE 25 MG/ML
25 INJECTION INTRAMUSCULAR; INTRAVENOUS; SUBCUTANEOUS
OUTPATIENT
Start: 2025-05-19

## 2025-05-15 RX ORDER — DIPHENHYDRAMINE HYDROCHLORIDE 50 MG/ML
25 INJECTION, SOLUTION INTRAMUSCULAR; INTRAVENOUS
Start: 2025-05-19

## 2025-05-15 RX ORDER — HEPARIN SODIUM,PORCINE 10 UNIT/ML
5-20 VIAL (ML) INTRAVENOUS DAILY PRN
OUTPATIENT
Start: 2025-05-19

## 2025-05-15 RX ORDER — ALBUTEROL SULFATE 90 UG/1
1-2 INHALANT RESPIRATORY (INHALATION)
Start: 2025-05-19

## 2025-05-15 RX ORDER — ALBUTEROL SULFATE 0.83 MG/ML
2.5 SOLUTION RESPIRATORY (INHALATION)
OUTPATIENT
Start: 2025-05-19

## 2025-05-15 RX ORDER — EPINEPHRINE 1 MG/ML
0.3 INJECTION, SOLUTION, CONCENTRATE INTRAVENOUS EVERY 5 MIN PRN
OUTPATIENT
Start: 2025-05-19

## 2025-05-15 RX ORDER — DIPHENHYDRAMINE HCL 25 MG
50 CAPSULE ORAL
Start: 2025-05-19

## 2025-05-15 RX ORDER — LORAZEPAM 2 MG/ML
0.5 INJECTION INTRAMUSCULAR EVERY 4 HOURS PRN
OUTPATIENT
Start: 2025-05-19

## 2025-05-15 RX ORDER — DIPHENHYDRAMINE HYDROCHLORIDE 50 MG/ML
50 INJECTION, SOLUTION INTRAMUSCULAR; INTRAVENOUS
Start: 2025-05-19

## 2025-05-18 ENCOUNTER — INFUSION THERAPY VISIT (OUTPATIENT)
Dept: INFUSION THERAPY | Facility: CLINIC | Age: 45
End: 2025-05-18
Attending: INTERNAL MEDICINE
Payer: COMMERCIAL

## 2025-05-18 DIAGNOSIS — Z17.0 MALIGNANT NEOPLASM OF OVERLAPPING SITES OF LEFT BREAST IN FEMALE, ESTROGEN RECEPTOR POSITIVE (H): Primary | ICD-10-CM

## 2025-05-18 DIAGNOSIS — C50.812 MALIGNANT NEOPLASM OF OVERLAPPING SITES OF LEFT BREAST IN FEMALE, ESTROGEN RECEPTOR POSITIVE (H): Primary | ICD-10-CM

## 2025-05-18 LAB
ALBUMIN SERPL BCG-MCNC: 4.2 G/DL (ref 3.5–5.2)
ALP SERPL-CCNC: 67 U/L (ref 40–150)
ALT SERPL W P-5'-P-CCNC: 29 U/L (ref 0–50)
AST SERPL W P-5'-P-CCNC: 32 U/L (ref 0–45)
BASOPHILS # BLD AUTO: 0 10E3/UL (ref 0–0.2)
BASOPHILS NFR BLD AUTO: 1 %
BILIRUB DIRECT SERPL-MCNC: 0.22 MG/DL (ref 0–0.3)
BILIRUB SERPL-MCNC: 0.9 MG/DL
EOSINOPHIL # BLD AUTO: 0.1 10E3/UL (ref 0–0.7)
EOSINOPHIL NFR BLD AUTO: 2 %
ERYTHROCYTE [DISTWIDTH] IN BLOOD BY AUTOMATED COUNT: 13 % (ref 10–15)
HCT VFR BLD AUTO: 38.9 % (ref 35–47)
HGB BLD-MCNC: 12.8 G/DL (ref 11.7–15.7)
IMM GRANULOCYTES # BLD: 0 10E3/UL
IMM GRANULOCYTES NFR BLD: 1 %
LYMPHOCYTES # BLD AUTO: 1.7 10E3/UL (ref 0.8–5.3)
LYMPHOCYTES NFR BLD AUTO: 30 %
MCH RBC QN AUTO: 29.7 PG (ref 26.5–33)
MCHC RBC AUTO-ENTMCNC: 32.9 G/DL (ref 31.5–36.5)
MCV RBC AUTO: 90 FL (ref 78–100)
MONOCYTES # BLD AUTO: 0.2 10E3/UL (ref 0–1.3)
MONOCYTES NFR BLD AUTO: 4 %
NEUTROPHILS # BLD AUTO: 3.5 10E3/UL (ref 1.6–8.3)
NEUTROPHILS NFR BLD AUTO: 63 %
NRBC # BLD AUTO: 0 10E3/UL
NRBC BLD AUTO-RTO: 0 /100
PLATELET # BLD AUTO: 268 10E3/UL (ref 150–450)
PROT SERPL-MCNC: 6.9 G/DL (ref 6.4–8.3)
RBC # BLD AUTO: 4.31 10E6/UL (ref 3.8–5.2)
WBC # BLD AUTO: 5.5 10E3/UL (ref 4–11)

## 2025-05-18 PROCEDURE — 36415 COLL VENOUS BLD VENIPUNCTURE: CPT | Performed by: INTERNAL MEDICINE

## 2025-05-18 PROCEDURE — 80076 HEPATIC FUNCTION PANEL: CPT | Performed by: INTERNAL MEDICINE

## 2025-05-18 PROCEDURE — 85004 AUTOMATED DIFF WBC COUNT: CPT | Performed by: INTERNAL MEDICINE

## 2025-05-18 NOTE — PROGRESS NOTES
Infusion Nursing Note:  Florinda Keller presents today for port labs-drawn peripherally per patient request.    Patient seen by provider today: No   present during visit today: Not Applicable.    Note: N/A.    Intravenous Access:  Lab draw site right AC, Needle type butterfly, Gauge 23.  Labs drawn without difficulty.    Treatment Conditions:  Not Applicable. Labs pending at time of discharge.    Post Infusion Assessment:  Site patent and intact, free from redness, edema or discomfort.  No evidence of extravasations.  Access discontinued per protocol.     Discharge Plan:   Patient discharged in stable condition accompanied by: self.  Departure Mode: Ambulatory.      Afsaneh Malik RN

## 2025-05-19 ENCOUNTER — INFUSION THERAPY VISIT (OUTPATIENT)
Dept: INFUSION THERAPY | Facility: CLINIC | Age: 45
End: 2025-05-19
Attending: INTERNAL MEDICINE
Payer: COMMERCIAL

## 2025-05-19 VITALS
DIASTOLIC BLOOD PRESSURE: 79 MMHG | HEIGHT: 68 IN | OXYGEN SATURATION: 98 % | SYSTOLIC BLOOD PRESSURE: 118 MMHG | BODY MASS INDEX: 27.74 KG/M2 | RESPIRATION RATE: 16 BRPM | TEMPERATURE: 98 F | WEIGHT: 183 LBS | HEART RATE: 66 BPM

## 2025-05-19 DIAGNOSIS — Z17.0 MALIGNANT NEOPLASM OF OVERLAPPING SITES OF LEFT BREAST IN FEMALE, ESTROGEN RECEPTOR POSITIVE (H): Primary | ICD-10-CM

## 2025-05-19 DIAGNOSIS — C50.812 MALIGNANT NEOPLASM OF OVERLAPPING SITES OF LEFT BREAST IN FEMALE, ESTROGEN RECEPTOR POSITIVE (H): Primary | ICD-10-CM

## 2025-05-19 PROCEDURE — 250N000011 HC RX IP 250 OP 636: Performed by: INTERNAL MEDICINE

## 2025-05-19 PROCEDURE — 96413 CHEMO IV INFUSION 1 HR: CPT

## 2025-05-19 PROCEDURE — 250N000013 HC RX MED GY IP 250 OP 250 PS 637: Performed by: INTERNAL MEDICINE

## 2025-05-19 PROCEDURE — 258N000003 HC RX IP 258 OP 636: Performed by: INTERNAL MEDICINE

## 2025-05-19 PROCEDURE — 96375 TX/PRO/DX INJ NEW DRUG ADDON: CPT

## 2025-05-19 RX ORDER — DIPHENHYDRAMINE HCL 25 MG
50 CAPSULE ORAL
Status: DISCONTINUED | OUTPATIENT
Start: 2025-05-19 | End: 2025-05-19 | Stop reason: HOSPADM

## 2025-05-19 RX ORDER — HEPARIN SODIUM (PORCINE) LOCK FLUSH IV SOLN 100 UNIT/ML 100 UNIT/ML
5 SOLUTION INTRAVENOUS
Status: DISCONTINUED | OUTPATIENT
Start: 2025-05-19 | End: 2025-05-19 | Stop reason: HOSPADM

## 2025-05-19 RX ADMIN — DIPHENHYDRAMINE HYDROCHLORIDE 50 MG: 25 CAPSULE ORAL at 09:47

## 2025-05-19 RX ADMIN — DEXAMETHASONE SODIUM PHOSPHATE: 10 INJECTION, SOLUTION INTRAMUSCULAR; INTRAVENOUS at 09:58

## 2025-05-19 RX ADMIN — PACLITAXEL 161 MG: 6 INJECTION, SOLUTION INTRAVENOUS at 10:16

## 2025-05-19 RX ADMIN — Medication 5 ML: at 11:16

## 2025-05-19 RX ADMIN — SODIUM CHLORIDE 250 ML: 0.9 INJECTION, SOLUTION INTRAVENOUS at 09:53

## 2025-05-19 RX ADMIN — FAMOTIDINE 20 MG: 10 INJECTION, SOLUTION INTRAVENOUS at 09:54

## 2025-05-19 NOTE — PROGRESS NOTES
Infusion Nursing Note:  Florinda Keller presents today for C8D1 Taxol.    Patient seen by provider today: No   present during visit today: Not Applicable.    Note: Ice to hands and feet during Taxol infusion (pt uses own supply).      Intravenous Access:  Implanted Port.    Treatment Conditions:  Lab Results   Component Value Date    HGB 12.8 05/18/2025    WBC 5.5 05/18/2025    ANEU 3.5 05/18/2025     05/18/2025        Lab Results   Component Value Date     03/10/2025    POTASSIUM 4.3 03/10/2025    CR 0.65 03/10/2025    SJ 9.2 03/10/2025    BILITOTAL 0.9 05/18/2025    ALBUMIN 4.2 05/18/2025    ALT 29 05/18/2025    AST 32 05/18/2025       Results reviewed, labs MET treatment parameters, ok to proceed with treatment.      Post Infusion Assessment:  Patient tolerated infusion without incident.  Blood return noted pre and post infusion.  Site patent and intact, free from redness, edema or discomfort.  No evidence of extravasations.  Access discontinued per protocol.       Discharge Plan:   Discharge instructions reviewed with: Patient.  Patient and/or family verbalized understanding of discharge instructions and all questions answered.  AVS to patient via dscoutT.  Patient will return 5/27/25 for next appointment.   Patient discharged in stable condition accompanied by: friend.  Departure Mode: Ambulatory.      Grover Thomas RN

## 2025-05-26 RX ORDER — HEPARIN SODIUM,PORCINE 10 UNIT/ML
5-20 VIAL (ML) INTRAVENOUS DAILY PRN
Status: CANCELLED | OUTPATIENT
Start: 2025-05-26

## 2025-05-26 RX ORDER — EPINEPHRINE 1 MG/ML
0.3 INJECTION, SOLUTION, CONCENTRATE INTRAVENOUS EVERY 5 MIN PRN
Status: CANCELLED | OUTPATIENT
Start: 2025-05-26

## 2025-05-26 RX ORDER — ALBUTEROL SULFATE 0.83 MG/ML
2.5 SOLUTION RESPIRATORY (INHALATION)
Status: CANCELLED | OUTPATIENT
Start: 2025-05-26

## 2025-05-26 RX ORDER — DIPHENHYDRAMINE HCL 25 MG
50 CAPSULE ORAL
Status: CANCELLED
Start: 2025-05-26

## 2025-05-26 RX ORDER — MEPERIDINE HYDROCHLORIDE 25 MG/ML
25 INJECTION INTRAMUSCULAR; INTRAVENOUS; SUBCUTANEOUS
Status: CANCELLED | OUTPATIENT
Start: 2025-05-26

## 2025-05-26 RX ORDER — HEPARIN SODIUM (PORCINE) LOCK FLUSH IV SOLN 100 UNIT/ML 100 UNIT/ML
5 SOLUTION INTRAVENOUS
Status: CANCELLED | OUTPATIENT
Start: 2025-05-26

## 2025-05-26 RX ORDER — ALBUTEROL SULFATE 90 UG/1
1-2 INHALANT RESPIRATORY (INHALATION)
Status: CANCELLED
Start: 2025-05-26

## 2025-05-26 RX ORDER — DIPHENHYDRAMINE HYDROCHLORIDE 50 MG/ML
25 INJECTION, SOLUTION INTRAMUSCULAR; INTRAVENOUS
Status: CANCELLED
Start: 2025-05-26

## 2025-05-26 RX ORDER — LORAZEPAM 2 MG/ML
0.5 INJECTION INTRAMUSCULAR EVERY 4 HOURS PRN
Status: CANCELLED | OUTPATIENT
Start: 2025-05-26

## 2025-05-26 RX ORDER — DIPHENHYDRAMINE HYDROCHLORIDE 50 MG/ML
50 INJECTION, SOLUTION INTRAMUSCULAR; INTRAVENOUS
Status: CANCELLED
Start: 2025-05-26

## 2025-05-27 ENCOUNTER — INFUSION THERAPY VISIT (OUTPATIENT)
Dept: INFUSION THERAPY | Facility: CLINIC | Age: 45
End: 2025-05-27
Attending: INTERNAL MEDICINE
Payer: COMMERCIAL

## 2025-05-27 ENCOUNTER — ONCOLOGY VISIT (OUTPATIENT)
Dept: ONCOLOGY | Facility: CLINIC | Age: 45
End: 2025-05-27
Attending: INTERNAL MEDICINE
Payer: COMMERCIAL

## 2025-05-27 ENCOUNTER — PATIENT OUTREACH (OUTPATIENT)
Dept: CARE COORDINATION | Facility: CLINIC | Age: 45
End: 2025-05-27

## 2025-05-27 VITALS
TEMPERATURE: 98 F | HEART RATE: 81 BPM | OXYGEN SATURATION: 97 % | SYSTOLIC BLOOD PRESSURE: 107 MMHG | WEIGHT: 183 LBS | DIASTOLIC BLOOD PRESSURE: 57 MMHG | RESPIRATION RATE: 16 BRPM | BODY MASS INDEX: 27.83 KG/M2

## 2025-05-27 DIAGNOSIS — C50.812 MALIGNANT NEOPLASM OF OVERLAPPING SITES OF LEFT BREAST IN FEMALE, ESTROGEN RECEPTOR POSITIVE (H): Primary | ICD-10-CM

## 2025-05-27 DIAGNOSIS — Z71.89 COORDINATION OF COMPLEX CARE: Primary | ICD-10-CM

## 2025-05-27 DIAGNOSIS — Z17.0 MALIGNANT NEOPLASM OF OVERLAPPING SITES OF LEFT BREAST IN FEMALE, ESTROGEN RECEPTOR POSITIVE (H): Primary | ICD-10-CM

## 2025-05-27 DIAGNOSIS — T45.1X5A CHEMOTHERAPY-INDUCED NEUROPATHY: ICD-10-CM

## 2025-05-27 DIAGNOSIS — M79.602 PAIN OF LEFT UPPER EXTREMITY: ICD-10-CM

## 2025-05-27 DIAGNOSIS — G62.0 CHEMOTHERAPY-INDUCED NEUROPATHY: ICD-10-CM

## 2025-05-27 LAB
BASOPHILS # BLD AUTO: 0 10E3/UL (ref 0–0.2)
BASOPHILS NFR BLD AUTO: 1 %
EOSINOPHIL # BLD AUTO: 0.1 10E3/UL (ref 0–0.7)
EOSINOPHIL NFR BLD AUTO: 1 %
ERYTHROCYTE [DISTWIDTH] IN BLOOD BY AUTOMATED COUNT: 13.1 % (ref 10–15)
HCT VFR BLD AUTO: 37.5 % (ref 35–47)
HGB BLD-MCNC: 12.6 G/DL (ref 11.7–15.7)
IMM GRANULOCYTES # BLD: 0 10E3/UL
IMM GRANULOCYTES NFR BLD: 1 %
LYMPHOCYTES # BLD AUTO: 1.9 10E3/UL (ref 0.8–5.3)
LYMPHOCYTES NFR BLD AUTO: 34 %
MCH RBC QN AUTO: 29.7 PG (ref 26.5–33)
MCHC RBC AUTO-ENTMCNC: 33.6 G/DL (ref 31.5–36.5)
MCV RBC AUTO: 88 FL (ref 78–100)
MONOCYTES # BLD AUTO: 0.4 10E3/UL (ref 0–1.3)
MONOCYTES NFR BLD AUTO: 7 %
NEUTROPHILS # BLD AUTO: 3.2 10E3/UL (ref 1.6–8.3)
NEUTROPHILS NFR BLD AUTO: 57 %
NRBC # BLD AUTO: 0 10E3/UL
NRBC BLD AUTO-RTO: 0 /100
PLATELET # BLD AUTO: 303 10E3/UL (ref 150–450)
RBC # BLD AUTO: 4.24 10E6/UL (ref 3.8–5.2)
WBC # BLD AUTO: 5.6 10E3/UL (ref 4–11)

## 2025-05-27 PROCEDURE — 96367 TX/PROPH/DG ADDL SEQ IV INF: CPT

## 2025-05-27 PROCEDURE — 258N000003 HC RX IP 258 OP 636: Performed by: INTERNAL MEDICINE

## 2025-05-27 PROCEDURE — 96375 TX/PRO/DX INJ NEW DRUG ADDON: CPT

## 2025-05-27 PROCEDURE — 99214 OFFICE O/P EST MOD 30 MIN: CPT

## 2025-05-27 PROCEDURE — G2211 COMPLEX E/M VISIT ADD ON: HCPCS

## 2025-05-27 PROCEDURE — 85025 COMPLETE CBC W/AUTO DIFF WBC: CPT | Performed by: INTERNAL MEDICINE

## 2025-05-27 PROCEDURE — 99213 OFFICE O/P EST LOW 20 MIN: CPT

## 2025-05-27 PROCEDURE — 36415 COLL VENOUS BLD VENIPUNCTURE: CPT | Performed by: INTERNAL MEDICINE

## 2025-05-27 PROCEDURE — 96413 CHEMO IV INFUSION 1 HR: CPT

## 2025-05-27 PROCEDURE — 250N000013 HC RX MED GY IP 250 OP 250 PS 637: Performed by: INTERNAL MEDICINE

## 2025-05-27 PROCEDURE — 250N000011 HC RX IP 250 OP 636: Performed by: INTERNAL MEDICINE

## 2025-05-27 RX ORDER — DIPHENHYDRAMINE HCL 25 MG
50 CAPSULE ORAL
Status: DISCONTINUED | OUTPATIENT
Start: 2025-05-27 | End: 2025-05-27 | Stop reason: HOSPADM

## 2025-05-27 RX ORDER — HEPARIN SODIUM (PORCINE) LOCK FLUSH IV SOLN 100 UNIT/ML 100 UNIT/ML
5 SOLUTION INTRAVENOUS
Status: DISCONTINUED | OUTPATIENT
Start: 2025-05-27 | End: 2025-05-27 | Stop reason: HOSPADM

## 2025-05-27 RX ADMIN — SODIUM CHLORIDE 250 ML: 0.9 INJECTION, SOLUTION INTRAVENOUS at 09:52

## 2025-05-27 RX ADMIN — Medication 5 ML: at 11:23

## 2025-05-27 RX ADMIN — FAMOTIDINE 20 MG: 10 INJECTION, SOLUTION INTRAVENOUS at 09:53

## 2025-05-27 RX ADMIN — PACLITAXEL 161 MG: 6 INJECTION, SOLUTION INTRAVENOUS at 10:18

## 2025-05-27 RX ADMIN — DEXAMETHASONE SODIUM PHOSPHATE: 10 INJECTION, SOLUTION INTRAMUSCULAR; INTRAVENOUS at 09:53

## 2025-05-27 RX ADMIN — DIPHENHYDRAMINE HYDROCHLORIDE 50 MG: 25 CAPSULE ORAL at 09:51

## 2025-05-27 ASSESSMENT — PAIN SCALES - GENERAL: PAINLEVEL_OUTOF10: MODERATE PAIN (4)

## 2025-05-27 NOTE — PROGRESS NOTES
Nursing Note:  Florinda EILEEN Keller presents today for port labs.    Patient seen by provider today: Yes: BONITA Valles   present during visit today: Not Applicable.    Note: N/A.    Intravenous Access:  Labs drawn without difficulty.  Implanted Port.    Discharge Plan:   Patient was sent to [facility] for clinic and infusion appointment.    Zaria Peña RN

## 2025-05-27 NOTE — PROGRESS NOTES
Infusion Nursing Note:  Florinda Keller presents today for C9D1 Taxol.    Patient seen by provider today: Yes: GIULIA Valles   present during visit today: Not Applicable.    Note: Ice to hands and feet during Taxol.      Intravenous Access:  Implanted Port.    Treatment Conditions:  Lab Results   Component Value Date    HGB 12.6 05/27/2025    WBC 5.6 05/27/2025    ANEU 3.2 05/27/2025     05/27/2025        Lab Results   Component Value Date     03/10/2025    POTASSIUM 4.3 03/10/2025    CR 0.65 03/10/2025    SJ 9.2 03/10/2025    BILITOTAL 0.9 05/18/2025    ALBUMIN 4.2 05/18/2025    ALT 29 05/18/2025    AST 32 05/18/2025       Results reviewed, labs MET treatment parameters, ok to proceed with treatment.      Post Infusion Assessment:  Patient tolerated infusion without incident.  Blood return noted pre and post infusion.  Site patent and intact, free from redness, edema or discomfort.  No evidence of extravasations.  Access discontinued per protocol.       Discharge Plan:   AVS to patient via MYCHART.  Patient will return as prev sheyla for next appointment.   Patient discharged in stable condition accompanied by: friend.  Departure Mode: Ambulatory.      Jimmy Sevilla RN

## 2025-05-27 NOTE — LETTER
5/27/2025      Florinda Keller  [address] [place] [address]      Dear Colleague,    Thank you for referring your patient, Florinda Keller, to the [facility]. Please see a copy of my visit note below.    Oncology/Hematology Visit Note  5/27/2025      Reason for Visit: Follow-up- Left Breast Cancer  - ER positive, TX positive, HER2 negative, MammaPrint high risk, Ki-67 = 17%     Primary Oncologist: Dr. Tariq    Oncology HPI:   - 1/22/2025: Mammogram at [facility] obtained for increased firmness and swelling of left breast over course of 1 month.  Imaging showed at least a 4.1 cm asymmetry in the left upper outer breast and slight thickening of the left breast.  Ultrasound of left breast showed vague, irregular hypoechoic mass at 1:00, 9 cm from nipple measuring at least 4.3 x 3.8 x 3.1 cm.  No suspicious -.  1/31/2025: Ultrasound-guided left breast core biopsy showed grade 2 invasive lobular carcinoma, angiolymphatic invasion absent, associated LCIS present, ER moderate positive at 95%, TX strongly positive at 98%, HER2 negative with IHC = 1+, Ki-67 = 17%.  MammaPrint high risk-1, luminal B, 6% absolute chemo benefit.  -.  2/20/2025: Breast MRI at [facility] showed abnormal non-mass type enhancement measuring 12 x 6 x 11 cm occupying most of anterior mid and lateral LEFT breast with tethering of underlying pectoralis major muscle but no suspicious enhancement within the muscle itself; additional mass at 3:00 posteriorly on the left appears to represent an intramammary lymph node; mildly enlarged lymph node in the low LEFT axilla was biopsied on 3/11/2025; Sscattered foci of enhancement within the RIGHT breast with the most discrete of these is a mass measuring up to 1.0 cm in maximal dimension in the central core, anterior to middle depth.  - 2/25/2025: CT C/A/P and NM bone scan: reportedly negative for metastatic disease. 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  - 3/6/25: Echo LVEF  "55-60%  - 3/11/2025: Left axillary lymph node biopsy positive for metastatic lobular carcinoma of breast, ER positive at 90%, NY positive at 90%, HER-2 negative with IHC = 1+.  - 3/18/2025: Right breast biopsy at 8:00 negative for malignancy.  - 3/21/2025: Cerianna PET scan showed Left breast mass 9.6 cm with mild PET Cerianna uptake consistent with known ER+ positive breast cancer; 12 mm left axillary lymph node with mild PET Cerianna uptake consistent with metastasis. Several additional smaller left axillary and subpectoral lymph nodes do not take up PET Cerianna and are indeterminate.  - 3/31/25: started weekly neoadjuvant Taxol    Current Treatment: weekly neoadjuvant Taxol    Interval history:   Florinda presents to clinic for D1C9 Taxol, she is accompanied by her friend. Overall she is doing well, has noted some paresthesias in her fingertips.     When asked how she is doing emotionally, she reports that \"she thinks her  is struggling with her diagnosis\" she notes that he isn't coming to appointments with her, and goes and plays golf often. She isn't able to express her fears and concerns with him. Additionally, they are busy caring for their 3 year old.     She has pain in the left shoulder region that is not resolving. She states that the discomfort is more muscular in nature as opposed to bone pain. She describes it as a \"radiating pain\" although it doesn't radiate to arm or hand.  She denies history of injury or rotator cuff problems. Denies history of cervical spine injury.     REVIEW OF SYSTEMS:   14 point ROS was reviewed and is negative other than as noted above in HPI.     Current Outpatient Medications   Medication Sig Dispense Refill     levothyroxine (SYNTHROID/LEVOTHROID) 88 MCG tablet Take 88 mcg by mouth daily       lidocaine-prilocaine (EMLA) 2.5-2.5 % external cream Apply to port site at least 15-30 min prior to port access 30 g 3     prochlorperazine (COMPAZINE) 10 MG tablet Take 1 tablet " (10 mg) by mouth every 6 hours as needed for nausea or vomiting. (Patient not taking: Reported on 4/24/2025) 30 tablet 2          Allergies   Allergen Reactions     Animal Dander Cough, Difficulty breathing and Itching     Cat Dander Cough, Difficulty breathing and Itching     Latex      Morphine Nausea and Vomiting     Other (Do Not Use) Nausea and Vomiting     *Darvocet A500         Exam:  Blood pressure 107/57, pulse 81, temperature 98  F (36.7  C), temperature source Oral, resp. rate 16, weight 83 kg (183 lb), SpO2 97%, unknown if currently breastfeeding.  Wt Readings from Last 4 Encounters:   05/27/25 83 kg (183 lb)   05/19/25 83 kg (183 lb)   05/12/25 83 kg (183 lb)   05/05/25 84.8 kg (187 lb)       Constitutional: Pleasant female in no acute distress.  Eyes: No scleral icterus. No conjunctival erythema or discharge  Cardiovascular: Regular rate and rhythm. No murmurs, gallops, or rubs. No peripheral edema.  Respiratory: Clear to auscultation bilaterally. No wheezes or crackles.  Left Breast: reduction of mass, with less tightening of skin; nipple everted without discharge  Right breast: normal exam  Gastrointestinal: Bowel sounds present. Abdomen soft, non-tender. No palpable hepatosplenomegaly or masses.   MSK: moving all extremities freely; she has very tender area over left trapezius muscle, slightly medial to superior scapular border. Strength is 5/5 bilaterally, negative drop on on the left; no scapula bony tenderness  Neuro: Cranial nerves II-XII intact  Gait:walking unassisted  Skin: No visible lesions, bruising or rashes; port site without erythema or edema  Psych: appropriate mood and affect     Labs:    Latest Reference Range & Units 05/27/25 08:49   WBC 4.0 - 11.0 10e3/uL 5.6   Hemoglobin 11.7 - 15.7 g/dL 12.6   Hematocrit 35.0 - 47.0 % 37.5   Platelet Count 150 - 450 10e3/uL 303   RBC Count 3.80 - 5.20 10e6/uL 4.24   MCV 78 - 100 fL 88   MCH 26.5 - 33.0 pg 29.7   MCHC 31.5 - 36.5 g/dL 33.6   RDW  10.0 - 15.0 % 13.1   % Neutrophils % 57   % Lymphocytes % 34   % Monocytes % 7   % Eosinophils % 1   % Basophils % 1   % Immature Granulocytes % 1   NRBC/W <1 /100 0   Absolute Neutrophil 1.6 - 8.3 10e3/uL 3.2   Absolute Lymphocytes 0.8 - 5.3 10e3/uL 1.9   Absolute Monocytes 0.0 - 1.3 10e3/uL 0.4   Absolute Eosinophils 0.0 - 0.7 10e3/uL 0.1   Absolute Basophils 0.0 - 0.2 10e3/uL 0.0   Absolute Immature Granulocytes <=0.4 10e3/uL 0.0   Absolute NRBCs 10e3/uL 0.0       Imaging: reviewed    Impression/plan: Florinda is a 44 year old female diagnosed with Left Breast Cancer    Left Breast Cancer  -  Clinical prognostic stage IIIA, cT3-N1-MX, grade 2 invasive lobular carcinoma of the left upper outer breast, ER positive, NC positive, HER2 negative, MammaPrint high risk, Ki-67 = 17%   - 3/21/2025 Cerianna PET scan with uptake within the known large left breast invasive lobular carcinoma, left axillary node, but no other site and no distant metastatic disease.  --Given the very large size of this tumor, she would benefit from attempted downstaging with neoadjuvant chemotherapy.  - Treatment Plan:  weekly paclitaxel for 12 weeks with reimaging thereafter and likely additional chemotherapy with dose dense Adriamycin and Cytoxan given high risk Mammaprint result. She would then proceed to mastectomy under care of Dr. Nancy Shaikh.  - She will need adjuvant radiation therapy as well as adjuvant endocrine therapy with ovarian suppression therapy with Zoladex injections every 4 weeks in conjunction with an aromatase inhibitor such as anastrozole.   - 3/6/25: Echo LVEF 55-60%  --3/11/2025 Left axillary node biopsy positive for metastatic lobular carcinoma.  --3/18/2025 Right breast biopsy was negative for malignancy.  --3/10/2025 CA 15-3 was elevated at 990.  Dr. Tariq discussed that although NCCN guidelines do not recommend following tumor markers for routine surveillance, following her CA 15-3 will be helpful in her specific  situation for treatment decision making due to the concern for additional micrometastatic disease.  - 5/27/2025 labs reviewed, ok to proceed with Taxol. Discussed compression gloves and stockings, DME provided for both.   - Schedule with [facility] each Taxol, then every 2 weeks with AC alternating with MD     Left shoulder pain  --Dr. Tariq  felt may due to radiating pain from left breast mass but indeterminate. Re-reviewed Cerianna PET did not show any bone metastases or overt abnormalities in the musculature.  - 5/27/2025 : she has a very tender area located near superior scapula border on trapezius muscle.  Unclear if this is radicular in nature, rotator cuff appears intact and no scapula bony tenderness  - Advised that we obtain C-spine x-rays for further evaluation  - Discussed referral to Dr. Robertson, of which she is also agreeable- referral placed     Fertility   -She has one child, age 5 and will not be planning any more children.    Hypothyroidism  - continue levothyroxine    Social/Emotional  - Referral placed to Oncology Psychology  - will reach out to ABRAHAM Albert with patient updates    Reena Dow PA-C  The longitudinal plan of care for the diagnosis(es)/condition(s) as documented were addressed during this visit. Due to the added complexity in care, I will continue to support Florinda in the subsequent management and with ongoing continuity of care.        Again, thank you for allowing me to participate in the care of your patient.        Sincerely,        Reena Dow PA-C    Electronically signed

## 2025-05-27 NOTE — PROGRESS NOTES
Oncology/Hematology Visit Note  5/27/2025      Reason for Visit: Follow-up- Left Breast Cancer  - ER positive, VT positive, HER2 negative, MammaPrint high risk, Ki-67 = 17%     Primary Oncologist: Dr. Tariq    Oncology HPI:   - 1/22/2025: Mammogram at [facility] obtained for increased firmness and swelling of left breast over course of 1 month.  Imaging showed at least a 4.1 cm asymmetry in the left upper outer breast and slight thickening of the left breast.  Ultrasound of left breast showed vague, irregular hypoechoic mass at 1:00, 9 cm from nipple measuring at least 4.3 x 3.8 x 3.1 cm.  No suspicious -.  1/31/2025: Ultrasound-guided left breast core biopsy showed grade 2 invasive lobular carcinoma, angiolymphatic invasion absent, associated LCIS present, ER moderate positive at 95%, VT strongly positive at 98%, HER2 negative with IHC = 1+, Ki-67 = 17%.  MammaPrint high risk-1, luminal B, 6% absolute chemo benefit.  -.  2/20/2025: Breast MRI at [facility] showed abnormal non-mass type enhancement measuring 12 x 6 x 11 cm occupying most of anterior mid and lateral LEFT breast with tethering of underlying pectoralis major muscle but no suspicious enhancement within the muscle itself; additional mass at 3:00 posteriorly on the left appears to represent an intramammary lymph node; mildly enlarged lymph node in the low LEFT axilla was biopsied on 3/11/2025; Sscattered foci of enhancement within the RIGHT breast with the most discrete of these is a mass measuring up to 1.0 cm in maximal dimension in the central core, anterior to middle depth.  - 2/25/2025: CT C/A/P and NM bone scan: reportedly negative for metastatic disease. 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  - 3/6/25: Echo LVEF 55-60%  - 3/11/2025: Left axillary lymph node biopsy positive for metastatic lobular carcinoma of breast, ER positive at 90%, VT positive at 90%, HER-2 negative with IHC = 1+.  - 3/18/2025: Right breast biopsy at 8:00 negative for malignancy.  -  "3/21/2025: Cerianna PET scan showed Left breast mass 9.6 cm with mild PET Cerianna uptake consistent with known ER+ positive breast cancer; 12 mm left axillary lymph node with mild PET Cerianna uptake consistent with metastasis. Several additional smaller left axillary and subpectoral lymph nodes do not take up PET Cerianna and are indeterminate.  - 3/31/25: started weekly neoadjuvant Taxol    Current Treatment: weekly neoadjuvant Taxol    Interval history:   Florinda presents to clinic for D1C9 Taxol, she is accompanied by her friend. Overall she is doing well, has noted some paresthesias in her fingertips.     When asked how she is doing emotionally, she reports that \"she thinks her  is struggling with her diagnosis\" she notes that he isn't coming to appointments with her, and goes and plays golf often. She isn't able to express her fears and concerns with him. Additionally, they are busy caring for their 3 year old.     She has pain in the left shoulder region that is not resolving. She states that the discomfort is more muscular in nature as opposed to bone pain. She describes it as a \"radiating pain\" although it doesn't radiate to arm or hand.  She denies history of injury or rotator cuff problems. Denies history of cervical spine injury.     REVIEW OF SYSTEMS:   14 point ROS was reviewed and is negative other than as noted above in HPI.     Current Outpatient Medications   Medication Sig Dispense Refill    levothyroxine (SYNTHROID/LEVOTHROID) 88 MCG tablet Take 88 mcg by mouth daily      lidocaine-prilocaine (EMLA) 2.5-2.5 % external cream Apply to port site at least 15-30 min prior to port access 30 g 3    prochlorperazine (COMPAZINE) 10 MG tablet Take 1 tablet (10 mg) by mouth every 6 hours as needed for nausea or vomiting. (Patient not taking: Reported on 4/24/2025) 30 tablet 2          Allergies   Allergen Reactions    Animal Dander Cough, Difficulty breathing and Itching    Cat Dander Cough, " Difficulty breathing and Itching    Latex     Morphine Nausea and Vomiting    Other (Do Not Use) Nausea and Vomiting     *Darvocet A500         Exam:  Blood pressure 107/57, pulse 81, temperature 98  F (36.7  C), temperature source Oral, resp. rate 16, weight 83 kg (183 lb), SpO2 97%, unknown if currently breastfeeding.  Wt Readings from Last 4 Encounters:   05/27/25 83 kg (183 lb)   05/19/25 83 kg (183 lb)   05/12/25 83 kg (183 lb)   05/05/25 84.8 kg (187 lb)       Constitutional: Pleasant female in no acute distress.  Eyes: No scleral icterus. No conjunctival erythema or discharge  Cardiovascular: Regular rate and rhythm. No murmurs, gallops, or rubs. No peripheral edema.  Respiratory: Clear to auscultation bilaterally. No wheezes or crackles.  Left Breast: reduction of mass, with less tightening of skin; nipple everted without discharge  Right breast: normal exam  Gastrointestinal: Bowel sounds present. Abdomen soft, non-tender. No palpable hepatosplenomegaly or masses.   MSK: moving all extremities freely; she has very tender area over left trapezius muscle, slightly medial to superior scapular border. Strength is 5/5 bilaterally, negative drop on on the left; no scapula bony tenderness  Neuro: Cranial nerves II-XII intact  Gait:walking unassisted  Skin: No visible lesions, bruising or rashes; port site without erythema or edema  Psych: appropriate mood and affect     Labs:    Latest Reference Range & Units 05/27/25 08:49   WBC 4.0 - 11.0 10e3/uL 5.6   Hemoglobin 11.7 - 15.7 g/dL 12.6   Hematocrit 35.0 - 47.0 % 37.5   Platelet Count 150 - 450 10e3/uL 303   RBC Count 3.80 - 5.20 10e6/uL 4.24   MCV 78 - 100 fL 88   MCH 26.5 - 33.0 pg 29.7   MCHC 31.5 - 36.5 g/dL 33.6   RDW 10.0 - 15.0 % 13.1   % Neutrophils % 57   % Lymphocytes % 34   % Monocytes % 7   % Eosinophils % 1   % Basophils % 1   % Immature Granulocytes % 1   NRBC/W <1 /100 0   Absolute Neutrophil 1.6 - 8.3 10e3/uL 3.2   Absolute Lymphocytes 0.8 - 5.3  10e3/uL 1.9   Absolute Monocytes 0.0 - 1.3 10e3/uL 0.4   Absolute Eosinophils 0.0 - 0.7 10e3/uL 0.1   Absolute Basophils 0.0 - 0.2 10e3/uL 0.0   Absolute Immature Granulocytes <=0.4 10e3/uL 0.0   Absolute NRBCs 10e3/uL 0.0       Imaging: reviewed    Impression/plan: Florinda is a 44 year old female diagnosed with Left Breast Cancer    Left Breast Cancer  -  Clinical prognostic stage IIIA, cT3-N1-MX, grade 2 invasive lobular carcinoma of the left upper outer breast, ER positive, DE positive, HER2 negative, MammaPrint high risk, Ki-67 = 17%   - 3/21/2025 Cerianna PET scan with uptake within the known large left breast invasive lobular carcinoma, left axillary node, but no other site and no distant metastatic disease.  --Given the very large size of this tumor, she would benefit from attempted downstaging with neoadjuvant chemotherapy.  - Treatment Plan:  weekly paclitaxel for 12 weeks with reimaging thereafter and likely additional chemotherapy with dose dense Adriamycin and Cytoxan given high risk Mammaprint result. She would then proceed to mastectomy under care of Dr. Nancy Shaikh.  - She will need adjuvant radiation therapy as well as adjuvant endocrine therapy with ovarian suppression therapy with Zoladex injections every 4 weeks in conjunction with an aromatase inhibitor such as anastrozole.   - 3/6/25: Echo LVEF 55-60%  --3/11/2025 Left axillary node biopsy positive for metastatic lobular carcinoma.  --3/18/2025 Right breast biopsy was negative for malignancy.  --3/10/2025 CA 15-3 was elevated at 990.  Dr. Tariq discussed that although NCCN guidelines do not recommend following tumor markers for routine surveillance, following her CA 15-3 will be helpful in her specific situation for treatment decision making due to the concern for additional micrometastatic disease.  - 5/27/2025 labs reviewed, ok to proceed with Taxol. Discussed compression gloves and stockings, DME provided for both.   - Schedule with [facility]ES[facility]  each Taxol, then every 2 weeks with AC alternating with MD     Left shoulder pain  --Dr. Tariq  felt may due to radiating pain from left breast mass but indeterminate. Re-reviewed Cerianna PET did not show any bone metastases or overt abnormalities in the musculature.  - 5/27/2025 : she has a very tender area located near superior scapula border on trapezius muscle.  Unclear if this is radicular in nature, rotator cuff appears intact and no scapula bony tenderness  - Advised that we obtain C-spine x-rays for further evaluation  - Discussed referral to Dr. Robertson, of which she is also agreeable- referral placed     Fertility   -She has one child, age 3 and will not be planning any more children.    Hypothyroidism  - continue levothyroxine    Social/Emotional  - Referral placed to Oncology Psychology  - will reach out to ABRAHAM Albert with patient updates    Reena Dow PA-C  The longitudinal plan of care for the diagnosis(es)/condition(s) as documented were addressed during this visit. Due to the added complexity in care, I will continue to support Florinda in the subsequent management and with ongoing continuity of care.

## 2025-05-27 NOTE — PROGRESS NOTES
Social Work - Telephone/MyChart message  [facility]  Data:   Patient Name: Florinda Keller  Goes By: Florinda TUCKER/Age: 1980 (44 year old)      Referral Source: eRena Dow    Reason for Referral: Caregiver Support    Intervention: Left voice message for patient on 2025 and Sent patient MyChart message on 2025.   Plan:  will await patient's return phone call/message and provide assistance at that time.   JESSE Abraham, [facility]  Clinical - Adult Oncology  Phone: [phone]  She/Her/Hers  [facility]: Cristal ARELLANO  8am-4:30pm  [facility]: ROMA Gonsalez F 8am-4:30pm   Support Groups at [facility]: Social Work Services for Cancer Patients ([email or web address])

## 2025-05-28 ENCOUNTER — TELEPHONE (OUTPATIENT)
Dept: ONCOLOGY | Facility: CLINIC | Age: 45
End: 2025-05-28
Payer: COMMERCIAL

## 2025-05-28 DIAGNOSIS — M79.602 PAIN OF LEFT UPPER EXTREMITY: Primary | ICD-10-CM

## 2025-05-29 ENCOUNTER — VIRTUAL VISIT (OUTPATIENT)
Dept: ONCOLOGY | Facility: HOSPITAL | Age: 45
End: 2025-05-29
Payer: COMMERCIAL

## 2025-05-29 ENCOUNTER — PATIENT OUTREACH (OUTPATIENT)
Dept: CARE COORDINATION | Facility: CLINIC | Age: 45
End: 2025-05-29
Payer: COMMERCIAL

## 2025-05-29 VITALS — WEIGHT: 180 LBS | BODY MASS INDEX: 27.28 KG/M2 | HEIGHT: 68 IN

## 2025-05-29 DIAGNOSIS — Z17.0 MALIGNANT NEOPLASM OF OVERLAPPING SITES OF LEFT BREAST IN FEMALE, ESTROGEN RECEPTOR POSITIVE (H): ICD-10-CM

## 2025-05-29 DIAGNOSIS — F43.22 ADJUSTMENT DISORDER WITH ANXIETY: Primary | ICD-10-CM

## 2025-05-29 DIAGNOSIS — C50.812 MALIGNANT NEOPLASM OF OVERLAPPING SITES OF LEFT BREAST IN FEMALE, ESTROGEN RECEPTOR POSITIVE (H): ICD-10-CM

## 2025-05-29 NOTE — NURSING NOTE
Current patient location: [address] [place] [id number]    Is the patient currently in the state of [place]? YES    Visit mode: Telephone   If the visit is dropped, the patient can be reconnected by:TELEPHONE VISIT: Phone number: [phone]    Will anyone else be joining the visit? NO  (If patient encounters technical issues they should call [phone])    Are changes needed to the allergy or medication list? No    Are refills needed on medications prescribed by this physician? YES    Rooming Documentation:  Questionnaire(s) completed    Reason for visit: Consult    Susi HOBSON

## 2025-05-29 NOTE — PROGRESS NOTES
Virtual Visit Details    Type of service:  Telephone Visit   Phone call duration: 40 minutes   Originating Location (pt. Location): Home    Distant Location (provider location):  On-site  Telephone visit completed due to the patient did not consent to a video visit.

## 2025-05-29 NOTE — PROGRESS NOTES
Social Work - Telephone/MyChart message  [facility]  Data:   Patient Name: Florinda Keller  Goes By: Florinda       /Age: 1980 (44 year old)        Reason for Referral: Returning voicemail    Intervention: Left voice message for patient on 2025 and Sent patient MyChart message on 2025.   Plan:  will await patient's return phone call/message and provide assistance at that time.   JESSE Abraham, [facility]  Clinical - Adult Oncology  Phone: [phone]  She/Her/Hers  [facility]: Cristal ARELLANO  8am-4:30pm  [facility]: ROMA Gonsalez F 8am-4:30pm   Support Groups at [facility]: Social Work Services for Cancer Patients ([email or web address])

## 2025-05-29 NOTE — PROGRESS NOTES
"    [facility] Oncology- Psychotherapy                                    Progress Note    Patient Name: Florinda Keller  Date: 2025           Service Type: Individual      Session Start Time: 0900  Session End Time: ***     Session Length: ***    Session #: 1    Attendees: Client attended alone    Service Modality:  Phone Visit:      Provider verified identity through the following two step process.  Patient provided:  Patient     Telephone Visit: The patient's condition can be safely assessed and treated via synchronous audio telemedicine encounter.      Reason for Audio Telemedicine Visit: Patient has requested telehealth visit    Originating Site (Patient Location): Patient's home    Distant Site (Provider Location): [facility]    Telephone visit completed due to the patient did not consent to a video visit.    Consent:  The patient/guardian has verbally consented to:     1. The potential risks and benefits of telemedicine (telephone visit) versus in person care;    The patient has been notified of the following:      \"We have found that certain health care needs can be provided without the need for a face to face visit.  This service lets us provide the care you need with a phone conversation.       I will have full access to your [facility] medical record during this entire phone call.  I will be taking notes for your medical record.      Since this is like an office visit, we will bill your insurance company for this service.       There are potential benefits and risks of telephone visits (e.g. limits to patient confidentiality) that differ from in-person visits.?Confidentiality still applies for telephone services, and nobody will record the visit.  It is important to be in a quiet, private space that is free of distractions (including cell phone or other devices) during the visit.??      If during the course of the call I believe a telephone visit is " "not appropriate, you will not be charged for this service\"     Consent has been obtained for this service by care team member: Yes     DATA  Interactive Complexity: No  Crisis: No        Progress Since Last Session (Related to Symptoms / Goals / Homework):   Symptoms: ***    Homework: NA      Episode of Care Goals: Satisfactory progress - ACTION (Actively working towards change); Intervened by reinforcing change plan / affirming steps taken     Current / Ongoing Stressors and Concerns:   ***     Treatment Objective(s) Addressed in This Session:   {Treatment Objective Groups:477040}  ***     Intervention:   CBT: ,  Emotion Focused Therapy: ,  Solution Focused: ,    Assessments completed prior to visit:  none      ASSESSMENT: Current Emotional / Mental Status (status of significant symptoms):   Risk status (Self / Other harm or suicidal ideation)   Patient {PERSONAL SAFETY:463640}   Patient {SUICIDAL IDEATION:837383}   Patient {HOMICIDAL IDEATION:229007}   Patient {SELF INJURIOUS:616868}   Patient {OTHER SAFETY CONCERNS:742480}   Patient {FCC CHANGE IN RISK FACTORS:928536}   Patient {FCC CHANGE IN PROTECTIVE FACTORS:840131}   {SAFETY PLAN:127682}     Appearance:   {Appearance:181967::\"Appropriate \"}   Eye Contact:   {Eye Contact:973025::\"Good \"}   Psychomotor Behavior: {Psychomotor Behavior:498533::\"Normal \"}   Attitude:   {Attitude:229224::\"Cooperative \"}   Orientation:   {Orientation:255446::\"All\"}   Speech    Rate / Production: {Speech Rate/Production:949927::\"Normal \"}    Volume:  {Speech Volume:903008::\"Normal \"}   Mood:    {Mood:695548::\"Normal\"}   Affect:    {Affect:519366::\"Appropriate \"}   Thought Content:  {Thought Content:284526::\"Clear \"}   Thought Form:  {Thought Form:899015::\"Coherent \",\"Logical \"}   Insight:    {Insight:693691::\"Good \"}     Changes in Health Issues:   Yes: {HEALTH ISSUES:635491}     Chemical Use Review:   Substance Use: Chemical use reviewed, no active concerns identified      Tobacco " Use: No current tobacco use.      Diagnosis:  1. Malignant neoplasm of overlapping sites of left breast in female, estrogen receptor positive (H)        Collateral Reports Completed:   Not Applicable    PLAN: (Patient Tasks / Therapist Tasks / Other)  Return as needed, utilize coping skills discussed         Jarocho Yu LP                                                         ______________________________________________________________________    Individual Treatment Plan    Patient's Name: Florinda Keller  YOB: 1980    Date of Creation: 5/29/2025    Date Treatment Plan Last Reviewed/Revised:     DSM5 Diagnoses: {DSM5  Diagnosis:552427:o}  Psychosocial / Contextual Factors: cancer dx  PROMIS (reviewed every 90 days):     Referral / Collaboration:  Referral to another professional/service is not indicated at this time..    Anticipated number of session for this episode of care: 9-12 sessions  Anticipation frequency of session: Biweekly  Anticipated Duration of each session: 53 or more minutes  Treatment plan will be reviewed in 90 days or when goals have been changed.       MeasurableTreatment Goal(s) related to diagnosis / functional impairment(s)  Goal 1: Patient will ***    I will know I've met my goal when ***.      Objective #A (Patient Action)    Patient will {Treatment Objective Groups:333039}.  Status: {Status:875742}     Intervention(s)  Therapist will teach {Teach:418096}.    Objective #B  Patient will {Treatment Objective Groups:010111}.  Status: {Status:844156}     Intervention(s)  Therapist will {Interventions:119349}.    Objective #C  Patient will {Treatment Objective Groups:800695}.  Status: {Status:619995}     Intervention(s)  Therapist will {Interventions:177690}.      Goal 2: Patient will ***    I will know I've met my goal when ***.      Objective #A (Patient Action)    Status: {Status:518896}     Patient will {Treatment Objective  Groups:516822}.    Intervention(s)  Therapist will {Interventions:806176}.    Objective #B  Patient will {Treatment Objective Groups:471970}.    Status: {Status:908324}     Intervention(s)  Therapist will {Interventions:378473}.    Objective #C  Patient will {Treatment Objective Groups:785933}.  Status: {Status:569365}     Intervention(s)  Therapist will {Interventions:994768}.      {Patient:676865} {Reviewed:145955}.      Jarocho Yu LP  May 29, 2025

## 2025-05-29 NOTE — PROGRESS NOTES
"    [facility] Oncology- Psychotherapy                                    Progress Note    Patient Name: Florinda Keller  Date: 2025       Service Type: Individual      Session Start Time: 910  Session End Time: 950     Session Length: 40 mins    Session #: 1    Attendees: Client attended alone    Service Modality:  Phone Visit:      Provider verified identity through the following two step process.  Patient provided:  Patient     Telephone Visit: The patient's condition can be safely assessed and treated via synchronous audio telemedicine encounter.      Reason for Audio Telemedicine Visit: Patient has requested telehealth visit    Originating Site (Patient Location): Patient's home    Distant Site (Provider Location): [facility] [place]    Telephone visit completed due to the patient did not consent to a video visit.    Consent:  The patient/guardian has verbally consented to:     1. The potential risks and benefits of telemedicine (telephone visit) versus in person care;    The patient has been notified of the following:      \"We have found that certain health care needs can be provided without the need for a face to face visit.  This service lets us provide the care you need with a phone conversation.       I will have full access to your [facility] medical record during this entire phone call.  I will be taking notes for your medical record.      Since this is like an office visit, we will bill your insurance company for this service.       There are potential benefits and risks of telephone visits (e.g. limits to patient confidentiality) that differ from in-person visits.?Confidentiality still applies for telephone services, and nobody will record the visit.  It is important to be in a quiet, private space that is free of distractions (including cell phone or other devices) during the visit.??      If during the course of the call I believe a telephone visit is " "not appropriate, you will not be charged for this service\"     Consent has been obtained for this service by care team member: Yes     DATA  Interactive Complexity: No  Crisis: No        Progress Since Last Session (Related to Symptoms / Goals / Homework):   Symptoms: Pt reports she has anxiety related to what she tells people about her cancer.      Pt reports anxiety related to her marriage.      Pt reports she feels like she is \"in a .\"       Pt reports stress around family issues.     Homework: none      Episode of Care Goals: Satisfactory progress - ACTION (Actively working towards change); Intervened by reinforcing change plan / affirming steps taken     Current / Ongoing Stressors and Concerns:   Pt reports her treatments are going ok. She was dx 2/2025.      Pt is  and she has a three year old. Pt reports her spouse is not good. He does not want details. He escapes by golfing. He seems to want to ignore the whole thing.      Pt reports she has thoughts to leave.      Pt reports some issues with her spouse's mother and brother.      Pt reports she has friends and parents who are supportive.      Pt reports she is on leave, she values her work. Pt reports she is doing a lot of house work.      Pt reports she reads book.               Treatment Objective(s) Addressed in This Session:   identify medical stressors which contribute to feelings of anxiety       Intervention:   CBT: ,  Emotion Focused Therapy: ,  Solution Focused: ,    Assessments completed prior to visit:  None      ASSESSMENT: Current Emotional / Mental Status (status of significant symptoms):   Risk status (Self / Other harm or suicidal ideation)   Patient denies current fears or concerns for personal safety.   Patient denies current or recent suicidal ideation or behaviors.   Patient denies current or recent homicidal ideation or behaviors.   Patient denies current or recent self injurious behavior or ideation.   Patient denies " other safety concerns.    Recommended that patient call 911 or go to the local ED should there be a change in any of these risk factors     Appearance:   NA Phone session    Eye Contact:   NA    Psychomotor Behavior: NA    Attitude:   Cooperative    Orientation:   All   Speech    Rate / Production: Normal     Volume:  Normal    Mood:    Anxious    Affect:    NA    Thought Content:  Clear    Thought Form:  Coherent  Logical    Insight:    Good      Changes in Health Issues:   Yes: cancer dx, Associated Psychological Distress     Chemical Use Review:   Substance Use: Chemical use reviewed, no active concerns identified      Tobacco Use: No current tobacco use.      Diagnosis:  1. Malignant neoplasm of overlapping sites of left breast in female, estrogen receptor positive (H)    F43.22 adjustment d/o with anxiety    Collateral Reports Completed:   None    PLAN: (Patient Tasks / Therapist Tasks / Other)  Return as needed, utilize coping skills discussed.        Jarocho Yu LP                                                         ______________________________________________________________________    Individual Treatment Plan    Patient's Name: Florinda Keller  YOB: 1980    Date of Creation: 5/29/2025    Date Treatment Plan Last Reviewed/Revised:     DSM5 Diagnoses: F43.22 adjustment d/o with anxiety  Psychosocial / Contextual Factors: cancer dx  PROMIS (reviewed every 90 days):     Referral / Collaboration:  Referral to another professional/service is not indicated at this time..    Anticipated number of session for this episode of care: 9-12 sessions  Anticipation frequency of session: Biweekly  Anticipated Duration of each session: 38-52 minutes  Treatment plan will be reviewed in 90 days or when goals have been changed.       MeasurableTreatment Goal(s) related to diagnosis / functional impairment(s)  Goal 1: Patient will reduce anxiety     Objective #A (Patient Action)    Patient will  use at least 3 coping skills for anxiety management in the next 3 weeks.  Status: New - Date: 5/29/25     Intervention(s)  Therapist will teach emotional regulation skills.  .    Objective #B  Patient will use distraction each time intrusive worry surfaces.  Status: New - Date: 5/29/25     Intervention(s)  Therapist will teach emotional regulation skills.  .      Patient has reviewed and agreed to the above plan.      Jarocho Yu LP  May 29, 2025

## 2025-06-01 ENCOUNTER — INFUSION THERAPY VISIT (OUTPATIENT)
Dept: INFUSION THERAPY | Facility: CLINIC | Age: 45
End: 2025-06-01
Attending: INTERNAL MEDICINE
Payer: COMMERCIAL

## 2025-06-01 DIAGNOSIS — Z17.0 MALIGNANT NEOPLASM OF OVERLAPPING SITES OF LEFT BREAST IN FEMALE, ESTROGEN RECEPTOR POSITIVE (H): Primary | ICD-10-CM

## 2025-06-01 DIAGNOSIS — C50.812 MALIGNANT NEOPLASM OF OVERLAPPING SITES OF LEFT BREAST IN FEMALE, ESTROGEN RECEPTOR POSITIVE (H): Primary | ICD-10-CM

## 2025-06-01 LAB
BASOPHILS # BLD AUTO: 0 10E3/UL (ref 0–0.2)
BASOPHILS NFR BLD AUTO: 0 %
CANCER AG15-3 SERPL-ACNC: 311 U/ML
EOSINOPHIL # BLD AUTO: 0.1 10E3/UL (ref 0–0.7)
EOSINOPHIL NFR BLD AUTO: 1 %
ERYTHROCYTE [DISTWIDTH] IN BLOOD BY AUTOMATED COUNT: 13.2 % (ref 10–15)
HCT VFR BLD AUTO: 38.9 % (ref 35–47)
HGB BLD-MCNC: 13.4 G/DL (ref 11.7–15.7)
IMM GRANULOCYTES # BLD: 0 10E3/UL
IMM GRANULOCYTES NFR BLD: 0 %
LYMPHOCYTES # BLD AUTO: 1.9 10E3/UL (ref 0.8–5.3)
LYMPHOCYTES NFR BLD AUTO: 42 %
MCH RBC QN AUTO: 30.2 PG (ref 26.5–33)
MCHC RBC AUTO-ENTMCNC: 34.4 G/DL (ref 31.5–36.5)
MCV RBC AUTO: 88 FL (ref 78–100)
MONOCYTES # BLD AUTO: 0.2 10E3/UL (ref 0–1.3)
MONOCYTES NFR BLD AUTO: 4 %
NEUTROPHILS # BLD AUTO: 2.4 10E3/UL (ref 1.6–8.3)
NEUTROPHILS NFR BLD AUTO: 52 %
NRBC # BLD AUTO: 0 10E3/UL
NRBC BLD AUTO-RTO: 0 /100
PLATELET # BLD AUTO: 301 10E3/UL (ref 150–450)
RBC # BLD AUTO: 4.44 10E6/UL (ref 3.8–5.2)
WBC # BLD AUTO: 4.6 10E3/UL (ref 4–11)

## 2025-06-01 PROCEDURE — 86300 IMMUNOASSAY TUMOR CA 15-3: CPT | Performed by: INTERNAL MEDICINE

## 2025-06-01 PROCEDURE — 36415 COLL VENOUS BLD VENIPUNCTURE: CPT | Performed by: INTERNAL MEDICINE

## 2025-06-01 PROCEDURE — 85014 HEMATOCRIT: CPT | Performed by: INTERNAL MEDICINE

## 2025-06-01 NOTE — PROGRESS NOTES
Nursing Note:  Florinda ARELLANO Krishna presents today for labs.    Patient seen by provider today: No   present during visit today: Not Applicable.    Note: N/A.    Intravenous Access:  Lab draw site right AC, Needle type BF, Gauge 23.  Labs drawn without difficulty.    Discharge Plan:   Patient was discharged home.    Neha Loredo RN

## 2025-06-02 ENCOUNTER — INFUSION THERAPY VISIT (OUTPATIENT)
Dept: INFUSION THERAPY | Facility: CLINIC | Age: 45
End: 2025-06-02
Attending: INTERNAL MEDICINE
Payer: COMMERCIAL

## 2025-06-02 VITALS
OXYGEN SATURATION: 100 % | BODY MASS INDEX: 27.55 KG/M2 | WEIGHT: 181.2 LBS | SYSTOLIC BLOOD PRESSURE: 102 MMHG | TEMPERATURE: 98.6 F | HEART RATE: 62 BPM | RESPIRATION RATE: 16 BRPM | DIASTOLIC BLOOD PRESSURE: 68 MMHG

## 2025-06-02 DIAGNOSIS — C50.812 MALIGNANT NEOPLASM OF OVERLAPPING SITES OF LEFT BREAST IN FEMALE, ESTROGEN RECEPTOR POSITIVE (H): Primary | ICD-10-CM

## 2025-06-02 DIAGNOSIS — Z17.0 MALIGNANT NEOPLASM OF OVERLAPPING SITES OF LEFT BREAST IN FEMALE, ESTROGEN RECEPTOR POSITIVE (H): Primary | ICD-10-CM

## 2025-06-02 PROCEDURE — 96413 CHEMO IV INFUSION 1 HR: CPT

## 2025-06-02 PROCEDURE — 250N000013 HC RX MED GY IP 250 OP 250 PS 637: Performed by: INTERNAL MEDICINE

## 2025-06-02 PROCEDURE — 96367 TX/PROPH/DG ADDL SEQ IV INF: CPT

## 2025-06-02 PROCEDURE — 250N000011 HC RX IP 250 OP 636: Performed by: INTERNAL MEDICINE

## 2025-06-02 PROCEDURE — 258N000003 HC RX IP 258 OP 636: Performed by: INTERNAL MEDICINE

## 2025-06-02 PROCEDURE — 96375 TX/PRO/DX INJ NEW DRUG ADDON: CPT

## 2025-06-02 RX ORDER — HEPARIN SODIUM (PORCINE) LOCK FLUSH IV SOLN 100 UNIT/ML 100 UNIT/ML
5 SOLUTION INTRAVENOUS
Status: DISCONTINUED | OUTPATIENT
Start: 2025-06-02 | End: 2025-06-02 | Stop reason: HOSPADM

## 2025-06-02 RX ORDER — DIPHENHYDRAMINE HCL 25 MG
50 CAPSULE ORAL
Status: DISCONTINUED | OUTPATIENT
Start: 2025-06-02 | End: 2025-06-02 | Stop reason: HOSPADM

## 2025-06-02 RX ADMIN — DIPHENHYDRAMINE HYDROCHLORIDE 50 MG: 25 CAPSULE ORAL at 11:27

## 2025-06-02 RX ADMIN — FAMOTIDINE 20 MG: 10 INJECTION, SOLUTION INTRAVENOUS at 11:32

## 2025-06-02 RX ADMIN — Medication 5 ML: at 13:15

## 2025-06-02 RX ADMIN — SODIUM CHLORIDE 250 ML: 0.9 INJECTION, SOLUTION INTRAVENOUS at 11:28

## 2025-06-02 RX ADMIN — PACLITAXEL 161 MG: 6 INJECTION, SOLUTION INTRAVENOUS at 12:13

## 2025-06-02 RX ADMIN — DEXAMETHASONE SODIUM PHOSPHATE: 10 INJECTION, SOLUTION INTRAMUSCULAR; INTRAVENOUS at 11:33

## 2025-06-02 NOTE — PROGRESS NOTES
Infusion Nursing Note:  Florinda Keller presents today for Paclitaxol.    Patient seen by provider: Yes: Seen by Reena Dow PA-C on 5/27   present during visit today: Not Applicable.    Note: Pt states no changes since she has been seen by Reena on 5/27.  Pt has had previous reaction to paclitaxol and has had pre-medications added back in this infusion and previous infusions and has tolerated since.  Pt uses ice and compression to hands/feet during infusion and has own supplies.        Intravenous Access:  Implanted Port.    Treatment Conditions:  Lab Results   Component Value Date    HGB 13.4 06/01/2025    WBC 4.6 06/01/2025    ANEU 2.4 06/01/2025     06/01/2025        Lab Results   Component Value Date     03/10/2025    POTASSIUM 4.3 03/10/2025    CR 0.65 03/10/2025    SJ 9.2 03/10/2025    BILITOTAL 0.9 05/18/2025    ALBUMIN 4.2 05/18/2025    ALT 29 05/18/2025    AST 32 05/18/2025       Results reviewed, labs MET treatment parameters, ok to proceed with treatment.      Post Infusion Assessment:  Patient tolerated infusion without incident.  Blood return noted pre and post infusion.  Site patent and intact, free from redness, edema or discomfort.  No evidence of extravasations.  Access discontinued per protocol.       Discharge Plan:   AVS to patient via MYCHART.  Patient will return 6/8/25 for next appointment.   Patient discharged in stable condition accompanied by: .  Departure Mode: Ambulatory.      Genesis Loo RN

## 2025-06-04 ENCOUNTER — HOSPITAL ENCOUNTER (OUTPATIENT)
Dept: GENERAL RADIOLOGY | Facility: CLINIC | Age: 45
Discharge: HOME | End: 2025-06-04
Payer: COMMERCIAL

## 2025-06-04 DIAGNOSIS — M79.602 PAIN OF LEFT UPPER EXTREMITY: ICD-10-CM

## 2025-06-04 PROCEDURE — 72040 X-RAY EXAM NECK SPINE 2-3 VW: CPT

## 2025-06-04 RX ORDER — DIPHENHYDRAMINE HYDROCHLORIDE 50 MG/ML
25 INJECTION, SOLUTION INTRAMUSCULAR; INTRAVENOUS
Start: 2025-06-09

## 2025-06-04 RX ORDER — MEPERIDINE HYDROCHLORIDE 25 MG/ML
25 INJECTION INTRAMUSCULAR; INTRAVENOUS; SUBCUTANEOUS
OUTPATIENT
Start: 2025-06-09

## 2025-06-04 RX ORDER — ALBUTEROL SULFATE 0.83 MG/ML
2.5 SOLUTION RESPIRATORY (INHALATION)
OUTPATIENT
Start: 2025-06-09

## 2025-06-04 RX ORDER — EPINEPHRINE 1 MG/ML
0.3 INJECTION, SOLUTION, CONCENTRATE INTRAVENOUS EVERY 5 MIN PRN
OUTPATIENT
Start: 2025-06-09

## 2025-06-04 RX ORDER — DIPHENHYDRAMINE HCL 25 MG
50 CAPSULE ORAL
Start: 2025-06-09

## 2025-06-04 RX ORDER — HEPARIN SODIUM (PORCINE) LOCK FLUSH IV SOLN 100 UNIT/ML 100 UNIT/ML
5 SOLUTION INTRAVENOUS
OUTPATIENT
Start: 2025-06-09

## 2025-06-04 RX ORDER — ALBUTEROL SULFATE 90 UG/1
1-2 INHALANT RESPIRATORY (INHALATION)
Start: 2025-06-09

## 2025-06-04 RX ORDER — LORAZEPAM 2 MG/ML
0.5 INJECTION INTRAMUSCULAR EVERY 4 HOURS PRN
OUTPATIENT
Start: 2025-06-09

## 2025-06-04 RX ORDER — HEPARIN SODIUM,PORCINE 10 UNIT/ML
5-20 VIAL (ML) INTRAVENOUS DAILY PRN
OUTPATIENT
Start: 2025-06-09

## 2025-06-04 RX ORDER — DIPHENHYDRAMINE HYDROCHLORIDE 50 MG/ML
50 INJECTION, SOLUTION INTRAMUSCULAR; INTRAVENOUS
Start: 2025-06-09

## 2025-06-06 ENCOUNTER — RESULTS FOLLOW-UP (OUTPATIENT)
Dept: ONCOLOGY | Facility: CLINIC | Age: 45
End: 2025-06-06

## 2025-06-08 ENCOUNTER — INFUSION THERAPY VISIT (OUTPATIENT)
Dept: INFUSION THERAPY | Facility: CLINIC | Age: 45
End: 2025-06-08
Attending: INTERNAL MEDICINE
Payer: COMMERCIAL

## 2025-06-08 DIAGNOSIS — C50.812 MALIGNANT NEOPLASM OF OVERLAPPING SITES OF LEFT BREAST IN FEMALE, ESTROGEN RECEPTOR POSITIVE (H): Primary | ICD-10-CM

## 2025-06-08 DIAGNOSIS — Z17.0 MALIGNANT NEOPLASM OF OVERLAPPING SITES OF LEFT BREAST IN FEMALE, ESTROGEN RECEPTOR POSITIVE (H): Primary | ICD-10-CM

## 2025-06-08 LAB
BASOPHILS # BLD AUTO: 0 10E3/UL (ref 0–0.2)
BASOPHILS NFR BLD AUTO: 1 %
EOSINOPHIL # BLD AUTO: 0.1 10E3/UL (ref 0–0.7)
EOSINOPHIL NFR BLD AUTO: 2 %
ERYTHROCYTE [DISTWIDTH] IN BLOOD BY AUTOMATED COUNT: 13.5 % (ref 10–15)
HCT VFR BLD AUTO: 39.1 % (ref 35–47)
HGB BLD-MCNC: 13.1 G/DL (ref 11.7–15.7)
IMM GRANULOCYTES # BLD: 0 10E3/UL
IMM GRANULOCYTES NFR BLD: 1 %
LYMPHOCYTES # BLD AUTO: 1.9 10E3/UL (ref 0.8–5.3)
LYMPHOCYTES NFR BLD AUTO: 43 %
MCH RBC QN AUTO: 30.1 PG (ref 26.5–33)
MCHC RBC AUTO-ENTMCNC: 33.5 G/DL (ref 31.5–36.5)
MCV RBC AUTO: 90 FL (ref 78–100)
MONOCYTES # BLD AUTO: 0.2 10E3/UL (ref 0–1.3)
MONOCYTES NFR BLD AUTO: 5 %
NEUTROPHILS # BLD AUTO: 2.2 10E3/UL (ref 1.6–8.3)
NEUTROPHILS NFR BLD AUTO: 49 %
NRBC # BLD AUTO: 0 10E3/UL
NRBC BLD AUTO-RTO: 0 /100
PLATELET # BLD AUTO: 290 10E3/UL (ref 150–450)
RBC # BLD AUTO: 4.35 10E6/UL (ref 3.8–5.2)
WBC # BLD AUTO: 4.5 10E3/UL (ref 4–11)

## 2025-06-08 PROCEDURE — 36415 COLL VENOUS BLD VENIPUNCTURE: CPT | Performed by: INTERNAL MEDICINE

## 2025-06-08 PROCEDURE — 85025 COMPLETE CBC W/AUTO DIFF WBC: CPT | Performed by: INTERNAL MEDICINE

## 2025-06-08 NOTE — PROGRESS NOTES
Nursing Note:  Florinda ARELLANO Krishna presents today for labs.    Patient seen by provider today: No   present during visit today: Not Applicable.    Note: N/A.    Intravenous Access:  Lab draw site right AC, Needle type BF, Gauge 21.  Labs drawn without difficulty.    Discharge Plan:   Patient was discharged home.    Neha Loredo RN

## 2025-06-09 ENCOUNTER — INFUSION THERAPY VISIT (OUTPATIENT)
Dept: INFUSION THERAPY | Facility: CLINIC | Age: 45
End: 2025-06-09
Payer: COMMERCIAL

## 2025-06-09 VITALS
RESPIRATION RATE: 16 BRPM | TEMPERATURE: 98.2 F | BODY MASS INDEX: 28.1 KG/M2 | SYSTOLIC BLOOD PRESSURE: 110 MMHG | OXYGEN SATURATION: 98 % | HEART RATE: 82 BPM | DIASTOLIC BLOOD PRESSURE: 66 MMHG | WEIGHT: 184.8 LBS

## 2025-06-09 DIAGNOSIS — Z17.0 MALIGNANT NEOPLASM OF OVERLAPPING SITES OF LEFT BREAST IN FEMALE, ESTROGEN RECEPTOR POSITIVE (H): Primary | ICD-10-CM

## 2025-06-09 DIAGNOSIS — C50.812 MALIGNANT NEOPLASM OF OVERLAPPING SITES OF LEFT BREAST IN FEMALE, ESTROGEN RECEPTOR POSITIVE (H): Primary | ICD-10-CM

## 2025-06-09 PROCEDURE — 258N000003 HC RX IP 258 OP 636: Performed by: INTERNAL MEDICINE

## 2025-06-09 PROCEDURE — 96367 TX/PROPH/DG ADDL SEQ IV INF: CPT

## 2025-06-09 PROCEDURE — 250N000011 HC RX IP 250 OP 636: Performed by: INTERNAL MEDICINE

## 2025-06-09 PROCEDURE — 96413 CHEMO IV INFUSION 1 HR: CPT

## 2025-06-09 PROCEDURE — 96375 TX/PRO/DX INJ NEW DRUG ADDON: CPT

## 2025-06-09 PROCEDURE — 250N000013 HC RX MED GY IP 250 OP 250 PS 637: Performed by: INTERNAL MEDICINE

## 2025-06-09 RX ORDER — HEPARIN SODIUM (PORCINE) LOCK FLUSH IV SOLN 100 UNIT/ML 100 UNIT/ML
5 SOLUTION INTRAVENOUS
Status: DISCONTINUED | OUTPATIENT
Start: 2025-06-09 | End: 2025-06-09 | Stop reason: HOSPADM

## 2025-06-09 RX ORDER — DIPHENHYDRAMINE HCL 25 MG
50 CAPSULE ORAL
Status: DISCONTINUED | OUTPATIENT
Start: 2025-06-09 | End: 2025-06-09 | Stop reason: HOSPADM

## 2025-06-09 RX ADMIN — DEXAMETHASONE SODIUM PHOSPHATE: 10 INJECTION, SOLUTION INTRAMUSCULAR; INTRAVENOUS at 10:43

## 2025-06-09 RX ADMIN — DIPHENHYDRAMINE HYDROCHLORIDE 50 MG: 25 CAPSULE ORAL at 10:30

## 2025-06-09 RX ADMIN — Medication 5 ML: at 12:20

## 2025-06-09 RX ADMIN — SODIUM CHLORIDE 250 ML: 0.9 INJECTION, SOLUTION INTRAVENOUS at 10:40

## 2025-06-09 RX ADMIN — PACLITAXEL 161 MG: 6 INJECTION, SOLUTION INTRAVENOUS at 11:17

## 2025-06-09 RX ADMIN — FAMOTIDINE 20 MG: 10 INJECTION, SOLUTION INTRAVENOUS at 10:35

## 2025-06-09 ASSESSMENT — PAIN SCALES - GENERAL: PAINLEVEL_OUTOF10: MILD PAIN (2)

## 2025-06-09 NOTE — PROGRESS NOTES
Infusion Nursing Note:  Florinda Keller presents today for C11D1 Taxol.    Patient seen by provider today: No   present during visit today: Not Applicable.    Note: N/A.      Intravenous Access:  Implanted Port.    Treatment Conditions:  Lab Results   Component Value Date    HGB 13.1 06/08/2025    WBC 4.5 06/08/2025    ANEU 2.2 06/08/2025     06/08/2025        Results reviewed, labs MET treatment parameters, ok to proceed with treatment.      Post Infusion Assessment:  Patient tolerated infusion without incident.  Blood return noted pre and post infusion.  Site patent and intact, free from redness, edema or discomfort.  No evidence of extravasations.  Access discontinued per protocol.       Discharge Plan:   Discharge instructions reviewed with: Patient and Family.  Patient and/or family verbalized understanding of discharge instructions and all questions answered.  AVS to patient via [employer].  Patient will return 6/16/25 for next appointment.   Patient discharged in stable condition accompanied by: self and father.  Departure Mode: Ambulatory.      Latrell Escalante RN

## 2025-06-12 ENCOUNTER — VIRTUAL VISIT (OUTPATIENT)
Dept: ONCOLOGY | Facility: HOSPITAL | Age: 45
End: 2025-06-12
Attending: PSYCHOLOGIST
Payer: COMMERCIAL

## 2025-06-12 ENCOUNTER — ANCILLARY PROCEDURE (OUTPATIENT)
Dept: ULTRASOUND IMAGING | Facility: CLINIC | Age: 45
End: 2025-06-12
Payer: COMMERCIAL

## 2025-06-12 ENCOUNTER — RESULTS FOLLOW-UP (OUTPATIENT)
Dept: ONCOLOGY | Facility: CLINIC | Age: 45
End: 2025-06-12

## 2025-06-12 VITALS — HEIGHT: 68 IN | WEIGHT: 183 LBS | BODY MASS INDEX: 27.74 KG/M2

## 2025-06-12 DIAGNOSIS — F43.22 ADJUSTMENT DISORDER WITH ANXIETY: Primary | ICD-10-CM

## 2025-06-12 PROCEDURE — 76881 US COMPL JOINT R-T W/IMG: CPT | Performed by: RADIOLOGY

## 2025-06-12 RX ORDER — ALBUTEROL SULFATE 0.83 MG/ML
2.5 SOLUTION RESPIRATORY (INHALATION)
Status: CANCELLED | OUTPATIENT
Start: 2025-06-16

## 2025-06-12 RX ORDER — HEPARIN SODIUM,PORCINE 10 UNIT/ML
5-20 VIAL (ML) INTRAVENOUS DAILY PRN
Status: CANCELLED | OUTPATIENT
Start: 2025-06-16

## 2025-06-12 RX ORDER — LORAZEPAM 2 MG/ML
0.5 INJECTION INTRAMUSCULAR EVERY 4 HOURS PRN
Status: CANCELLED | OUTPATIENT
Start: 2025-06-16

## 2025-06-12 RX ORDER — DIPHENHYDRAMINE HYDROCHLORIDE 50 MG/ML
50 INJECTION, SOLUTION INTRAMUSCULAR; INTRAVENOUS
Status: CANCELLED
Start: 2025-06-16

## 2025-06-12 RX ORDER — DIPHENHYDRAMINE HCL 25 MG
50 CAPSULE ORAL
Status: CANCELLED
Start: 2025-06-16

## 2025-06-12 RX ORDER — ALBUTEROL SULFATE 90 UG/1
1-2 INHALANT RESPIRATORY (INHALATION)
Status: CANCELLED
Start: 2025-06-16

## 2025-06-12 RX ORDER — MEPERIDINE HYDROCHLORIDE 25 MG/ML
25 INJECTION INTRAMUSCULAR; INTRAVENOUS; SUBCUTANEOUS
Status: CANCELLED | OUTPATIENT
Start: 2025-06-16

## 2025-06-12 RX ORDER — EPINEPHRINE 1 MG/ML
0.3 INJECTION, SOLUTION, CONCENTRATE INTRAVENOUS EVERY 5 MIN PRN
Status: CANCELLED | OUTPATIENT
Start: 2025-06-16

## 2025-06-12 RX ORDER — HEPARIN SODIUM (PORCINE) LOCK FLUSH IV SOLN 100 UNIT/ML 100 UNIT/ML
5 SOLUTION INTRAVENOUS
Status: CANCELLED | OUTPATIENT
Start: 2025-06-16

## 2025-06-12 RX ORDER — DIPHENHYDRAMINE HYDROCHLORIDE 50 MG/ML
25 INJECTION, SOLUTION INTRAMUSCULAR; INTRAVENOUS
Status: CANCELLED
Start: 2025-06-16

## 2025-06-12 ASSESSMENT — PAIN SCALES - GENERAL: PAINLEVEL_OUTOF10: MODERATE PAIN (5)

## 2025-06-12 NOTE — PROGRESS NOTES
Virtual Visit Details    Type of service:  Telephone Visit   Phone call duration: 46 minutes   Originating Location (pt. Location): Home    Distant Location (provider location):  On-site  Telephone visit completed due to the patient did not consent to a video visit.

## 2025-06-12 NOTE — NURSING NOTE
Patient reviewed medications and allergies in [facility] during e-check in and said everything looked correct.      Current patient location: [address] [place] [id number]    Is the patient currently in the state of [place]? YES    Visit mode: TELEPHONE    If the visit is dropped, the patient can be reconnected by:TELEPHONE VISIT: Phone number:   Telephone Information:   Mobile [phone]       Will anyone else be joining the visit? NO  (If patient encounters technical issues they should call [phone]    Are changes needed to the allergy or medication list? No    Are refills needed on medications prescribed by this physician? NO    Rooming Documentation:  Questionnaire(s) completed    Reason for visit: RECHECK (Discuss Emotional Freedom /Tips for insomnia /How to set Boundaries with others-words, remove from situations, )    Maribeth BOWMANF

## 2025-06-15 ENCOUNTER — INFUSION THERAPY VISIT (OUTPATIENT)
Dept: INFUSION THERAPY | Facility: CLINIC | Age: 45
End: 2025-06-15
Attending: INTERNAL MEDICINE
Payer: COMMERCIAL

## 2025-06-15 DIAGNOSIS — Z17.0 MALIGNANT NEOPLASM OF OVERLAPPING SITES OF LEFT BREAST IN FEMALE, ESTROGEN RECEPTOR POSITIVE (H): Primary | ICD-10-CM

## 2025-06-15 DIAGNOSIS — C50.812 MALIGNANT NEOPLASM OF OVERLAPPING SITES OF LEFT BREAST IN FEMALE, ESTROGEN RECEPTOR POSITIVE (H): Primary | ICD-10-CM

## 2025-06-15 LAB
BASOPHILS # BLD AUTO: 0 10E3/UL (ref 0–0.2)
BASOPHILS NFR BLD AUTO: 1 %
EOSINOPHIL # BLD AUTO: 0 10E3/UL (ref 0–0.7)
EOSINOPHIL NFR BLD AUTO: 1 %
ERYTHROCYTE [DISTWIDTH] IN BLOOD BY AUTOMATED COUNT: 13.8 % (ref 10–15)
HCT VFR BLD AUTO: 37 % (ref 35–47)
HGB BLD-MCNC: 12.3 G/DL (ref 11.7–15.7)
IMM GRANULOCYTES # BLD: 0 10E3/UL
IMM GRANULOCYTES NFR BLD: 1 %
LYMPHOCYTES # BLD AUTO: 1.5 10E3/UL (ref 0.8–5.3)
LYMPHOCYTES NFR BLD AUTO: 39 %
MCH RBC QN AUTO: 30.1 PG (ref 26.5–33)
MCHC RBC AUTO-ENTMCNC: 33.2 G/DL (ref 31.5–36.5)
MCV RBC AUTO: 91 FL (ref 78–100)
MONOCYTES # BLD AUTO: 0.2 10E3/UL (ref 0–1.3)
MONOCYTES NFR BLD AUTO: 4 %
NEUTROPHILS # BLD AUTO: 2.2 10E3/UL (ref 1.6–8.3)
NEUTROPHILS NFR BLD AUTO: 54 %
NRBC # BLD AUTO: 0 10E3/UL
NRBC BLD AUTO-RTO: 0 /100
PLATELET # BLD AUTO: 258 10E3/UL (ref 150–450)
RBC # BLD AUTO: 4.08 10E6/UL (ref 3.8–5.2)
WBC # BLD AUTO: 4 10E3/UL (ref 4–11)

## 2025-06-15 PROCEDURE — 36415 COLL VENOUS BLD VENIPUNCTURE: CPT | Performed by: INTERNAL MEDICINE

## 2025-06-15 PROCEDURE — 85004 AUTOMATED DIFF WBC COUNT: CPT | Performed by: INTERNAL MEDICINE

## 2025-06-15 RX ORDER — PROCHLORPERAZINE MALEATE 10 MG
10 TABLET ORAL EVERY 6 HOURS PRN
Qty: 30 TABLET | Refills: 2 | Status: SHIPPED | OUTPATIENT
Start: 2025-06-15

## 2025-06-15 NOTE — PROGRESS NOTES
Nursing Note:  Florinda ARELLANO Krishna presents today for labs.    Patient seen by provider today: No   present during visit today: Not Applicable.    Note: N/A.    Intravenous Access:  Lab draw site right AC, Needle type BF, Gauge 23.  Labs drawn without difficulty.    Discharge Plan:   Patient was sent to home for 6/16/25 appointment.    Klaus Magana RN

## 2025-06-16 ENCOUNTER — INFUSION THERAPY VISIT (OUTPATIENT)
Dept: INFUSION THERAPY | Facility: CLINIC | Age: 45
End: 2025-06-16
Payer: COMMERCIAL

## 2025-06-16 VITALS
DIASTOLIC BLOOD PRESSURE: 85 MMHG | RESPIRATION RATE: 16 BRPM | TEMPERATURE: 98.3 F | HEIGHT: 68 IN | BODY MASS INDEX: 27.98 KG/M2 | HEART RATE: 79 BPM | SYSTOLIC BLOOD PRESSURE: 127 MMHG | OXYGEN SATURATION: 98 % | WEIGHT: 184.6 LBS

## 2025-06-16 DIAGNOSIS — C50.812 MALIGNANT NEOPLASM OF OVERLAPPING SITES OF LEFT BREAST IN FEMALE, ESTROGEN RECEPTOR POSITIVE (H): Primary | ICD-10-CM

## 2025-06-16 DIAGNOSIS — Z17.0 MALIGNANT NEOPLASM OF OVERLAPPING SITES OF LEFT BREAST IN FEMALE, ESTROGEN RECEPTOR POSITIVE (H): Primary | ICD-10-CM

## 2025-06-16 PROCEDURE — 250N000011 HC RX IP 250 OP 636: Performed by: INTERNAL MEDICINE

## 2025-06-16 PROCEDURE — 96413 CHEMO IV INFUSION 1 HR: CPT

## 2025-06-16 PROCEDURE — 258N000003 HC RX IP 258 OP 636: Performed by: INTERNAL MEDICINE

## 2025-06-16 PROCEDURE — 96375 TX/PRO/DX INJ NEW DRUG ADDON: CPT

## 2025-06-16 PROCEDURE — 250N000013 HC RX MED GY IP 250 OP 250 PS 637: Performed by: INTERNAL MEDICINE

## 2025-06-16 RX ORDER — ONDANSETRON 8 MG/1
8 TABLET, FILM COATED ORAL EVERY 8 HOURS PRN
Qty: 30 TABLET | Refills: 2 | Status: SHIPPED | OUTPATIENT
Start: 2025-06-23

## 2025-06-16 RX ORDER — HEPARIN SODIUM (PORCINE) LOCK FLUSH IV SOLN 100 UNIT/ML 100 UNIT/ML
5 SOLUTION INTRAVENOUS
Status: DISCONTINUED | OUTPATIENT
Start: 2025-06-16 | End: 2025-06-16 | Stop reason: HOSPADM

## 2025-06-16 RX ORDER — DEXAMETHASONE 4 MG/1
8 TABLET ORAL DAILY
Qty: 6 TABLET | Refills: 3 | Status: SHIPPED | OUTPATIENT
Start: 2025-06-23

## 2025-06-16 RX ORDER — DIPHENHYDRAMINE HYDROCHLORIDE 50 MG/ML
50 INJECTION, SOLUTION INTRAMUSCULAR; INTRAVENOUS ONCE
Status: COMPLETED | OUTPATIENT
Start: 2025-06-16 | End: 2025-06-16

## 2025-06-16 RX ORDER — PROCHLORPERAZINE MALEATE 10 MG
10 TABLET ORAL EVERY 6 HOURS PRN
Qty: 30 TABLET | Refills: 2 | Status: SHIPPED | OUTPATIENT
Start: 2025-06-23

## 2025-06-16 RX ORDER — DIPHENHYDRAMINE HCL 25 MG
50 CAPSULE ORAL ONCE
Status: COMPLETED | OUTPATIENT
Start: 2025-06-16 | End: 2025-06-16

## 2025-06-16 RX ADMIN — SODIUM CHLORIDE 250 ML: 0.9 INJECTION, SOLUTION INTRAVENOUS at 10:56

## 2025-06-16 RX ADMIN — DIPHENHYDRAMINE HYDROCHLORIDE 50 MG: 25 CAPSULE ORAL at 10:55

## 2025-06-16 RX ADMIN — PACLITAXEL 162 MG: 6 INJECTION, SOLUTION INTRAVENOUS at 11:28

## 2025-06-16 RX ADMIN — FAMOTIDINE 20 MG: 10 INJECTION, SOLUTION INTRAVENOUS at 11:15

## 2025-06-16 RX ADMIN — Medication 5 ML: at 12:31

## 2025-06-16 RX ADMIN — DEXAMETHASONE SODIUM PHOSPHATE: 10 INJECTION, SOLUTION INTRAMUSCULAR; INTRAVENOUS at 10:58

## 2025-06-16 ASSESSMENT — PAIN SCALES - GENERAL: PAINLEVEL_OUTOF10: MODERATE PAIN (5)

## 2025-06-16 NOTE — PROGRESS NOTES
Infusion Nursing Note:  Florinda Keller presents today for C12D1 Taxol.    Patient seen by provider today: No   present during visit today: Not Applicable.    Note: Patient reports no significant changes since taxol infusion 1 week ago. Continues with burning feeling in hands for 1 day post chemo, then resolves on its own. Mild constipation managed with diet. Denies fevers/chills, cough/SOB, nausea/vomiting or skin concerns. Patient uses own supply of ice to apply to hands and feet during taxol infusion. Plan not authorized, however ok to proceed per Sonny Alves as this was an added treatment plan day and was previously authorized.      Intravenous Access:  Implanted Port.    Treatment Conditions:  Lab Results   Component Value Date    HGB 12.3 06/15/2025    WBC 4.0 06/15/2025    ANEU 2.2 06/15/2025     06/15/2025     Results reviewed, labs MET treatment parameters, ok to proceed with treatment.      Post Infusion Assessment:  Patient tolerated infusion without incident.  Blood return noted pre and post infusion.  Site patent and intact, free from redness, edema or discomfort.  No evidence of extravasations.  Access discontinued per protocol.       Discharge Plan:   Discharge instructions reviewed with: Patient.  Patient and/or family verbalized understanding of discharge instructions and all questions answered.  AVS to patient via [employer].  Patient will return 6/22 & 6/23 for next appointment.   Patient discharged in stable condition accompanied by: friend.  Departure Mode: Ambulatory.      Afsaneh Malik RN

## 2025-06-22 ENCOUNTER — INFUSION THERAPY VISIT (OUTPATIENT)
Dept: INFUSION THERAPY | Facility: CLINIC | Age: 45
End: 2025-06-22
Attending: NURSE PRACTITIONER
Payer: COMMERCIAL

## 2025-06-22 DIAGNOSIS — C50.812 MALIGNANT NEOPLASM OF OVERLAPPING SITES OF LEFT BREAST IN FEMALE, ESTROGEN RECEPTOR POSITIVE (H): Primary | ICD-10-CM

## 2025-06-22 DIAGNOSIS — Z17.0 MALIGNANT NEOPLASM OF OVERLAPPING SITES OF LEFT BREAST IN FEMALE, ESTROGEN RECEPTOR POSITIVE (H): Primary | ICD-10-CM

## 2025-06-22 LAB
ALBUMIN SERPL BCG-MCNC: 4.1 G/DL (ref 3.5–5.2)
ALP SERPL-CCNC: 48 U/L (ref 40–150)
ALT SERPL W P-5'-P-CCNC: 35 U/L (ref 0–50)
ANION GAP SERPL CALCULATED.3IONS-SCNC: 10 MMOL/L (ref 7–15)
AST SERPL W P-5'-P-CCNC: 23 U/L (ref 0–45)
BASOPHILS # BLD AUTO: 0 10E3/UL (ref 0–0.2)
BASOPHILS NFR BLD AUTO: 1 %
BILIRUB SERPL-MCNC: 1.2 MG/DL
BUN SERPL-MCNC: 6.9 MG/DL (ref 6–20)
CALCIUM SERPL-MCNC: 9.2 MG/DL (ref 8.8–10.4)
CHLORIDE SERPL-SCNC: 102 MMOL/L (ref 98–107)
CREAT SERPL-MCNC: 0.66 MG/DL (ref 0.51–0.95)
EGFRCR SERPLBLD CKD-EPI 2021: >90 ML/MIN/1.73M2
EOSINOPHIL # BLD AUTO: 0.1 10E3/UL (ref 0–0.7)
EOSINOPHIL NFR BLD AUTO: 1 %
ERYTHROCYTE [DISTWIDTH] IN BLOOD BY AUTOMATED COUNT: 14 % (ref 10–15)
GLUCOSE SERPL-MCNC: 105 MG/DL (ref 70–99)
HCO3 SERPL-SCNC: 26 MMOL/L (ref 22–29)
HCT VFR BLD AUTO: 38.1 % (ref 35–47)
HGB BLD-MCNC: 12.7 G/DL (ref 11.7–15.7)
IMM GRANULOCYTES # BLD: 0 10E3/UL
IMM GRANULOCYTES NFR BLD: 1 %
LYMPHOCYTES # BLD AUTO: 2 10E3/UL (ref 0.8–5.3)
LYMPHOCYTES NFR BLD AUTO: 47 %
MCH RBC QN AUTO: 30.3 PG (ref 26.5–33)
MCHC RBC AUTO-ENTMCNC: 33.3 G/DL (ref 31.5–36.5)
MCV RBC AUTO: 91 FL (ref 78–100)
MONOCYTES # BLD AUTO: 0.2 10E3/UL (ref 0–1.3)
MONOCYTES NFR BLD AUTO: 4 %
NEUTROPHILS # BLD AUTO: 1.9 10E3/UL (ref 1.6–8.3)
NEUTROPHILS NFR BLD AUTO: 46 %
NRBC # BLD AUTO: 0 10E3/UL
NRBC BLD AUTO-RTO: 0 /100
PLATELET # BLD AUTO: 261 10E3/UL (ref 150–450)
POTASSIUM SERPL-SCNC: 4.4 MMOL/L (ref 3.4–5.3)
PROT SERPL-MCNC: 6.4 G/DL (ref 6.4–8.3)
RBC # BLD AUTO: 4.19 10E6/UL (ref 3.8–5.2)
SODIUM SERPL-SCNC: 138 MMOL/L (ref 135–145)
WBC # BLD AUTO: 4.2 10E3/UL (ref 4–11)

## 2025-06-22 PROCEDURE — 82040 ASSAY OF SERUM ALBUMIN: CPT | Performed by: INTERNAL MEDICINE

## 2025-06-22 PROCEDURE — 36415 COLL VENOUS BLD VENIPUNCTURE: CPT | Performed by: INTERNAL MEDICINE

## 2025-06-22 PROCEDURE — 85004 AUTOMATED DIFF WBC COUNT: CPT | Performed by: INTERNAL MEDICINE

## 2025-06-22 NOTE — PROGRESS NOTES
Nursing Note:  Florinda ARELLANO Krishna presents today for Peripheral Labs.    Patient seen by provider today: No   present during visit today: Not Applicable.    Note: N/A.    Intravenous Access:  Lab draw site right arm, Needle type butterfly, Gauge 23.  Labs drawn without difficulty.    Discharge Plan:   Patient was discharged.    Klaus Magana RN

## 2025-06-23 ENCOUNTER — INFUSION THERAPY VISIT (OUTPATIENT)
Dept: INFUSION THERAPY | Facility: CLINIC | Age: 45
End: 2025-06-23
Attending: NURSE PRACTITIONER
Payer: COMMERCIAL

## 2025-06-23 ENCOUNTER — DOCUMENTATION ONLY (OUTPATIENT)
Dept: ONCOLOGY | Facility: CLINIC | Age: 45
End: 2025-06-23

## 2025-06-23 ENCOUNTER — ONCOLOGY VISIT (OUTPATIENT)
Dept: ONCOLOGY | Facility: CLINIC | Age: 45
End: 2025-06-23
Attending: NURSE PRACTITIONER
Payer: COMMERCIAL

## 2025-06-23 VITALS
OXYGEN SATURATION: 97 % | SYSTOLIC BLOOD PRESSURE: 115 MMHG | RESPIRATION RATE: 16 BRPM | TEMPERATURE: 98.7 F | WEIGHT: 183 LBS | DIASTOLIC BLOOD PRESSURE: 59 MMHG | HEART RATE: 84 BPM | BODY MASS INDEX: 28.22 KG/M2

## 2025-06-23 DIAGNOSIS — Z17.0 MALIGNANT NEOPLASM OF OVERLAPPING SITES OF LEFT BREAST IN FEMALE, ESTROGEN RECEPTOR POSITIVE (H): Primary | ICD-10-CM

## 2025-06-23 DIAGNOSIS — C50.812 MALIGNANT NEOPLASM OF OVERLAPPING SITES OF LEFT BREAST IN FEMALE, ESTROGEN RECEPTOR POSITIVE (H): Primary | ICD-10-CM

## 2025-06-23 PROCEDURE — 96413 CHEMO IV INFUSION 1 HR: CPT

## 2025-06-23 PROCEDURE — 96375 TX/PRO/DX INJ NEW DRUG ADDON: CPT

## 2025-06-23 PROCEDURE — 250N000011 HC RX IP 250 OP 636: Performed by: INTERNAL MEDICINE

## 2025-06-23 PROCEDURE — 96367 TX/PROPH/DG ADDL SEQ IV INF: CPT

## 2025-06-23 PROCEDURE — 96372 THER/PROPH/DIAG INJ SC/IM: CPT | Performed by: INTERNAL MEDICINE

## 2025-06-23 PROCEDURE — 258N000003 HC RX IP 258 OP 636: Performed by: INTERNAL MEDICINE

## 2025-06-23 PROCEDURE — 96411 CHEMO IV PUSH ADDL DRUG: CPT

## 2025-06-23 PROCEDURE — 96377 APPLICATON ON-BODY INJECTOR: CPT | Mod: XU

## 2025-06-23 PROCEDURE — 99213 OFFICE O/P EST LOW 20 MIN: CPT | Mod: 25 | Performed by: NURSE PRACTITIONER

## 2025-06-23 PROCEDURE — 99213 OFFICE O/P EST LOW 20 MIN: CPT | Performed by: NURSE PRACTITIONER

## 2025-06-23 RX ORDER — DOXORUBICIN HYDROCHLORIDE 2 MG/ML
60 INJECTION, SOLUTION INTRAVENOUS ONCE
Status: COMPLETED | OUTPATIENT
Start: 2025-06-23 | End: 2025-06-23

## 2025-06-23 RX ORDER — PALONOSETRON 0.05 MG/ML
0.25 INJECTION, SOLUTION INTRAVENOUS ONCE
Status: COMPLETED | OUTPATIENT
Start: 2025-06-23 | End: 2025-06-23

## 2025-06-23 RX ORDER — HEPARIN SODIUM (PORCINE) LOCK FLUSH IV SOLN 100 UNIT/ML 100 UNIT/ML
5 SOLUTION INTRAVENOUS
Status: DISCONTINUED | OUTPATIENT
Start: 2025-06-23 | End: 2025-06-23 | Stop reason: HOSPADM

## 2025-06-23 RX ADMIN — PALONOSETRON HYDROCHLORIDE 0.25 MG: 0.25 INJECTION INTRAVENOUS at 09:21

## 2025-06-23 RX ADMIN — Medication 5 ML: at 10:33

## 2025-06-23 RX ADMIN — CYCLOPHOSPHAMIDE 1210 MG: 1 INJECTION, POWDER, FOR SOLUTION INTRAVENOUS; ORAL at 10:02

## 2025-06-23 RX ADMIN — DOXORUBICIN HYDROCHLORIDE 120 MG: 2 INJECTION, SOLUTION INTRAVENOUS at 09:50

## 2025-06-23 RX ADMIN — PEGFILGRASTIM 6 MG: KIT SUBCUTANEOUS at 10:10

## 2025-06-23 RX ADMIN — SODIUM CHLORIDE 250 ML: 0.9 INJECTION, SOLUTION INTRAVENOUS at 09:20

## 2025-06-23 RX ADMIN — FOSAPREPITANT: 150 INJECTION, POWDER, LYOPHILIZED, FOR SOLUTION INTRAVENOUS at 09:27

## 2025-06-23 ASSESSMENT — PAIN SCALES - GENERAL: PAINLEVEL_OUTOF10: MODERATE PAIN (4)

## 2025-06-23 NOTE — PROGRESS NOTES
Take Home Medication Teaching    Patient was educated on the following oral medications: dexamethasone (scheduled; days 1-3), prochlorperazine (as needed; days 1-3), and ondansetron (as needed; after day 3) on June 23, 2025. Teaching provided to patient included indication, dose, administration, adverse effects and side effect management. Written materials were provided and patient was given the opportunity to ask questions. Patient verbalized understanding of the information presented.      Cordelia Snow  Pharmacy Intern  Oral Chemotherapy Monitoring Program  [facility]   [phone]

## 2025-06-23 NOTE — PROGRESS NOTES
Infusion Nursing Note:  Florinda Keller presents today for C1D1 Adriamycin, Cytoxan, neulasta onpro.    Patient seen by provider today: Yes: Maciel Queen NP.   present during visit today: Not Applicable.    Note: Patient seen and assessed in clinic prior to infusion by Maciel Queen NP. Per ab St to proceed with treatment today. Patient verbalizes understanding of correct dosing/schedule of post-chemo oral dexamethasone as ordered, denies need for refill.    New chemotherapy regimen today. Chemotherapy teaching, side effects, and schedule reviewed with patient. General and individual chemotherapy side effects reviewed with patient including fatigue, immunosuppression, alopecia, nausea/vomiting, constipation/diarrhea and cardiotoxicity. Pt instructed to call care coordinator, triage (or MD on call if after hours/weekends) with chills/temp >=100.5, questions/concerns. Pt stated understanding of plan.     ONPRO  Was placed on patient's: back of right arm.    Was placed at 1015 AM    Podpal used: Yes    ONPRO injector device Lot number: [id number]    Patient education included: what patient can expect after application, what colored lights mean on the device, when to remove device, when and where to call with questions or issues, all patients questions answered, and that Neulasta administration will occur at 1:15pm on 6/24/25. Patient declined to watch neulasta onpro instructional video.    Patient tolerated administration well.       Intravenous Access:  Implanted Port.    Treatment Conditions:  Lab Results   Component Value Date    HGB 12.7 06/22/2025    WBC 4.2 06/22/2025    ANEU 1.9 06/22/2025     06/22/2025        Lab Results   Component Value Date     06/22/2025    POTASSIUM 4.4 06/22/2025    CR 0.66 06/22/2025    SJ 9.2 06/22/2025    BILITOTAL 1.2 06/22/2025    ALBUMIN 4.1 06/22/2025    ALT 35 06/22/2025    AST 23 06/22/2025     Results reviewed, labs MET treatment parameters, ok to proceed  with treatment.  ECHO/MUGA completed 3/6/25  EF 55-60%.      Post Infusion Assessment:  Patient tolerated infusion without incident.  Patient tolerated injection without incident.  Blood return noted pre and post infusion.  Blood return noted during administration every 2cc during adriamycin push per protocol.  Site patent and intact, free from redness, edema or discomfort.  No evidence of extravasations.  Access discontinued per protocol.       Discharge Plan:   Discharge instructions reviewed with: Patient.  Patient and/or family verbalized understanding of discharge instructions and all questions answered.  AVS to patient via [employer].  Patient will return 7/6 & 7/7 for next appointment.   Patient discharged in stable condition accompanied by: friend.  Departure Mode: Ambulatory.      Afsaneh Malik RN

## 2025-06-23 NOTE — PROGRESS NOTES
Oncology/Hematology Visit Note  Jun 23, 2025    Reason for Visit: follow up of left breast cancer  Patient follows with Dr. Marciano oliveira IIIA, cT3-N1-MX, grade 2 invasive lobular carcinoma of the left upper outer breast, ER positive, HI positive, HER2 negative, MammaPrint high risk, Ki-67 = 17%   Cerianna PET -no distant metastatic disease  Given the size of the tumor patient is currently on neoadjuvant treatment  Completed 12 weeks of Taxol patient to start today DD Adriamycin Cytoxan    Interval History:  Overall patient reports reports feeling well.  Denies fever chills sweats cough shortness of breath chest pain nausea vomiting diarrhea abdominal pain or bleeding    Review of Systems:  14 point ROS of systems including Constitutional, Eyes, Respiratory, Cardiovascular, Gastroenterology, Genitourinary, Integumentary, Muscularskeletal, Psychiatric were all negative except for pertinent positives noted in my HPI.      Physical Examination:  General: The patient is a pleasant female in no acute distress.  /59   Pulse 84   Temp 98.7  F (37.1  C) (Oral)   Resp 16   Wt 83 kg (183 lb)   LMP 04/15/2025 (Approximate)   SpO2 97%   BMI 28.22 kg/m    HEENT: EOMI, PERRL. Sclerae are anicteric. Oral mucosa is pink and moist with no lesions or thrush.   Lymph: Neck is supple with no lymphadenopathy in the cervical or supraclavicular areas.   Heart: Regular rate and rhythm.   Lungs: Clear to auscultation bilaterally.   GI: Bowel sounds present, soft, nontender with no palpable hepatosplenomegaly or masses.   Extremities: No lower extremity edema noted bilaterally.   Skin: No rashes, petechiae, or bruising noted on exposed skin.  Bilateral breast-left breast higher and smaller than right breast.  Palpable large and firm mass over all of breast with further softening and wrinkling of skin     Laboratory Data:  CBC and CMP      Assessment and Plan:    Left Breast cancer  Follows with Dr. Tariq  Currently getting  neoadjuvant treatment with Taxol and dose dense Adriamycin and Cytoxan  -Patient finished 12 weeks of Taxol  - This patient will start dose dense Adriamycin Cytoxan.  Management potential side effects reviewed patient agrees to proceed with treatment  Schedule with cycle 2 with Dr. Tariq  Scheduled for bilateral breast MRI after Adriamycin and Cytoxan and follow-up appointment with Dr. Tariq     Please call clinic with any changes in health condition or questions    ARASH Diaz [facility] [place]     Chart documentation with Dragon Voice recognition Software. Although reviewed after completion, some words and grammatical errors may remain.

## 2025-06-23 NOTE — LETTER
6/23/2025      Florinda Keller  [address] [address]      Dear Colleague,    Thank you for referring your patient, Florinda Keller, to the [facility]. Please see a copy of my visit note below.    Oncology/Hematology Visit Note  Jun 23, 2025    Reason for Visit: follow up of left breast cancer  Patient follows with Dr. Marciano oliveira IIIA, cT3-N1-MX, grade 2 invasive lobular carcinoma of the left upper outer breast, ER positive, CT positive, HER2 negative, MammaPrint high risk, Ki-67 = 17%   Cerianna PET -no distant metastatic disease  Given the size of the tumor patient is currently on neoadjuvant treatment  Completed 12 weeks of Taxol patient to start today DD Adriamycin Cytoxan    Interval History:  Overall patient reports reports feeling well.  Denies fever chills sweats cough shortness of breath chest pain nausea vomiting diarrhea abdominal pain or bleeding    Review of Systems:  14 point ROS of systems including Constitutional, Eyes, Respiratory, Cardiovascular, Gastroenterology, Genitourinary, Integumentary, Muscularskeletal, Psychiatric were all negative except for pertinent positives noted in my HPI.      Physical Examination:  General: The patient is a pleasant female in no acute distress.  /59   Pulse 84   Temp 98.7  F (37.1  C) (Oral)   Resp 16   Wt 83 kg (183 lb)   LMP 04/15/2025 (Approximate)   SpO2 97%   BMI 28.22 kg/m    HEENT: EOMI, PERRL. Sclerae are anicteric. Oral mucosa is pink and moist with no lesions or thrush.   Lymph: Neck is supple with no lymphadenopathy in the cervical or supraclavicular areas.   Heart: Regular rate and rhythm.   Lungs: Clear to auscultation bilaterally.   GI: Bowel sounds present, soft, nontender with no palpable hepatosplenomegaly or masses.   Extremities: No lower extremity edema noted bilaterally.   Skin: No rashes, petechiae, or bruising noted on exposed skin.  Bilateral breast-left breast higher and smaller than  right breast.  Palpable large and firm mass over all of breast with further softening and wrinkling of skin     Laboratory Data:  CBC and CMP      Assessment and Plan:    Left Breast cancer  Follows with Dr. Tariq  Currently getting neoadjuvant treatment with Taxol and dose dense Adriamycin and Cytoxan  -Patient finished 12 weeks of Taxol  - This patient will start dose dense Adriamycin Cytoxan.  Management potential side effects reviewed patient agrees to proceed with treatment  Schedule with cycle 2 with Dr. Tariq  Scheduled for bilateral breast MRI after Adriamycin and Cytoxan and follow-up appointment with Dr. Tariq     Please call clinic with any changes in health condition or questions    ARASH Diaz CNP  [facility]     Chart documentation with Dragon Voice recognition Software. Although reviewed after completion, some words and grammatical errors may remain.        Again, thank you for allowing me to participate in the care of your patient.        Sincerely,        ARASH Diaz CNP    Electronically signed

## 2025-06-25 ENCOUNTER — ALLIED HEALTH/NURSE VISIT (OUTPATIENT)
Dept: ONCOLOGY | Facility: CLINIC | Age: 45
End: 2025-06-25
Payer: COMMERCIAL

## 2025-06-25 DIAGNOSIS — Z00.6 EXAMINATION OF PARTICIPANT IN CLINICAL TRIAL: Primary | ICD-10-CM

## 2025-06-25 NOTE — PROGRESS NOTES
Blood drawn today per study requirements via port at 0800. No complications or adverse events related to the blood collection.

## 2025-06-25 NOTE — PROGRESS NOTES
Blood drawn today per study requirements via port at 0930. No complications or adverse events related to the blood collection.

## 2025-06-27 ENCOUNTER — THERAPY VISIT (OUTPATIENT)
Dept: OCCUPATIONAL THERAPY | Facility: CLINIC | Age: 45
End: 2025-06-27
Attending: STUDENT IN AN ORGANIZED HEALTH CARE EDUCATION/TRAINING PROGRAM
Payer: COMMERCIAL

## 2025-06-27 DIAGNOSIS — M25.512 LEFT SHOULDER PAIN, UNSPECIFIED CHRONICITY: ICD-10-CM

## 2025-06-27 DIAGNOSIS — G62.0 CHEMOTHERAPY-INDUCED NEUROPATHY: ICD-10-CM

## 2025-06-27 DIAGNOSIS — Z17.0 MALIGNANT NEOPLASM OF OVERLAPPING SITES OF LEFT BREAST IN FEMALE, ESTROGEN RECEPTOR POSITIVE (H): ICD-10-CM

## 2025-06-27 DIAGNOSIS — T45.1X5A CHEMOTHERAPY-INDUCED NEUROPATHY: ICD-10-CM

## 2025-06-27 DIAGNOSIS — C50.812 MALIGNANT NEOPLASM OF OVERLAPPING SITES OF LEFT BREAST IN FEMALE, ESTROGEN RECEPTOR POSITIVE (H): ICD-10-CM

## 2025-06-27 PROCEDURE — 97165 OT EVAL LOW COMPLEX 30 MIN: CPT | Mod: GO | Performed by: OCCUPATIONAL THERAPIST

## 2025-06-27 PROCEDURE — 97140 MANUAL THERAPY 1/> REGIONS: CPT | Mod: GO | Performed by: OCCUPATIONAL THERAPIST

## 2025-06-28 NOTE — PROGRESS NOTES
OCCUPATIONAL THERAPY EVALUATION  Type of Visit: Evaluation        Fall Risk Screen:  Have you fallen 2 or more times in the past year?: No  Have you fallen and had an injury in the past year?: No    Subjective    Have constant pain in my upper trap area. It keeps me awake. Dr. Robertson said it is myofacial restriction.     Presenting condition or subjective complaint: Left trapezius / shoulder pain that radiates down arm  Date of onset:      Relevant medical history: Cancer   Dates & types of surgery: Thyroid removal August 2014    Prior diagnostic imaging/testing results: X-ray; Bone scan; Other Ultrasound.  Note bone scan was for cancer, not for shoulder   Prior therapy history for the same diagnosis, illness or injury: No      Prior Level of Function  Transfers: Independent  Ambulation: Independent  ADL: Independent  IADL: Independent    Living Environment  Social support: With a significant other or spouse   Type of home: House   Stairs to enter the home: Yes 2 Is there a railing: No     Ramp: No   Stairs inside the home: Yes 13 Is there a railing: Yes     Help at home: None  Equipment owned:       Employment: Yes Internal  (sedentary desk job)  Hobbies/Interests: Hiking, golf, swimming    Patient goals for therapy: Sleep better    Pain assessment: Pain present     Objective       EDEMA EVALUATION  Additional history:  Body part affected by edema: upper back on L   If cancer related, treatment: Neoadjuvant  chemo. Pt will have mastectomy and radiation in the future, reconstruction  If not cancer related, problems with veins or cause of swelling:    Distance able to walk:    Time able to stand:    Sensation problems in hands/feet:      Edema etiology: ChemoPt presents with edema in back, specifically thoracic spine area and lateral trunk  FUNCTIONAL SCALES       Cognitive Status Examination  Orientation: Oriented to person, place and time   Level of Consciousness: Alert  Follows Commands and Answers  Questions: 100% of the time  Personal Safety and Judgement: Intact  Memory: Intact    EDEMA  Skin Condition: Non-pitting  Scar: No  Capillary Refill:   Radial Pulse:   Dorsal Pedal Pulse:   Stemmer Sign:   Ulceration: No    GIRTH MEASUREMENTS: Refer to separate girth measurement flowsheet for specific measurements.    VOLUME UE Will take BUE measurements next visit  Right UE Total Volume (mL):    Left UE Total Volume (mL):    UE Limb Comparison:                RANGE OF MOTION: UE ROM WNL  STRENGTH: UE Strength WNL  POSTURE: WNL  PALPATION:   ACTIVITIES OF DAILY LIVING: sleep impaired  BED MOBILITY: Independent  TRANSFERS: Independent  GAIT/LOCOMOTION: Independent  BALANCE: NT no deficit identified  SENSATION:no deficit   VASCULAR: NA  COORDINATION: NA  MUSCLE TONE:     Assessment & Plan   CLINICAL IMPRESSIONS  Medical Diagnosis: History of left breast adenocarcinoma with left upper/mid trapezius tension and tightness, myofascial pain.    Treatment Diagnosis: lymphedema in thoracic spinal area    Impression/Assessment: Pt is a 45 year old female presenting to Occupational Therapy due to upper mid trapezius pain, tightness and edema, myofacial pain, which is constant and interferes with daily tasks, self cares and sleep..  The following significant findings have been identified: Impaired activity tolerance and Pain.  These identified deficits interfere with their ability to perform self care tasks and sleep as compared to previous level of function.   Patient's limitations or Problem List includes: Pain, Increased edema, and Weakness of the  upper and mid back,which interferes with the patient's ability to perform  as compared to previous level of function.    Clinical Decision Making (Complexity):  Assessment of Occupational Performance: 1-3 Performance Deficits  Occupational Performance Limitations: sleep Constant pain interferes with sleep  Clinical Decision Making (Complexity): Low complexity    PLAN OF  CARE  Treatment Interventions:  Interventions: Therapeutic Exercise, Manual Therapy    Long Term Goals   Pt to demo independence in HEP to promote lymphatic flow to reduce edema, decrease pain to allow pt to sleep uninterrupted.   2.    Pt to verbalize understanding of risks of cancer treatment, (surgery,  radiation, chemo), signs, symptoms of lymphedema to watch for, and how to receive care if symptoms occur   3.    Pt to be independent in performing self MLD/brushing to manage lymphedema in trunk/back/upper quadrant   Target date: 8/15/2025      Frequency of Treatment:  1 x week   Duration of Treatment:   4 weeks    Recommended Referrals to Other Professionals: Physical Therapy  Education Assessment: Learner/Method: Patient  Education Comments: Ed on potential for lymphedema in future following surgery, radiation, reconstruction. Explained role of lymphedema therapy,     Risks and benefits of evaluation/treatment have been explained.   Patient/Family/caregiver agrees with Plan of Care.     Evaluation Time:    OT Eval, Low Complexity Minutes (92204): 10       Signing Clinician: Lizzeth Olmedo OT

## 2025-06-30 ENCOUNTER — ONCOLOGY VISIT (OUTPATIENT)
Dept: ONCOLOGY | Facility: CLINIC | Age: 45
End: 2025-06-30
Attending: INTERNAL MEDICINE
Payer: COMMERCIAL

## 2025-06-30 VITALS
DIASTOLIC BLOOD PRESSURE: 69 MMHG | RESPIRATION RATE: 16 BRPM | OXYGEN SATURATION: 100 % | SYSTOLIC BLOOD PRESSURE: 111 MMHG | HEIGHT: 67 IN | WEIGHT: 186.6 LBS | HEART RATE: 83 BPM | BODY MASS INDEX: 29.29 KG/M2

## 2025-06-30 DIAGNOSIS — R11.0 NAUSEA: ICD-10-CM

## 2025-06-30 DIAGNOSIS — Z17.0 MALIGNANT NEOPLASM OF OVERLAPPING SITES OF LEFT BREAST IN FEMALE, ESTROGEN RECEPTOR POSITIVE (H): Primary | ICD-10-CM

## 2025-06-30 DIAGNOSIS — M89.8X9 BONE PAIN DUE TO G-CSF: ICD-10-CM

## 2025-06-30 DIAGNOSIS — C50.812 MALIGNANT NEOPLASM OF OVERLAPPING SITES OF LEFT BREAST IN FEMALE, ESTROGEN RECEPTOR POSITIVE (H): Primary | ICD-10-CM

## 2025-06-30 PROCEDURE — 99213 OFFICE O/P EST LOW 20 MIN: CPT | Performed by: INTERNAL MEDICINE

## 2025-06-30 PROCEDURE — G2211 COMPLEX E/M VISIT ADD ON: HCPCS | Performed by: INTERNAL MEDICINE

## 2025-06-30 PROCEDURE — 99214 OFFICE O/P EST MOD 30 MIN: CPT | Performed by: INTERNAL MEDICINE

## 2025-06-30 ASSESSMENT — PAIN SCALES - GENERAL: PAINLEVEL_OUTOF10: MODERATE PAIN (4)

## 2025-06-30 NOTE — LETTER
"6/30/2025      Florinda Keller  [address] [place] [address]      Dear Colleague,    Thank you for referring your patient, Florinda Keller, to the [facility]. Please see a copy of my visit note below.    Oncology Rooming Note    June 30, 2025 3:17 PM   Florinda Keller is a 45 year old female who presents for:    Chief Complaint   Patient presents with     Oncology Clinic Visit     Initial Vitals: /69   Pulse 83   Resp 16   Ht 1.702 m (5' 7\")   Wt 84.6 kg (186 lb 9.6 oz)   LMP 04/15/2025 (Approximate)   SpO2 100%   BMI 29.23 kg/m   Estimated body mass index is 29.23 kg/m  as calculated from the following:    Height as of this encounter: 1.702 m (5' 7\").    Weight as of this encounter: 84.6 kg (186 lb 9.6 oz). Body surface area is 2 meters squared.  Moderate Pain (4) Comment: Data Unavailable   Patient's last menstrual period was 04/15/2025 (approximate).  Allergies reviewed: Yes  Medications reviewed: Yes    Medications: Medication refills not needed today.  Pharmacy name entered into [employer]: [id number] [place] [address]    Frailty Screening:   Is the patient here for a new oncology consult visit in cancer care? 2. No    PHQ9:  Did this patient require a PHQ9?: No        Linsey Carver MA              [facility]    Hematology/Oncology Established Patient Note      Today's Date: 6/30/2025    Reason for visit: Left breast cancer.    HISTORY OF PRESENT ILLNESS: Florinda Keller is a 44 year old female who presents with the following oncologic history:  1.  1/22/2025: Mammogram at [facility] obtained for increased firmness and swelling of left breast over course of 1 month.  Imaging showed at least a 4.1 cm asymmetry in the left upper outer breast and slight thickening of the left breast.  Ultrasound of left breast showed vague, irregular hypoechoic mass at 1:00, 9 cm from nipple measuring at least 4.3 x 3.8 x 3.1 cm.  No suspicious " lymph nodes within left axilla.  2.  1/31/2025: Ultrasound-guided left breast core biopsy showed grade 2 invasive lobular carcinoma, angiolymphatic invasion absent, associated LCIS present, ER moderate positive at 95%, MO strongly positive at 98%, HER2 negative with IHC = 1+, Ki-67 = 17%.  MammaPrint high risk-1, luminal B, 6% absolute chemo benefit.  3.  2/20/2025: Breast MRI at [facility] showed abnormal non-mass type enhancement measuring 12 x 6 x 11 cm occupying most of anterior mid and lateral LEFT breast with tethering of underlying pectoralis major muscle but no suspicious enhancement within the  muscle itself; additional mass at 3:00 posteriorly on the left appears to represent an intramammary lymph node; mildly enlarged lymph node in the low LEFT axilla was biopsied on 3/11/2025; Sscattered foci of enhancement within the RIGHT breast with the most  discrete of these is a mass measuring up to 1.0 cm in maximal dimension in the central core, anterior to middle depth.  4. 2/25/2025: CT C/A/P and NM bone scan: reportedly negative for metastatic disease. 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  5. 3/11/2025: Left axillary lymph node biopsy positive for metastatic lobular carcinoma of breast, ER positive at 90%, MO positive at 90%, HER-2 negative with IHC = 1+.  6. 3/18/2025: Right breast biopsy at 8:00 negative for malignancy.  7. 3/21/2025: Cerianna PET scan showed Left breast mass 9.6 cm with mild PET Cerianna uptake consistent with known ER+ positive breast cancer; 12 mm left axillary lymph node with mild PET Cerianna uptake consistent with metastasis. Several additional smaller left axillary and subpectoral lymph nodes do not take up PET Cerianna and are indeterminate.  8. 3/31/2025: Started neoadjuvant paclitaxel weekly x 12 weeks followed by dose-dense AC.    INTERVAL HISTORY:  Florinda reports more nausea and fatigue with the ddAC regimen.  She has had bone pain with the GCSF.    REVIEW OF SYSTEMS:   14 point  ROS was reviewed and is negative other than as noted above in HPI.       HOME MEDICATIONS:  Current Outpatient Medications   Medication Sig Dispense Refill     dexAMETHasone (DECADRON) 4 MG tablet Take 2 tablets (8 mg) by mouth daily. Take for 3 days, starting the day after chemo. Take with food. 6 tablet 3     diclofenac (VOLTAREN) 1 % topical gel Apply 4 g topically 4 times daily as needed for moderate pain. 150 g 3     levothyroxine (SYNTHROID/LEVOTHROID) 88 MCG tablet Take 88 mcg by mouth daily       lidocaine-prilocaine (EMLA) 2.5-2.5 % external cream Apply to port site at least 15-30 min prior to port access 30 g 3     ondansetron (ZOFRAN) 8 MG tablet Take 1 tablet (8 mg) by mouth every 8 hours as needed for nausea (vomiting). 30 tablet 2     prochlorperazine (COMPAZINE) 10 MG tablet Take 1 tablet (10 mg) by mouth every 6 hours as needed for nausea or vomiting. 30 tablet 2         ALLERGIES:  Allergies   Allergen Reactions     Animal Dander Cough, Difficulty breathing and Itching     Cat Dander Cough, Difficulty breathing and Itching     Latex      Morphine Nausea and Vomiting     Other (Do Not Use) Nausea and Vomiting     *Darvocet A500         PAST MEDICAL HISTORY:  Past Medical History:   Diagnosis Date     Breast mass, left      Hx of anxiety      Hx of ovarian cyst      Hx of panic disorder      Hypothyroidism      Invasive ductal carcinoma of breast (H)      Neurological disorder      Gynecologic history:  Age of menarche at 13, , age of first pregnancy at 41, breast-fed her child.  Used OCPs for 26 years.  No HRT.    PAST SURGICAL HISTORY:  Past Surgical History:   Procedure Laterality Date     INSERT PORT VASCULAR ACCESS Right 3/28/2025    Procedure: INSERTION, VASCULAR ACCESS PORT;  Surgeon: Malik Maynard MD;  Location: [facility] OR     SKIN BIOPSY       SOFT TISSUE SURGERY Left     hand cyst     THYROIDECTOMY, PARTIAL       WISDOM TOOTH EXTRACTION           SOCIAL HISTORY:  Social History  "    Socioeconomic History     Marital status:      Spouse name: Not on file     Number of children: Not on file     Years of education: Not on file     Highest education level: Not on file   Occupational History     Not on file   Tobacco Use     Smoking status: Never     Smokeless tobacco: Never   Substance and Sexual Activity     Alcohol use: Not on file     Drug use: Never     Sexual activity: Yes     Partners: Male     Birth control/protection: None   Other Topics Concern     Not on file   Social History Narrative     Not on file     Social Drivers of Health     Financial Resource Strain: Not on file   Food Insecurity: Not on file   Transportation Needs: Not on file   Physical Activity: Not on file   Stress: Not on file   Social Connections: Not on file   Interpersonal Safety: Low Risk  (3/28/2025)    Interpersonal Safety      Do you feel physically and emotionally safe where you currently live?: Yes      Within the past 12 months, have you been hit, slapped, kicked or otherwise physically hurt by someone?: No      Within the past 12 months, have you been humiliated or emotionally abused in other ways by your partner or ex-partner?: Not on file   Housing Stability: Not on file   Has 3 year-old child with  Mahogany Neely works for [email or web address] as a .    FAMILY HISTORY:  Family history significant for mother with breast cancer at age 72.  Paternal grandmother with breast cancer age 50's and ovarian cancer at age 85. Maternal aunt with lung cancer.    PHYSICAL EXAM:  Vital signs:  /69   Pulse 83   Resp 16   Ht 1.702 m (5' 7\")   Wt 84.6 kg (186 lb 9.6 oz)   LMP 04/15/2025 (Approximate)   SpO2 100%   BMI 29.23 kg/m     ECO  GENERAL/CONSTITUTIONAL: No acute distress.  EYES:  No scleral icterus.  ENT/MOUTH: Neck supple.  LYMPH: No palpable left axillary adenopathy   BREAST: Left breast smaller than right breast.  Palpable large and firm mass over all of breast with further " additional softening and wrinkling of skin compared to prior exam; breast is movable from posterior chest wall.  RESPIRATORY: No audible cough or wheezing.  GASTROINTESTINAL: No guarding.  No distention.  MUSCULOSKELETAL: Warm and well-perfused, no cyanosis, clubbing, or edema.  NEUROLOGIC: No focal motor deficits. Alert, oriented, answers questions appropriately.  INTEGUMENTARY: No rashes or jaundice.  GAIT: Steady, does not use assistive device      LABS:  CBC RESULTS:   Recent Labs   Lab Test 05/12/25  0809   WBC 6.7   RBC 4.20   HGB 12.9   HCT 37.8   MCV 90   MCH 30.7   MCHC 34.1   RDW 13.1         Last Comprehensive Metabolic Panel:  Sodium   Date Value Ref Range Status   06/22/2025 138 135 - 145 mmol/L Final     Potassium   Date Value Ref Range Status   06/22/2025 4.4 3.4 - 5.3 mmol/L Final     Chloride   Date Value Ref Range Status   06/22/2025 102 98 - 107 mmol/L Final     Carbon Dioxide (CO2)   Date Value Ref Range Status   06/22/2025 26 22 - 29 mmol/L Final     Anion Gap   Date Value Ref Range Status   06/22/2025 10 7 - 15 mmol/L Final     Glucose   Date Value Ref Range Status   06/22/2025 105 (H) 70 - 99 mg/dL Final     Urea Nitrogen   Date Value Ref Range Status   06/22/2025 6.9 6.0 - 20.0 mg/dL Final     Creatinine   Date Value Ref Range Status   06/22/2025 0.66 0.51 - 0.95 mg/dL Final     GFR Estimate   Date Value Ref Range Status   06/22/2025 >90 >60 mL/min/1.73m2 Final     Comment:     eGFR calculated using 2021 CKD-EPI equation.     Calcium   Date Value Ref Range Status   06/22/2025 9.2 8.8 - 10.4 mg/dL Final     Bilirubin Total   Date Value Ref Range Status   06/22/2025 1.2 <=1.2 mg/dL Final     Alkaline Phosphatase   Date Value Ref Range Status   06/22/2025 48 40 - 150 U/L Final     ALT   Date Value Ref Range Status   06/22/2025 35 0 - 50 U/L Final     AST   Date Value Ref Range Status   06/22/2025 23 0 - 45 U/L Final     CA 15-3 = 684     PATHOLOGY:  Reviewed as per  HPI.    IMAGING:  Reviewed as per HPI.    ASSESSMENT/PLAN:  Florinda Keller is a 44 year old female with the following issues:  1.  Clinical prognostic stage IIIA, cT3-N1-MX, grade 2 invasive lobular carcinoma of the left upper outer breast, ER positive, DE positive, HER2 negative, MammaPrint high risk, Ki-67 = 17%  - 3/21/2025 Cerianna PET scan showed uptake within the known large left breast invasive lobular carcinoma, left axillary node, but no other site and no distant metastatic disease.  --Given the very large size of this tumor, she proceeded with attempted downstaging with neoadjuvant chemotherapy.  - Started weekly paclitaxel on 3/31/2025 for planned 12 weeks followed by dose dense Adriamycin and Cytoxan given high risk Mammaprint result. She would then proceed to mastectomy under care of Dr. Nancy Shaikh.  --She will proceed with cycle 2 on 7/7/2025.  --Clinically, she has further reduction in size and softening of the primary breast tumor. It is not fixed to chest wall.  -She will need adjuvant radiation therapy as well as adjuvant endocrine therapy with ovarian suppression therapy with Zoladex injections every 4 weeks in conjunction with an aromatase inhibitor such as anastrozole.  --3/10/2025 CA 15-3 was elevated at 990, now improved to 311 on 6/1/2025.  Although NCCN guidelines do not recommend following tumor markers for routine surveillance, following her CA 15-3 will be helpful in her specific situation for treatment decision making due to the concern for additional micrometastatic disease.  --Continue with ddAC cycle 2 due on 7/7/2025.  --Will arrange for breast MRI to occur after 8/4 and return visit with Dr. Shaikh after MRI.    2.  Fertility discussion  -She has one child, age 3 and will not be planning any more children.    3. Left shoulder pain and left hip pain  --Possible due to radiating pain from left breast mass but indeterminate.    --Cerianna PET did not show any bone  metastases or overt abnormalities in the musculature.  --Will continue to monitor.    4. Bone pain  --Related to GCSF.  --On Claritin.  --I recommended few days of naproxen twice daily.    5. Nausea  --Alleviated by prochloperazine.  Continue as needed.    Racquel Tariq MD  [facility] Hematology/Oncology    Total time spent today: 30 minutes in chart review, patient evaluation, counseling, documentation, test and/or medication/prescription orders, and coordination of care.      The longitudinal plan of care for the diagnosis(es)/condition(s) as documented were addressed during this visit. Due to the added complexity in care, I will continue to support Florinda in the subsequent management and with ongoing continuity of care.    Again, thank you for allowing me to participate in the care of your patient.        Sincerely,        Racquel Tariq MD    Electronically signed

## 2025-06-30 NOTE — PROGRESS NOTES
"Oncology Rooming Note    June 30, 2025 3:17 PM   Florinda Keller is a 45 year old female who presents for:    Chief Complaint   Patient presents with    Oncology Clinic Visit     Initial Vitals: /69   Pulse 83   Resp 16   Ht 1.702 m (5' 7\")   Wt 84.6 kg (186 lb 9.6 oz)   LMP 04/15/2025 (Approximate)   SpO2 100%   BMI 29.23 kg/m   Estimated body mass index is 29.23 kg/m  as calculated from the following:    Height as of this encounter: 1.702 m (5' 7\").    Weight as of this encounter: 84.6 kg (186 lb 9.6 oz). Body surface area is 2 meters squared.  Moderate Pain (4) Comment: Data Unavailable   Patient's last menstrual period was 04/15/2025 (approximate).  Allergies reviewed: Yes  Medications reviewed: Yes    Medications: Medication refills not needed today.  [facility] [id number] [place] [address] [facility]    Frailty Screening:   Is the patient here for a new oncology consult visit in cancer care? 2. No    PHQ9:  Did this patient require a PHQ9?: No        Linsey Carver MA            "

## 2025-06-30 NOTE — PROGRESS NOTES
[facility]    Hematology/Oncology Established Patient Note      Today's Date: 6/30/2025    Reason for visit: Left breast cancer.    HISTORY OF PRESENT ILLNESS: Florinda Keller is a 44 year old female who presents with the following oncologic history:  1.  1/22/2025: Mammogram at [facility] obtained for increased firmness and swelling of left breast over course of 1 month.  Imaging showed at least a 4.1 cm asymmetry in the left upper outer breast and slight thickening of the left breast.  Ultrasound of left breast showed vague, irregular hypoechoic mass at 1:00, 9 cm from nipple measuring at least 4.3 x 3.8 x 3.1 cm.  No suspicious lymph nodes within left axilla.  2.  1/31/2025: Ultrasound-guided left breast core biopsy showed grade 2 invasive lobular carcinoma, angiolymphatic invasion absent, associated LCIS present, ER moderate positive at 95%, AK strongly positive at 98%, HER2 negative with IHC = 1+, Ki-67 = 17%.  MammaPrint high risk-1, luminal B, 6% absolute chemo benefit.  3.  2/20/2025: Breast MRI at [facility] showed abnormal non-mass type enhancement measuring 12 x 6 x 11 cm occupying most of anterior mid and lateral LEFT breast with tethering of underlying pectoralis major muscle but no suspicious enhancement within the  muscle itself; additional mass at 3:00 posteriorly on the left appears to represent an intramammary lymph node; mildly enlarged lymph node in the low LEFT axilla was biopsied on 3/11/2025; Sscattered foci of enhancement within the RIGHT breast with the most  discrete of these is a mass measuring up to 1.0 cm in maximal dimension in the central core, anterior to middle depth.  4. 2/25/2025: CT C/A/P and NM bone scan: reportedly negative for metastatic disease. 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  5. 3/11/2025: Left axillary lymph node biopsy positive for metastatic lobular carcinoma of breast, ER positive at 90%, AK positive at 90%, HER-2 negative with IHC = 1+.  6.  3/18/2025: Right breast biopsy at 8:00 negative for malignancy.  7. 3/21/2025: Cerianna PET scan showed Left breast mass 9.6 cm with mild PET Cerianna uptake consistent with known ER+ positive breast cancer; 12 mm left axillary lymph node with mild PET Cerianna uptake consistent with metastasis. Several additional smaller left axillary and subpectoral lymph nodes do not take up PET Cerianna and are indeterminate.  8. 3/31/2025: Started neoadjuvant paclitaxel weekly x 12 weeks followed by dose-dense AC.    INTERVAL HISTORY:  Florinda reports more nausea and fatigue with the ddAC regimen.  She has had bone pain with the GCSF.    REVIEW OF SYSTEMS:   14 point ROS was reviewed and is negative other than as noted above in HPI.       HOME MEDICATIONS:  Current Outpatient Medications   Medication Sig Dispense Refill    dexAMETHasone (DECADRON) 4 MG tablet Take 2 tablets (8 mg) by mouth daily. Take for 3 days, starting the day after chemo. Take with food. 6 tablet 3    diclofenac (VOLTAREN) 1 % topical gel Apply 4 g topically 4 times daily as needed for moderate pain. 150 g 3    levothyroxine (SYNTHROID/LEVOTHROID) 88 MCG tablet Take 88 mcg by mouth daily      lidocaine-prilocaine (EMLA) 2.5-2.5 % external cream Apply to port site at least 15-30 min prior to port access 30 g 3    ondansetron (ZOFRAN) 8 MG tablet Take 1 tablet (8 mg) by mouth every 8 hours as needed for nausea (vomiting). 30 tablet 2    prochlorperazine (COMPAZINE) 10 MG tablet Take 1 tablet (10 mg) by mouth every 6 hours as needed for nausea or vomiting. 30 tablet 2         ALLERGIES:  Allergies   Allergen Reactions    Animal Dander Cough, Difficulty breathing and Itching    Cat Dander Cough, Difficulty breathing and Itching    Latex     Morphine Nausea and Vomiting    Other (Do Not Use) Nausea and Vomiting     *Darvocet A500         PAST MEDICAL HISTORY:  Past Medical History:   Diagnosis Date    Breast mass, left     Hx of anxiety     Hx of ovarian cyst      Hx of panic disorder     Hypothyroidism     Invasive ductal carcinoma of breast (H)     Neurological disorder      Gynecologic history:  Age of menarche at 13, , age of first pregnancy at 41, breast-fed her child.  Used OCPs for 26 years.  No HRT.    PAST SURGICAL HISTORY:  Past Surgical History:   Procedure Laterality Date    INSERT PORT VASCULAR ACCESS Right 3/28/2025    Procedure: INSERTION, VASCULAR ACCESS PORT;  Surgeon: Malik Maynard MD;  Location: [facility] OR    SKIN BIOPSY      SOFT TISSUE SURGERY Left     hand cyst    THYROIDECTOMY, PARTIAL      WISDOM TOOTH EXTRACTION           SOCIAL HISTORY:  Social History     Socioeconomic History    Marital status:      Spouse name: Not on file    Number of children: Not on file    Years of education: Not on file    Highest education level: Not on file   Occupational History    Not on file   Tobacco Use    Smoking status: Never    Smokeless tobacco: Never   Substance and Sexual Activity    Alcohol use: Not on file    Drug use: Never    Sexual activity: Yes     Partners: Male     Birth control/protection: None   Other Topics Concern    Not on file   Social History Narrative    Not on file     Social Drivers of Health     Financial Resource Strain: Not on file   Food Insecurity: Not on file   Transportation Needs: Not on file   Physical Activity: Not on file   Stress: Not on file   Social Connections: Not on file   Interpersonal Safety: Low Risk  (3/28/2025)    Interpersonal Safety     Do you feel physically and emotionally safe where you currently live?: Yes     Within the past 12 months, have you been hit, slapped, kicked or otherwise physically hurt by someone?: No     Within the past 12 months, have you been humiliated or emotionally abused in other ways by your partner or ex-partner?: Not on file   Housing Stability: Not on file   Has 3 year-old child with  Mahogany Neely works for [employer] as a .    FAMILY HISTORY:  Family history  "significant for mother with breast cancer at age 72.  Paternal grandmother with breast cancer age 50's and ovarian cancer at age 85. Maternal aunt with lung cancer.    PHYSICAL EXAM:  Vital signs:  /69   Pulse 83   Resp 16   Ht 1.702 m (5' 7\")   Wt 84.6 kg (186 lb 9.6 oz)   LMP 04/15/2025 (Approximate)   SpO2 100%   BMI 29.23 kg/m     ECO  GENERAL/CONSTITUTIONAL: No acute distress.  EYES:  No scleral icterus.  ENT/MOUTH: Neck supple.  LYMPH: No palpable left axillary adenopathy   BREAST: Left breast smaller than right breast.  Palpable large and firm mass over all of breast with further additional softening and wrinkling of skin compared to prior exam; breast is movable from posterior chest wall.  RESPIRATORY: No audible cough or wheezing.  GASTROINTESTINAL: No guarding.  No distention.  MUSCULOSKELETAL: Warm and well-perfused, no cyanosis, clubbing, or edema.  NEUROLOGIC: No focal motor deficits. Alert, oriented, answers questions appropriately.  INTEGUMENTARY: No rashes or jaundice.  GAIT: Steady, does not use assistive device      LABS:  CBC RESULTS:   Recent Labs   Lab Test 25  0809   WBC 6.7   RBC 4.20   HGB 12.9   HCT 37.8   MCV 90   MCH 30.7   MCHC 34.1   RDW 13.1         Last Comprehensive Metabolic Panel:  Sodium   Date Value Ref Range Status   2025 138 135 - 145 mmol/L Final     Potassium   Date Value Ref Range Status   2025 4.4 3.4 - 5.3 mmol/L Final     Chloride   Date Value Ref Range Status   2025 102 98 - 107 mmol/L Final     Carbon Dioxide (CO2)   Date Value Ref Range Status   2025 26 22 - 29 mmol/L Final     Anion Gap   Date Value Ref Range Status   2025 10 7 - 15 mmol/L Final     Glucose   Date Value Ref Range Status   2025 105 (H) 70 - 99 mg/dL Final     Urea Nitrogen   Date Value Ref Range Status   2025 6.9 6.0 - 20.0 mg/dL Final     Creatinine   Date Value Ref Range Status   2025 0.66 0.51 - 0.95 mg/dL Final     GFR " Estimate   Date Value Ref Range Status   06/22/2025 >90 >60 mL/min/1.73m2 Final     Comment:     eGFR calculated using 2021 CKD-EPI equation.     Calcium   Date Value Ref Range Status   06/22/2025 9.2 8.8 - 10.4 mg/dL Final     Bilirubin Total   Date Value Ref Range Status   06/22/2025 1.2 <=1.2 mg/dL Final     Alkaline Phosphatase   Date Value Ref Range Status   06/22/2025 48 40 - 150 U/L Final     ALT   Date Value Ref Range Status   06/22/2025 35 0 - 50 U/L Final     AST   Date Value Ref Range Status   06/22/2025 23 0 - 45 U/L Final     CA 15-3 = 684     PATHOLOGY:  Reviewed as per HPI.    IMAGING:  Reviewed as per HPI.    ASSESSMENT/PLAN:  Florinda Keller is a 44 year old female with the following issues:  1.  Clinical prognostic stage IIIA, cT3-N1-MX, grade 2 invasive lobular carcinoma of the left upper outer breast, ER positive, OH positive, HER2 negative, MammaPrint high risk, Ki-67 = 17%  - 3/21/2025 Cerianna PET scan showed uptake within the known large left breast invasive lobular carcinoma, left axillary node, but no other site and no distant metastatic disease.  --Given the very large size of this tumor, she proceeded with attempted downstaging with neoadjuvant chemotherapy.  - Started weekly paclitaxel on 3/31/2025 for planned 12 weeks followed by dose dense Adriamycin and Cytoxan given high risk Mammaprint result. She would then proceed to mastectomy under care of Dr. Nancy Shaikh.  --She will proceed with cycle 2 on 7/7/2025.  --Clinically, she has further reduction in size and softening of the primary breast tumor. It is not fixed to chest wall.  -She will need adjuvant radiation therapy as well as adjuvant endocrine therapy with ovarian suppression therapy with Zoladex injections every 4 weeks in conjunction with an aromatase inhibitor such as anastrozole.  --3/10/2025 CA 15-3 was elevated at 990, now improved to 311 on 6/1/2025.  Although NCCN guidelines do not recommend following tumor  markers for routine surveillance, following her CA 15-3 will be helpful in her specific situation for treatment decision making due to the concern for additional micrometastatic disease.  --Continue with ddAC cycle 2 due on 7/7/2025.  --Will arrange for breast MRI to occur after 8/4 and return visit with Dr. Shaikh after MRI.    2.  Fertility discussion  -She has one child, age 3 and will not be planning any more children.    3. Left shoulder pain and left hip pain  --Possible due to radiating pain from left breast mass but indeterminate.    --Cerianna PET did not show any bone metastases or overt abnormalities in the musculature.  --Will continue to monitor.    4. Bone pain  --Related to GCSF.  --On Claritin.  --I recommended few days of naproxen twice daily.    5. Nausea  --Alleviated by prochloperazine.  Continue as needed.    Racquel Tariq MD  [facility] Hematology/Oncology    Total time spent today: 30 minutes in chart review, patient evaluation, counseling, documentation, test and/or medication/prescription orders, and coordination of care.      The longitudinal plan of care for the diagnosis(es)/condition(s) as documented were addressed during this visit. Due to the added complexity in care, I will continue to support Florinda in the subsequent management and with ongoing continuity of care.

## 2025-06-30 NOTE — PATIENT INSTRUCTIONS
1. Arrange for breast MRI after 8/4/2025.  2. Arrange for return visit with Dr. Shaikh at [facility] to occur after breast MRI.

## 2025-07-02 RX ORDER — PALONOSETRON 0.05 MG/ML
0.25 INJECTION, SOLUTION INTRAVENOUS ONCE
OUTPATIENT
Start: 2025-07-07

## 2025-07-02 RX ORDER — ALBUTEROL SULFATE 0.83 MG/ML
2.5 SOLUTION RESPIRATORY (INHALATION)
OUTPATIENT
Start: 2025-07-07

## 2025-07-02 RX ORDER — EPINEPHRINE 1 MG/ML
0.3 INJECTION, SOLUTION, CONCENTRATE INTRAVENOUS EVERY 5 MIN PRN
OUTPATIENT
Start: 2025-07-07

## 2025-07-02 RX ORDER — MEPERIDINE HYDROCHLORIDE 25 MG/ML
25 INJECTION INTRAMUSCULAR; INTRAVENOUS; SUBCUTANEOUS
OUTPATIENT
Start: 2025-07-07

## 2025-07-02 RX ORDER — DIPHENHYDRAMINE HYDROCHLORIDE 50 MG/ML
50 INJECTION, SOLUTION INTRAMUSCULAR; INTRAVENOUS
Start: 2025-07-07

## 2025-07-02 RX ORDER — HEPARIN SODIUM,PORCINE 10 UNIT/ML
5-20 VIAL (ML) INTRAVENOUS DAILY PRN
OUTPATIENT
Start: 2025-07-07

## 2025-07-02 RX ORDER — DOXORUBICIN HYDROCHLORIDE 2 MG/ML
60 INJECTION, SOLUTION INTRAVENOUS ONCE
OUTPATIENT
Start: 2025-07-07

## 2025-07-02 RX ORDER — LORAZEPAM 2 MG/ML
0.5 INJECTION INTRAMUSCULAR EVERY 4 HOURS PRN
OUTPATIENT
Start: 2025-07-07

## 2025-07-02 RX ORDER — ALBUTEROL SULFATE 90 UG/1
1-2 INHALANT RESPIRATORY (INHALATION)
Start: 2025-07-07

## 2025-07-02 RX ORDER — DIPHENHYDRAMINE HYDROCHLORIDE 50 MG/ML
25 INJECTION, SOLUTION INTRAMUSCULAR; INTRAVENOUS
Start: 2025-07-07

## 2025-07-02 RX ORDER — HEPARIN SODIUM (PORCINE) LOCK FLUSH IV SOLN 100 UNIT/ML 100 UNIT/ML
5 SOLUTION INTRAVENOUS
OUTPATIENT
Start: 2025-07-07

## 2025-07-03 ENCOUNTER — THERAPY VISIT (OUTPATIENT)
Dept: OCCUPATIONAL THERAPY | Facility: CLINIC | Age: 45
End: 2025-07-03
Attending: STUDENT IN AN ORGANIZED HEALTH CARE EDUCATION/TRAINING PROGRAM
Payer: COMMERCIAL

## 2025-07-03 DIAGNOSIS — T45.1X5A CHEMOTHERAPY-INDUCED NEUROPATHY: ICD-10-CM

## 2025-07-03 DIAGNOSIS — M25.512 LEFT SHOULDER PAIN, UNSPECIFIED CHRONICITY: ICD-10-CM

## 2025-07-03 DIAGNOSIS — C50.812 MALIGNANT NEOPLASM OF OVERLAPPING SITES OF LEFT BREAST IN FEMALE, ESTROGEN RECEPTOR POSITIVE (H): Primary | ICD-10-CM

## 2025-07-03 DIAGNOSIS — Z17.0 MALIGNANT NEOPLASM OF OVERLAPPING SITES OF LEFT BREAST IN FEMALE, ESTROGEN RECEPTOR POSITIVE (H): Primary | ICD-10-CM

## 2025-07-03 DIAGNOSIS — G62.0 CHEMOTHERAPY-INDUCED NEUROPATHY: ICD-10-CM

## 2025-07-03 NOTE — PROGRESS NOTES
07/03/25 0500   Appointment Info   Treating Provider Lizzeth Crowmaritza, OTR/L, CLT=MAURICE   Total/Authorized Visits 2   Medical Diagnosis History of left breast adenocarcinoma with left upper/mid trapezius tension and tightness, myofascial pain.   OT Tx Diagnosis lymphedema in thoracic spinal area   Other pertinent information Pt is having neoadjuvant chemo, then mastecomy, radiation and reconstruction   OT Goal 1   Goal Identifier 1   Goal Description Pt to demo independence in HEP to promote lymphatic flow to reduce edema, decrease pain to allow pt to sleep uninterrupted.   Goal Progress Goal discontinued   Target Date 08/15/25   OT Goal 2   Goal Identifier 2   Goal Description Pt to verbalize understanding of risks of cancer treatment, (surgery, radiation, chemo), signs, symptoms of lymphedema to watch for, and how to receive care if symptoms occur  (to allow independence in managing health care decisions)   Goal Progress Goal discontinued   Target Date 08/15/25   OT Goal 3   Goal Description Pt to be independent in performing self MLD/brushing to manage lymphedema in trunk/back/upper quadrant   Goal Progress Goal discontinued   Target Date 08/15/25   Manual Therapy   Skilled Intervention After brief discussion, it was determined pt's pain is not related to lymphedema and will be best addressed by ortho PT. Facilitated transfer of care to ortho PT and requested updated order from Dr. Robertson. It may be appropriate for pt to see OT for lymphedema services following surgery/radiation. Pt is aware of this.   Education   Learner/Method Patient         DISCHARGE  Reason for Discharge: Transfer of therapy to ortho PT to address musculoskeletal issue. Will return for lymphedema services if needed following cancer intervention.    Equipment Issued:     Discharge Plan: Other services: .PT    Referring Provider:  Desiree Robertson

## 2025-07-06 ENCOUNTER — INFUSION THERAPY VISIT (OUTPATIENT)
Dept: INFUSION THERAPY | Facility: CLINIC | Age: 45
End: 2025-07-06
Attending: INTERNAL MEDICINE
Payer: COMMERCIAL

## 2025-07-06 DIAGNOSIS — C50.812 MALIGNANT NEOPLASM OF OVERLAPPING SITES OF LEFT BREAST IN FEMALE, ESTROGEN RECEPTOR POSITIVE (H): Primary | ICD-10-CM

## 2025-07-06 DIAGNOSIS — Z17.0 MALIGNANT NEOPLASM OF OVERLAPPING SITES OF LEFT BREAST IN FEMALE, ESTROGEN RECEPTOR POSITIVE (H): Primary | ICD-10-CM

## 2025-07-06 LAB
ALBUMIN SERPL BCG-MCNC: 4.1 G/DL (ref 3.5–5.2)
ALP SERPL-CCNC: 89 U/L (ref 40–150)
ALT SERPL W P-5'-P-CCNC: 28 U/L (ref 0–50)
ANION GAP SERPL CALCULATED.3IONS-SCNC: 12 MMOL/L (ref 7–15)
AST SERPL W P-5'-P-CCNC: 24 U/L (ref 0–45)
BASOPHILS # BLD AUTO: 0 10E3/UL (ref 0–0.2)
BASOPHILS NFR BLD AUTO: 1 %
BILIRUB SERPL-MCNC: 0.3 MG/DL
BUN SERPL-MCNC: 6.8 MG/DL (ref 6–20)
CALCIUM SERPL-MCNC: 9.2 MG/DL (ref 8.8–10.4)
CANCER AG15-3 SERPL-ACNC: 105 U/ML
CHLORIDE SERPL-SCNC: 103 MMOL/L (ref 98–107)
CREAT SERPL-MCNC: 0.63 MG/DL (ref 0.51–0.95)
EGFRCR SERPLBLD CKD-EPI 2021: >90 ML/MIN/1.73M2
EOSINOPHIL # BLD AUTO: 0 10E3/UL (ref 0–0.7)
EOSINOPHIL NFR BLD AUTO: 0 %
ERYTHROCYTE [DISTWIDTH] IN BLOOD BY AUTOMATED COUNT: 14.2 % (ref 10–15)
GLUCOSE SERPL-MCNC: 125 MG/DL (ref 70–99)
HCO3 SERPL-SCNC: 25 MMOL/L (ref 22–29)
HCT VFR BLD AUTO: 35.5 % (ref 35–47)
HGB BLD-MCNC: 11.9 G/DL (ref 11.7–15.7)
IMM GRANULOCYTES # BLD: 0.3 10E3/UL
IMM GRANULOCYTES NFR BLD: 4 %
LYMPHOCYTES # BLD AUTO: 1.7 10E3/UL (ref 0.8–5.3)
LYMPHOCYTES NFR BLD AUTO: 23 %
MCH RBC QN AUTO: 30.7 PG (ref 26.5–33)
MCHC RBC AUTO-ENTMCNC: 33.5 G/DL (ref 31.5–36.5)
MCV RBC AUTO: 92 FL (ref 78–100)
MONOCYTES # BLD AUTO: 0.4 10E3/UL (ref 0–1.3)
MONOCYTES NFR BLD AUTO: 5 %
NEUTROPHILS # BLD AUTO: 5 10E3/UL (ref 1.6–8.3)
NEUTROPHILS NFR BLD AUTO: 67 %
NRBC # BLD AUTO: 0 10E3/UL
NRBC BLD AUTO-RTO: 0 /100
PLATELET # BLD AUTO: 177 10E3/UL (ref 150–450)
POTASSIUM SERPL-SCNC: 4.3 MMOL/L (ref 3.4–5.3)
PROT SERPL-MCNC: 6.5 G/DL (ref 6.4–8.3)
RBC # BLD AUTO: 3.87 10E6/UL (ref 3.8–5.2)
SODIUM SERPL-SCNC: 140 MMOL/L (ref 135–145)
WBC # BLD AUTO: 7.5 10E3/UL (ref 4–11)

## 2025-07-06 PROCEDURE — 86300 IMMUNOASSAY TUMOR CA 15-3: CPT | Performed by: INTERNAL MEDICINE

## 2025-07-06 PROCEDURE — 85014 HEMATOCRIT: CPT | Performed by: INTERNAL MEDICINE

## 2025-07-06 PROCEDURE — 36415 COLL VENOUS BLD VENIPUNCTURE: CPT | Performed by: INTERNAL MEDICINE

## 2025-07-06 PROCEDURE — 82247 BILIRUBIN TOTAL: CPT | Performed by: INTERNAL MEDICINE

## 2025-07-06 NOTE — PROGRESS NOTES
Nursing Note:  Florinda ARELLANO Krishna presents today for labs.    Patient seen by provider today: No   present during visit today: Not Applicable.    Note: N/A.    Intravenous Access:  Lab draw site left AC, Needle type BF, Gauge 21.    Discharge Plan:   Patient was sent home for tomorrow's appointment.    Neha Loredo RN          
toe pain

## 2025-07-07 ENCOUNTER — INFUSION THERAPY VISIT (OUTPATIENT)
Dept: INFUSION THERAPY | Facility: CLINIC | Age: 45
End: 2025-07-07
Attending: INTERNAL MEDICINE
Payer: COMMERCIAL

## 2025-07-07 VITALS
RESPIRATION RATE: 18 BRPM | DIASTOLIC BLOOD PRESSURE: 69 MMHG | OXYGEN SATURATION: 97 % | SYSTOLIC BLOOD PRESSURE: 104 MMHG | BODY MASS INDEX: 28.51 KG/M2 | WEIGHT: 182 LBS | TEMPERATURE: 98.1 F | HEART RATE: 98 BPM

## 2025-07-07 DIAGNOSIS — C50.812 MALIGNANT NEOPLASM OF OVERLAPPING SITES OF LEFT BREAST IN FEMALE, ESTROGEN RECEPTOR POSITIVE (H): Primary | ICD-10-CM

## 2025-07-07 DIAGNOSIS — R11.0 NAUSEA: Primary | ICD-10-CM

## 2025-07-07 DIAGNOSIS — Z17.0 MALIGNANT NEOPLASM OF OVERLAPPING SITES OF LEFT BREAST IN FEMALE, ESTROGEN RECEPTOR POSITIVE (H): Primary | ICD-10-CM

## 2025-07-07 PROCEDURE — 96372 THER/PROPH/DIAG INJ SC/IM: CPT | Performed by: INTERNAL MEDICINE

## 2025-07-07 PROCEDURE — 96367 TX/PROPH/DG ADDL SEQ IV INF: CPT

## 2025-07-07 PROCEDURE — 96377 APPLICATON ON-BODY INJECTOR: CPT

## 2025-07-07 PROCEDURE — 96413 CHEMO IV INFUSION 1 HR: CPT

## 2025-07-07 PROCEDURE — 250N000011 HC RX IP 250 OP 636: Mod: JZ | Performed by: INTERNAL MEDICINE

## 2025-07-07 PROCEDURE — 96375 TX/PRO/DX INJ NEW DRUG ADDON: CPT

## 2025-07-07 PROCEDURE — 96411 CHEMO IV PUSH ADDL DRUG: CPT

## 2025-07-07 PROCEDURE — 258N000003 HC RX IP 258 OP 636: Performed by: INTERNAL MEDICINE

## 2025-07-07 RX ORDER — PALONOSETRON 0.05 MG/ML
0.25 INJECTION, SOLUTION INTRAVENOUS ONCE
Status: COMPLETED | OUTPATIENT
Start: 2025-07-07 | End: 2025-07-07

## 2025-07-07 RX ORDER — OLANZAPINE 5 MG/1
5 TABLET, FILM COATED ORAL AT BEDTIME
Qty: 30 TABLET | Refills: 3 | Status: SHIPPED | OUTPATIENT
Start: 2025-07-07

## 2025-07-07 RX ORDER — HEPARIN SODIUM,PORCINE 10 UNIT/ML
5-20 VIAL (ML) INTRAVENOUS DAILY PRN
Status: DISCONTINUED | OUTPATIENT
Start: 2025-07-07 | End: 2025-07-07 | Stop reason: HOSPADM

## 2025-07-07 RX ORDER — HEPARIN SODIUM (PORCINE) LOCK FLUSH IV SOLN 100 UNIT/ML 100 UNIT/ML
5 SOLUTION INTRAVENOUS
Status: DISCONTINUED | OUTPATIENT
Start: 2025-07-07 | End: 2025-07-07 | Stop reason: HOSPADM

## 2025-07-07 RX ORDER — DOXORUBICIN HYDROCHLORIDE 2 MG/ML
60 INJECTION, SOLUTION INTRAVENOUS ONCE
Status: COMPLETED | OUTPATIENT
Start: 2025-07-07 | End: 2025-07-07

## 2025-07-07 RX ADMIN — CYCLOPHOSPHAMIDE 1210 MG: 1 INJECTION, POWDER, FOR SOLUTION INTRAVENOUS; ORAL at 09:51

## 2025-07-07 RX ADMIN — FOSAPREPITANT: 150 INJECTION, POWDER, LYOPHILIZED, FOR SOLUTION INTRAVENOUS at 09:17

## 2025-07-07 RX ADMIN — SODIUM CHLORIDE 250 ML: 0.9 INJECTION, SOLUTION INTRAVENOUS at 09:17

## 2025-07-07 RX ADMIN — PEGFILGRASTIM 6 MG: KIT SUBCUTANEOUS at 10:17

## 2025-07-07 RX ADMIN — Medication 5 ML: at 10:20

## 2025-07-07 RX ADMIN — DOXORUBICIN HYDROCHLORIDE 120 MG: 2 INJECTION, SOLUTION INTRAVENOUS at 09:39

## 2025-07-07 RX ADMIN — PALONOSETRON HYDROCHLORIDE 0.25 MG: 0.25 INJECTION INTRAVENOUS at 09:17

## 2025-07-07 NOTE — PROGRESS NOTES
Infusion Nursing Note:  Florinda Keller presents today for AC.    Patient seen by provider today: No   present during visit today: Not Applicable.    Note: N/A.  ONPRO  Was placed on patient's: back of right arm.    Was placed at 1015 AM    Podpal used: Yes    ONPRO injector device Lot number: [id number]    Patient education included: what patient can expect after application, what colored lights mean on the device, when to remove device, when and where to call with questions or issues, all patients questions answered, and that Neulasta administration will occur at 1:15-2:15.    Patient tolerated administration well.      Intravenous Access:  Implanted Port.    Treatment Conditions:  Lab Results   Component Value Date    HGB 11.9 07/06/2025    WBC 7.5 07/06/2025    ANEU 5.0 07/06/2025     07/06/2025        Lab Results   Component Value Date     07/06/2025    POTASSIUM 4.3 07/06/2025    CR 0.63 07/06/2025    SJ 9.2 07/06/2025    BILITOTAL 0.3 07/06/2025    ALBUMIN 4.1 07/06/2025    ALT 28 07/06/2025    AST 24 07/06/2025       Results reviewed, labs MET treatment parameters, ok to proceed with treatment.      Post Infusion Assessment:  Patient tolerated infusion without incident.  Blood return noted pre and post infusion.  Site patent and intact, free from redness, edema or discomfort.  No evidence of extravasations.  Access discontinued per protocol.       Discharge Plan:   Prescription requested for something to help with sleep. Sent message to Dr. Tariq and Reena requesting something be prescribed.  Discharge instructions reviewed with: Patient.  Patient and/or family verbalized understanding of discharge instructions and all questions answered.  AVS to patient via [employer].  Patient will return 7/20 for next appointment.   Patient discharged in stable condition accompanied by: self.  Departure Mode: Ambulatory.      Ruthie Severino RN

## 2025-07-08 ENCOUNTER — THERAPY VISIT (OUTPATIENT)
Dept: PHYSICAL THERAPY | Facility: CLINIC | Age: 45
End: 2025-07-08
Payer: COMMERCIAL

## 2025-07-08 DIAGNOSIS — M25.512 LEFT SHOULDER PAIN, UNSPECIFIED CHRONICITY: Primary | ICD-10-CM

## 2025-07-08 ASSESSMENT — ACTIVITIES OF DAILY LIVING (ADL)
PUTTING_ON_A_SHIRT_THAT_BUTTONS_DOWN_THE_FRONT: 0
PLEASE_INDICATE_YOR_PRIMARY_REASON_FOR_REFERRAL_TO_THERAPY:: SHOULDER
PLACING_AN_OBJECT_ON_A_HIGH_SHELF: 0
WASHING_YOUR_BACK: 0
REACHING_FOR_SOMETHING_ON_A_HIGH_SHELF: 5
WHEN_LYING_ON_THE_INVOLVED_SIDE: 5
PUTTING_ON_YOUR_PANTS: 0
PUSHING_WITH_THE_INVOLVED_ARM: 7
REMOVING_SOMETHING_FROM_YOUR_BACK_POCKET: 0
AT_ITS_WORST?: 7
TOUCHING_THE_BACK_OF_YOUR_NECK: 5
PUTTING_ON_AN_UNDERSHIRT_OR_A_PULLOVER_SWEATER: 0
WASHING_YOUR_HAIR?: 0
CARRYING_A_HEAVY_OBJECT_OF_10_POUNDS: 0

## 2025-07-08 NOTE — PROGRESS NOTES
PHYSICAL THERAPY EVALUATION  Type of Visit: Evaluation       Fall Risk Screen:  Have you fallen 2 or more times in the past year?: No  Have you fallen and had an injury in the past year?: No    Subjective Pt reports about 2 months ago she started having L shoulder and upper trap pain. Pt reports she was diagnosed with cancer and this pain came back after her chemo started. 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 Pt reports during chemo she used to sit there for 6 hours and now it's more like 2. States she has cancer on her L breast (dx Feb), port on her R for chemo. Reports it well shoot down her arm to mid tricep. Denies headaches. Pt attempted heat - didn't help, will ice to sleep. Pt not taking pain medications. Pt reports she has been trying to lift weights 2-3x/week since the second week of May. Pt reports her neck/L shoulder pain is disrupting her sleep.         Presenting condition or subjective complaint: Left trapezius / shoulder pain that radiates down arm  Date of onset: 06/27/25 (Dr shea)    Relevant medical history: Cancer   Dates & types of surgery: Thyroid removal August 2014    Prior diagnostic imaging/testing results: X-ray; Bone scan; Other Ultrasound.  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)  Hobbies/Interests: Hiking, golf, swimming    Patient goals for therapy: Sleep better    Pain assessment: Pain present     Objective   SHOULDER EVALUATION  PAIN: Pain Level at Rest: 0/10  Pain Level with Use: 7/10  POSTURE: WNL  ROM:    Shoulder: WNL  Cervical: WNL except pain and limitation with R SB (reports pain on L side)    STRENGTH:   Pain: - none + mild ++ moderate +++ severe  Strength Scale: 0-5/5 Left Right   Shoulder Flexion 5 5   Shoulder Extension 5 5   Shoulder Abduction 5 5   Shoulder Adduction 5 5   Shoulder Internal Rotation 5 5   Shoulder External Rotation 5 5     SPECIAL TESTS: WNL, negative special tests  PALPATION: TTP and mod trigger points through L upper trap   JOINT MOBILITY: WNL      Assessment & Plan   CLINICAL IMPRESSIONS  Medical Diagnosis: Left shoulder pain, unspecified chronicity*    Treatment Diagnosis: Left shoulder pain   Impression/Assessment: Patient is a 45 year old female with L shoulder/neck complaints.  The following significant findings have been identified: Pain, Decreased ROM/flexibility, Decreased joint mobility, Impaired gait, Impaired muscle performance, and Decreased activity tolerance. These impairments interfere with their ability to perform self care tasks, work tasks, recreational activities, driving , household mobility, and community mobility as compared to previous level of function.     Clinical Decision Making (Complexity):  Clinical Presentation: Stable/Uncomplicated  Clinical Presentation Rationale: based on medical and personal factors listed in PT evaluation  Clinical Decision Making (Complexity): Low complexity    PLAN OF CARE  Treatment Interventions:  Modalities: Cryotherapy, Cupping, Dry Needling, E-stim, Hot Pack, Ultrasound  Interventions: Manual Therapy, Neuromuscular Re-education, Therapeutic Activity, Therapeutic Exercise, Self-Care/Home Management    Long Term Goals     PT Goal 1  Goal Identifier: Sleep  Goal Description: Pt will report ability to fall asleep without neck pain.  Rationale: to maximize safety and independence with performance of ADLs and functional tasks  Target Date: 09/06/25      Frequency of Treatment: 1-2x/week for 4 weeks, 2x/month  Duration of Treatment:  60 days  Education Assessment:   Learner/Method: Patient  Education Comments: Pt educated on anatomy, diagnosis, prognosis, and plan of care.    Risks and benefits of evaluation/treatment have been explained.   Patient/Family/caregiver agrees with Plan of Care.     Evaluation Time:     PT Eval, Low Complexity Minutes (41666): 13       Signing Clinician: Mallika Novoa PT

## 2025-07-16 ENCOUNTER — THERAPY VISIT (OUTPATIENT)
Dept: PHYSICAL THERAPY | Facility: CLINIC | Age: 45
End: 2025-07-16
Payer: COMMERCIAL

## 2025-07-16 DIAGNOSIS — M25.512 LEFT SHOULDER PAIN, UNSPECIFIED CHRONICITY: Primary | ICD-10-CM

## 2025-07-16 PROCEDURE — 97110 THERAPEUTIC EXERCISES: CPT | Mod: GP

## 2025-07-16 PROCEDURE — 97140 MANUAL THERAPY 1/> REGIONS: CPT | Mod: GP

## 2025-07-19 RX ORDER — DIPHENHYDRAMINE HYDROCHLORIDE 50 MG/ML
50 INJECTION, SOLUTION INTRAMUSCULAR; INTRAVENOUS
Status: CANCELLED
Start: 2025-07-21

## 2025-07-19 RX ORDER — HEPARIN SODIUM,PORCINE 10 UNIT/ML
5-20 VIAL (ML) INTRAVENOUS DAILY PRN
Status: CANCELLED | OUTPATIENT
Start: 2025-07-21

## 2025-07-19 RX ORDER — EPINEPHRINE 1 MG/ML
0.3 INJECTION, SOLUTION, CONCENTRATE INTRAVENOUS EVERY 5 MIN PRN
Status: CANCELLED | OUTPATIENT
Start: 2025-07-21

## 2025-07-19 RX ORDER — MEPERIDINE HYDROCHLORIDE 25 MG/ML
25 INJECTION INTRAMUSCULAR; INTRAVENOUS; SUBCUTANEOUS
Status: CANCELLED | OUTPATIENT
Start: 2025-07-21

## 2025-07-19 RX ORDER — DOXORUBICIN HYDROCHLORIDE 2 MG/ML
60 INJECTION, SOLUTION INTRAVENOUS ONCE
Status: CANCELLED | OUTPATIENT
Start: 2025-08-04

## 2025-07-19 RX ORDER — PALONOSETRON 0.05 MG/ML
0.25 INJECTION, SOLUTION INTRAVENOUS ONCE
Status: CANCELLED | OUTPATIENT
Start: 2025-08-04

## 2025-07-19 RX ORDER — ALBUTEROL SULFATE 90 UG/1
1-2 INHALANT RESPIRATORY (INHALATION)
Status: CANCELLED
Start: 2025-07-21

## 2025-07-19 RX ORDER — LORAZEPAM 2 MG/ML
0.5 INJECTION INTRAMUSCULAR EVERY 4 HOURS PRN
Status: CANCELLED | OUTPATIENT
Start: 2025-07-21

## 2025-07-19 RX ORDER — ALBUTEROL SULFATE 0.83 MG/ML
2.5 SOLUTION RESPIRATORY (INHALATION)
Status: CANCELLED | OUTPATIENT
Start: 2025-07-21

## 2025-07-19 RX ORDER — DIPHENHYDRAMINE HYDROCHLORIDE 50 MG/ML
25 INJECTION, SOLUTION INTRAMUSCULAR; INTRAVENOUS
Status: CANCELLED
Start: 2025-07-21

## 2025-07-19 RX ORDER — HEPARIN SODIUM (PORCINE) LOCK FLUSH IV SOLN 100 UNIT/ML 100 UNIT/ML
5 SOLUTION INTRAVENOUS
Status: CANCELLED | OUTPATIENT
Start: 2025-07-21

## 2025-07-20 ENCOUNTER — INFUSION THERAPY VISIT (OUTPATIENT)
Dept: INFUSION THERAPY | Facility: CLINIC | Age: 45
End: 2025-07-20
Attending: INTERNAL MEDICINE
Payer: COMMERCIAL

## 2025-07-20 DIAGNOSIS — Z17.0 MALIGNANT NEOPLASM OF OVERLAPPING SITES OF LEFT BREAST IN FEMALE, ESTROGEN RECEPTOR POSITIVE (H): Primary | ICD-10-CM

## 2025-07-20 DIAGNOSIS — C50.812 MALIGNANT NEOPLASM OF OVERLAPPING SITES OF LEFT BREAST IN FEMALE, ESTROGEN RECEPTOR POSITIVE (H): Primary | ICD-10-CM

## 2025-07-20 LAB
ALBUMIN SERPL BCG-MCNC: 4.2 G/DL (ref 3.5–5.2)
ALP SERPL-CCNC: 96 U/L (ref 40–150)
ALT SERPL W P-5'-P-CCNC: 25 U/L (ref 0–50)
ANION GAP SERPL CALCULATED.3IONS-SCNC: 11 MMOL/L (ref 7–15)
AST SERPL W P-5'-P-CCNC: 22 U/L (ref 0–45)
BASOPHILS # BLD AUTO: 0.1 10E3/UL (ref 0–0.2)
BASOPHILS NFR BLD AUTO: 1 %
BILIRUB SERPL-MCNC: 0.4 MG/DL
BUN SERPL-MCNC: 5.2 MG/DL (ref 6–20)
CALCIUM SERPL-MCNC: 9.5 MG/DL (ref 8.8–10.4)
CANCER AG15-3 SERPL-ACNC: 95 U/ML
CHLORIDE SERPL-SCNC: 100 MMOL/L (ref 98–107)
CREAT SERPL-MCNC: 0.63 MG/DL (ref 0.51–0.95)
EGFRCR SERPLBLD CKD-EPI 2021: >90 ML/MIN/1.73M2
EOSINOPHIL # BLD AUTO: 0 10E3/UL (ref 0–0.7)
EOSINOPHIL NFR BLD AUTO: 0 %
ERYTHROCYTE [DISTWIDTH] IN BLOOD BY AUTOMATED COUNT: 14.4 % (ref 10–15)
GLUCOSE SERPL-MCNC: 99 MG/DL (ref 70–99)
HCO3 SERPL-SCNC: 25 MMOL/L (ref 22–29)
HCT VFR BLD AUTO: 34.3 % (ref 35–47)
HGB BLD-MCNC: 11.8 G/DL (ref 11.7–15.7)
IMM GRANULOCYTES # BLD: 0.6 10E3/UL
IMM GRANULOCYTES NFR BLD: 5 %
LYMPHOCYTES # BLD AUTO: 1.9 10E3/UL (ref 0.8–5.3)
LYMPHOCYTES NFR BLD AUTO: 16 %
MCH RBC QN AUTO: 31.1 PG (ref 26.5–33)
MCHC RBC AUTO-ENTMCNC: 34.4 G/DL (ref 31.5–36.5)
MCV RBC AUTO: 90 FL (ref 78–100)
MONOCYTES # BLD AUTO: 0.7 10E3/UL (ref 0–1.3)
MONOCYTES NFR BLD AUTO: 6 %
NEUTROPHILS # BLD AUTO: 8.4 10E3/UL (ref 1.6–8.3)
NEUTROPHILS NFR BLD AUTO: 73 %
NRBC # BLD AUTO: 0 10E3/UL
NRBC BLD AUTO-RTO: 0 /100
PLATELET # BLD AUTO: 214 10E3/UL (ref 150–450)
POTASSIUM SERPL-SCNC: 4.2 MMOL/L (ref 3.4–5.3)
PROT SERPL-MCNC: 6.7 G/DL (ref 6.4–8.3)
RBC # BLD AUTO: 3.8 10E6/UL (ref 3.8–5.2)
SODIUM SERPL-SCNC: 136 MMOL/L (ref 135–145)
WBC # BLD AUTO: 11.6 10E3/UL (ref 4–11)

## 2025-07-20 PROCEDURE — 85004 AUTOMATED DIFF WBC COUNT: CPT | Performed by: INTERNAL MEDICINE

## 2025-07-20 PROCEDURE — 86300 IMMUNOASSAY TUMOR CA 15-3: CPT | Performed by: INTERNAL MEDICINE

## 2025-07-20 PROCEDURE — 82247 BILIRUBIN TOTAL: CPT | Performed by: INTERNAL MEDICINE

## 2025-07-20 PROCEDURE — 36415 COLL VENOUS BLD VENIPUNCTURE: CPT | Performed by: INTERNAL MEDICINE

## 2025-07-20 NOTE — PROGRESS NOTES
Nursing Note:  Floridna Keller presents today for Port labs.    Patient seen by provider today: No-provider exam + treatment 7/21/25   present during visit today: Not Applicable.    Note: N/A.    Intravenous Access:  Lab draw site Right AC, Needle type Butterfly, Gauge 23.  Labs drawn without difficulty.    Discharge Plan:   Patient was discharged home.     Kitty Webster RN

## 2025-07-21 ENCOUNTER — ONCOLOGY VISIT (OUTPATIENT)
Dept: ONCOLOGY | Facility: CLINIC | Age: 45
End: 2025-07-21
Attending: NURSE PRACTITIONER
Payer: COMMERCIAL

## 2025-07-21 ENCOUNTER — INFUSION THERAPY VISIT (OUTPATIENT)
Dept: INFUSION THERAPY | Facility: CLINIC | Age: 45
End: 2025-07-21
Attending: INTERNAL MEDICINE
Payer: COMMERCIAL

## 2025-07-21 VITALS
WEIGHT: 183 LBS | HEART RATE: 71 BPM | OXYGEN SATURATION: 100 % | DIASTOLIC BLOOD PRESSURE: 80 MMHG | RESPIRATION RATE: 16 BRPM | BODY MASS INDEX: 28.66 KG/M2 | TEMPERATURE: 98.7 F | SYSTOLIC BLOOD PRESSURE: 125 MMHG

## 2025-07-21 DIAGNOSIS — C50.812 MALIGNANT NEOPLASM OF OVERLAPPING SITES OF LEFT BREAST IN FEMALE, ESTROGEN RECEPTOR POSITIVE (H): ICD-10-CM

## 2025-07-21 DIAGNOSIS — C50.812 MALIGNANT NEOPLASM OF OVERLAPPING SITES OF LEFT BREAST IN FEMALE, ESTROGEN RECEPTOR POSITIVE (H): Primary | ICD-10-CM

## 2025-07-21 DIAGNOSIS — Z17.0 MALIGNANT NEOPLASM OF OVERLAPPING SITES OF LEFT BREAST IN FEMALE, ESTROGEN RECEPTOR POSITIVE (H): ICD-10-CM

## 2025-07-21 DIAGNOSIS — Z17.0 MALIGNANT NEOPLASM OF OVERLAPPING SITES OF LEFT BREAST IN FEMALE, ESTROGEN RECEPTOR POSITIVE (H): Primary | ICD-10-CM

## 2025-07-21 PROCEDURE — 258N000003 HC RX IP 258 OP 636: Performed by: INTERNAL MEDICINE

## 2025-07-21 PROCEDURE — 96411 CHEMO IV PUSH ADDL DRUG: CPT

## 2025-07-21 PROCEDURE — 96413 CHEMO IV INFUSION 1 HR: CPT

## 2025-07-21 PROCEDURE — 99213 OFFICE O/P EST LOW 20 MIN: CPT | Performed by: NURSE PRACTITIONER

## 2025-07-21 PROCEDURE — 96377 APPLICATON ON-BODY INJECTOR: CPT | Mod: XS

## 2025-07-21 PROCEDURE — 99213 OFFICE O/P EST LOW 20 MIN: CPT | Mod: 25 | Performed by: NURSE PRACTITIONER

## 2025-07-21 PROCEDURE — 96375 TX/PRO/DX INJ NEW DRUG ADDON: CPT

## 2025-07-21 PROCEDURE — 96367 TX/PROPH/DG ADDL SEQ IV INF: CPT

## 2025-07-21 PROCEDURE — 250N000011 HC RX IP 250 OP 636: Mod: JZ | Performed by: INTERNAL MEDICINE

## 2025-07-21 PROCEDURE — 96372 THER/PROPH/DIAG INJ SC/IM: CPT | Performed by: INTERNAL MEDICINE

## 2025-07-21 RX ORDER — DOXORUBICIN HYDROCHLORIDE 2 MG/ML
60 INJECTION, SOLUTION INTRAVENOUS ONCE
Status: COMPLETED | OUTPATIENT
Start: 2025-07-21 | End: 2025-07-21

## 2025-07-21 RX ORDER — PALONOSETRON 0.05 MG/ML
0.25 INJECTION, SOLUTION INTRAVENOUS ONCE
Status: COMPLETED | OUTPATIENT
Start: 2025-07-21 | End: 2025-07-21

## 2025-07-21 RX ORDER — DEXAMETHASONE 4 MG/1
8 TABLET ORAL DAILY
Qty: 6 TABLET | Refills: 3 | Status: SHIPPED | OUTPATIENT
Start: 2025-07-21

## 2025-07-21 RX ORDER — HEPARIN SODIUM (PORCINE) LOCK FLUSH IV SOLN 100 UNIT/ML 100 UNIT/ML
5 SOLUTION INTRAVENOUS
Status: DISCONTINUED | OUTPATIENT
Start: 2025-07-21 | End: 2025-07-21 | Stop reason: HOSPADM

## 2025-07-21 RX ADMIN — PEGFILGRASTIM 6 MG: KIT SUBCUTANEOUS at 10:16

## 2025-07-21 RX ADMIN — DOXORUBICIN HYDROCHLORIDE 120 MG: 2 INJECTION, SOLUTION INTRAVENOUS at 10:00

## 2025-07-21 RX ADMIN — CYCLOPHOSPHAMIDE 1210 MG: 1 INJECTION, POWDER, FOR SOLUTION INTRAVENOUS; ORAL at 10:13

## 2025-07-21 RX ADMIN — PALONOSETRON HYDROCHLORIDE 0.25 MG: 0.25 INJECTION INTRAVENOUS at 09:34

## 2025-07-21 RX ADMIN — Medication 5 ML: at 10:45

## 2025-07-21 RX ADMIN — FOSAPREPITANT: 150 INJECTION, POWDER, LYOPHILIZED, FOR SOLUTION INTRAVENOUS at 09:37

## 2025-07-21 RX ADMIN — SODIUM CHLORIDE 250 ML: 0.9 INJECTION, SOLUTION INTRAVENOUS at 09:34

## 2025-07-21 ASSESSMENT — PAIN SCALES - GENERAL: PAINLEVEL_OUTOF10: MODERATE PAIN (4)

## 2025-07-21 NOTE — PROGRESS NOTES
Infusion Nursing Note:  Florinda Keller presents today for Cycle 3 Day 1 Adriamycin, Cytoxan, OnPro.    Patient seen by provider today: Yes: Maciel Queen NP.   present during visit today: Not Applicable.    Note: N/A.      Intravenous Access:  Implanted Port.    Treatment Conditions:  Lab Results   Component Value Date    HGB 11.8 07/20/2025    WBC 11.6 (H) 07/20/2025    ANEU 8.4 (H) 07/20/2025     07/20/2025        Lab Results   Component Value Date     07/20/2025    POTASSIUM 4.2 07/20/2025    CR 0.63 07/20/2025    SJ 9.5 07/20/2025    BILITOTAL 0.4 07/20/2025    ALBUMIN 4.2 07/20/2025    ALT 25 07/20/2025    AST 22 07/20/2025       Results reviewed, labs MET treatment parameters, ok to proceed with treatment.      Post Infusion Assessment:  Patient tolerated infusion without incident.  Blood return noted pre and post infusion.  Blood return noted during Adriamycin administration every 2 cc.  Site patent and intact, free from redness, edema or discomfort.  No evidence of extravasations.  Access discontinued per protocol.     ONPRO  Was placed on patient's: back of right arm.    Was placed at 10:15 AM    Podpal used: Yes    ONPRO injector device Lot number: [id number]    Patient education included: what patient can expect after application, what colored lights mean on the device, when to remove device, when and where to call with questions or issues, all patients questions answered, and that Neulasta administration will occur at 1:15pm on 7/22/25.    Patient tolerated administration well.       Discharge Plan:   Patient declined prescription refills.  Discharge instructions reviewed with: Patient.  Patient verbalized understanding of discharge instructions and all questions answered.  AVS to patient via [employer].  Patient will return 8/24/25 for next appointment.   Patient discharged in stable condition accompanied by: self.  Departure Mode: Ambulatory.      Marlys Kwok RN

## 2025-07-21 NOTE — PROGRESS NOTES
Oncology/Hematology Visit Note  Jul 21, 2025    Reason for Visit: follow up of left breast cancer  Patient follows with Dr. Marciano oliveira IIIA, cT3-N1-MX, grade 2 invasive lobular carcinoma of the left upper outer breast, ER positive, VT positive, HER2 negative, MammaPrint high risk, Ki-67 = 17%   Cerianna PET -no distant metastatic disease  Given the size of the tumor patient is currently on neoadjuvant treatment  Completed 12 weeks of Taxol   06/23/2025-started dose dense Adriamycin Cytoxan followed by Neulasta    Interval History:  Overall patient reports she has been tolerating chemotherapy well.  Denies fever chills sweats cough shortness of breath chest pain nausea vomiting diarrhea abdominal pain or bleeding denies edema  Denies breast pain or lymphadenopathy    Review of Systems:  14 point ROS of systems including Constitutional, Eyes, Respiratory, Cardiovascular, Gastroenterology, Genitourinary, Integumentary, Muscularskeletal, Psychiatric were all negative except for pertinent positives noted in my HPI.      Physical Examination:  General: The patient is a pleasant female in no acute distress.  HEENT: EOMI, PERRL. Sclerae are anicteric. Oral mucosa is pink and moist with no lesions or thrush.   Lymph: Neck is supple with no lymphadenopathy in the cervical or supraclavicular areas.   Heart: Regular rate and rhythm.   Lungs: Clear to auscultation bilaterally.   GI: Bowel sounds present, soft, nontender with no palpable hepatosplenomegaly or masses.   Extremities: No lower extremity edema noted bilaterally.   Skin: No rashes, petechiae, or bruising noted on exposed skin.  Bilateral breast-no lymphadenopathy noted    Laboratory Data:  CBC and CMP      Assessment and Plan:    Left Breast cancer  Follows with Dr. Tariq  Currently getting neoadjuvant treatment with Taxol and dose dense Adriamycin and Cytoxan  -Patient finished 12 weeks of Taxol  06/23/2025-started dose dense Adriamycin Cytoxan.  potential side  effects reviewed patient agrees to continue with treatment  Scheduled for bilateral breast MRI after Adriamycin and Cytoxan and follow-up appointment with Dr. Tariq and Dr. Shaikh     Mild leukocytosis  Patient afebrile asymptomatic  Denies fever chills sweats or any signs symptoms infection   Check temperature frequently in the event of fever chills sweats or any signs symptoms of infection please call our clinic    Please call clinic with any changes in health condition or questions    ARASH Diaz [facility] [place]     Chart documentation with Dragon Voice recognition Software. Although reviewed after completion, some words and grammatical errors may remain.

## 2025-07-21 NOTE — LETTER
7/21/2025      Florinda Keller  [address] [address]      Dear Colleague,    Thank you for referring your patient, Florinda Keller, to the [facility]. Please see a copy of my visit note below.    Oncology/Hematology Visit Note  Jul 21, 2025    Reason for Visit: follow up of left breast cancer  Patient follows with Dr. Marciano oliveira IIIA, cT3-N1-MX, grade 2 invasive lobular carcinoma of the left upper outer breast, ER positive, DE positive, HER2 negative, MammaPrint high risk, Ki-67 = 17%   Cerianna PET -no distant metastatic disease  Given the size of the tumor patient is currently on neoadjuvant treatment  Completed 12 weeks of Taxol   06/23/2025-started dose dense Adriamycin Cytoxan followed by Neulasta    Interval History:  Overall patient reports she has been tolerating chemotherapy well.  Denies fever chills sweats cough shortness of breath chest pain nausea vomiting diarrhea abdominal pain or bleeding denies edema  Denies breast pain or lymphadenopathy    Review of Systems:  14 point ROS of systems including Constitutional, Eyes, Respiratory, Cardiovascular, Gastroenterology, Genitourinary, Integumentary, Muscularskeletal, Psychiatric were all negative except for pertinent positives noted in my HPI.      Physical Examination:  General: The patient is a pleasant female in no acute distress.  HEENT: EOMI, PERRL. Sclerae are anicteric. Oral mucosa is pink and moist with no lesions or thrush.   Lymph: Neck is supple with no lymphadenopathy in the cervical or supraclavicular areas.   Heart: Regular rate and rhythm.   Lungs: Clear to auscultation bilaterally.   GI: Bowel sounds present, soft, nontender with no palpable hepatosplenomegaly or masses.   Extremities: No lower extremity edema noted bilaterally.   Skin: No rashes, petechiae, or bruising noted on exposed skin.  Bilateral breast-no lymphadenopathy noted    Laboratory Data:  CBC and CMP      Assessment and  Plan:    Left Breast cancer  Follows with Dr. Tariq  Currently getting neoadjuvant treatment with Taxol and dose dense Adriamycin and Cytoxan  -Patient finished 12 weeks of Taxol  06/23/2025-started dose dense Adriamycin Cytoxan.  potential side effects reviewed patient agrees to continue with treatment  Scheduled for bilateral breast MRI after Adriamycin and Cytoxan and follow-up appointment with Dr. Tariq and Dr. Shaikh     Mild leukocytosis  Patient afebrile asymptomatic  Denies fever chills sweats or any signs symptoms infection   Check temperature frequently in the event of fever chills sweats or any signs symptoms of infection please call our clinic    Please call clinic with any changes in health condition or questions    ARASH Diaz CNP  [facility]     Chart documentation with Dragon Voice recognition Software. Although reviewed after completion, some words and grammatical errors may remain.        Again, thank you for allowing me to participate in the care of your patient.        Sincerely,        ARASH Diaz CNP    Electronically signed

## 2025-07-23 ENCOUNTER — THERAPY VISIT (OUTPATIENT)
Dept: PHYSICAL THERAPY | Facility: CLINIC | Age: 45
End: 2025-07-23
Payer: COMMERCIAL

## 2025-07-23 DIAGNOSIS — M25.512 LEFT SHOULDER PAIN, UNSPECIFIED CHRONICITY: Primary | ICD-10-CM

## 2025-07-23 PROCEDURE — 97140 MANUAL THERAPY 1/> REGIONS: CPT | Mod: GP

## 2025-07-23 PROCEDURE — 97110 THERAPEUTIC EXERCISES: CPT | Mod: GP

## 2025-08-03 ENCOUNTER — INFUSION THERAPY VISIT (OUTPATIENT)
Dept: INFUSION THERAPY | Facility: CLINIC | Age: 45
End: 2025-08-03
Attending: NURSE PRACTITIONER
Payer: COMMERCIAL

## 2025-08-03 DIAGNOSIS — Z17.0 MALIGNANT NEOPLASM OF OVERLAPPING SITES OF LEFT BREAST IN FEMALE, ESTROGEN RECEPTOR POSITIVE (H): Primary | ICD-10-CM

## 2025-08-03 DIAGNOSIS — C50.812 MALIGNANT NEOPLASM OF OVERLAPPING SITES OF LEFT BREAST IN FEMALE, ESTROGEN RECEPTOR POSITIVE (H): Primary | ICD-10-CM

## 2025-08-03 LAB
ALBUMIN SERPL BCG-MCNC: 4 G/DL (ref 3.5–5.2)
ALP SERPL-CCNC: 90 U/L (ref 40–150)
ALT SERPL W P-5'-P-CCNC: 19 U/L (ref 0–50)
ANION GAP SERPL CALCULATED.3IONS-SCNC: 11 MMOL/L (ref 7–15)
AST SERPL W P-5'-P-CCNC: 19 U/L (ref 0–45)
BASOPHILS # BLD MANUAL: 0.1 10E3/UL (ref 0–0.2)
BASOPHILS NFR BLD MANUAL: 1 %
BILIRUB SERPL-MCNC: 0.2 MG/DL
BUN SERPL-MCNC: 7.7 MG/DL (ref 6–20)
CALCIUM SERPL-MCNC: 9.2 MG/DL (ref 8.8–10.4)
CANCER AG15-3 SERPL-ACNC: 87 U/ML
CHLORIDE SERPL-SCNC: 105 MMOL/L (ref 98–107)
CREAT SERPL-MCNC: 0.62 MG/DL (ref 0.51–0.95)
EGFRCR SERPLBLD CKD-EPI 2021: >90 ML/MIN/1.73M2
ELLIPTOCYTES BLD QL SMEAR: SLIGHT
EOSINOPHIL # BLD MANUAL: 0 10E3/UL (ref 0–0.7)
EOSINOPHIL NFR BLD MANUAL: 0 %
ERYTHROCYTE [DISTWIDTH] IN BLOOD BY AUTOMATED COUNT: 15.2 % (ref 10–15)
GLUCOSE SERPL-MCNC: 96 MG/DL (ref 70–99)
HCO3 SERPL-SCNC: 24 MMOL/L (ref 22–29)
HCT VFR BLD AUTO: 32.6 % (ref 35–47)
HGB BLD-MCNC: 11.3 G/DL (ref 11.7–15.7)
LYMPHOCYTES # BLD MANUAL: 1.3 10E3/UL (ref 0.8–5.3)
LYMPHOCYTES NFR BLD MANUAL: 14 %
MCH RBC QN AUTO: 32.1 PG (ref 26.5–33)
MCHC RBC AUTO-ENTMCNC: 34.7 G/DL (ref 31.5–36.5)
MCV RBC AUTO: 93 FL (ref 78–100)
METAMYELOCYTES # BLD MANUAL: 0.2 10E3/UL
METAMYELOCYTES NFR BLD MANUAL: 2 %
MONOCYTES # BLD MANUAL: 0.2 10E3/UL (ref 0–1.3)
MONOCYTES NFR BLD MANUAL: 2 %
MYELOCYTES # BLD MANUAL: 0.2 10E3/UL
MYELOCYTES NFR BLD MANUAL: 2 %
NEUTROPHILS # BLD MANUAL: 7.3 10E3/UL (ref 1.6–8.3)
NEUTROPHILS NFR BLD MANUAL: 79 %
PLAT MORPH BLD: ABNORMAL
PLATELET # BLD AUTO: 208 10E3/UL (ref 150–450)
POTASSIUM SERPL-SCNC: 4.4 MMOL/L (ref 3.4–5.3)
PROT SERPL-MCNC: 6.6 G/DL (ref 6.4–8.3)
RBC # BLD AUTO: 3.52 10E6/UL (ref 3.8–5.2)
RBC MORPH BLD: ABNORMAL
SODIUM SERPL-SCNC: 140 MMOL/L (ref 135–145)
VARIANT LYMPHS BLD QL SMEAR: PRESENT
WBC # BLD AUTO: 9.2 10E3/UL (ref 4–11)

## 2025-08-03 PROCEDURE — 84155 ASSAY OF PROTEIN SERUM: CPT | Performed by: INTERNAL MEDICINE

## 2025-08-03 PROCEDURE — 36415 COLL VENOUS BLD VENIPUNCTURE: CPT

## 2025-08-03 PROCEDURE — 85027 COMPLETE CBC AUTOMATED: CPT | Performed by: INTERNAL MEDICINE

## 2025-08-03 PROCEDURE — 86300 IMMUNOASSAY TUMOR CA 15-3: CPT | Performed by: INTERNAL MEDICINE

## 2025-08-03 PROCEDURE — 85007 BL SMEAR W/DIFF WBC COUNT: CPT | Performed by: INTERNAL MEDICINE

## 2025-08-04 ENCOUNTER — ONCOLOGY VISIT (OUTPATIENT)
Dept: ONCOLOGY | Facility: CLINIC | Age: 45
End: 2025-08-04
Attending: NURSE PRACTITIONER
Payer: COMMERCIAL

## 2025-08-04 ENCOUNTER — INFUSION THERAPY VISIT (OUTPATIENT)
Dept: INFUSION THERAPY | Facility: CLINIC | Age: 45
End: 2025-08-04
Attending: NURSE PRACTITIONER
Payer: COMMERCIAL

## 2025-08-04 VITALS
WEIGHT: 183 LBS | OXYGEN SATURATION: 100 % | TEMPERATURE: 98.7 F | HEIGHT: 67 IN | HEART RATE: 84 BPM | DIASTOLIC BLOOD PRESSURE: 75 MMHG | RESPIRATION RATE: 16 BRPM | SYSTOLIC BLOOD PRESSURE: 111 MMHG | BODY MASS INDEX: 28.72 KG/M2

## 2025-08-04 DIAGNOSIS — M89.8X9 BONE PAIN DUE TO G-CSF: ICD-10-CM

## 2025-08-04 DIAGNOSIS — Z17.0 MALIGNANT NEOPLASM OF OVERLAPPING SITES OF LEFT BREAST IN FEMALE, ESTROGEN RECEPTOR POSITIVE (H): Primary | ICD-10-CM

## 2025-08-04 DIAGNOSIS — C50.812 MALIGNANT NEOPLASM OF OVERLAPPING SITES OF LEFT BREAST IN FEMALE, ESTROGEN RECEPTOR POSITIVE (H): Primary | ICD-10-CM

## 2025-08-04 DIAGNOSIS — R11.0 NAUSEA: ICD-10-CM

## 2025-08-04 PROCEDURE — 250N000011 HC RX IP 250 OP 636: Performed by: INTERNAL MEDICINE

## 2025-08-04 PROCEDURE — 96411 CHEMO IV PUSH ADDL DRUG: CPT

## 2025-08-04 PROCEDURE — 96413 CHEMO IV INFUSION 1 HR: CPT

## 2025-08-04 PROCEDURE — 258N000003 HC RX IP 258 OP 636: Performed by: INTERNAL MEDICINE

## 2025-08-04 PROCEDURE — 99213 OFFICE O/P EST LOW 20 MIN: CPT | Mod: 25 | Performed by: INTERNAL MEDICINE

## 2025-08-04 PROCEDURE — 96375 TX/PRO/DX INJ NEW DRUG ADDON: CPT

## 2025-08-04 PROCEDURE — 96372 THER/PROPH/DIAG INJ SC/IM: CPT | Performed by: INTERNAL MEDICINE

## 2025-08-04 PROCEDURE — 96367 TX/PROPH/DG ADDL SEQ IV INF: CPT

## 2025-08-04 PROCEDURE — 96377 APPLICATON ON-BODY INJECTOR: CPT

## 2025-08-04 PROCEDURE — 99215 OFFICE O/P EST HI 40 MIN: CPT | Performed by: INTERNAL MEDICINE

## 2025-08-04 PROCEDURE — G2211 COMPLEX E/M VISIT ADD ON: HCPCS | Performed by: INTERNAL MEDICINE

## 2025-08-04 RX ORDER — ALBUTEROL SULFATE 0.83 MG/ML
2.5 SOLUTION RESPIRATORY (INHALATION)
Status: CANCELLED | OUTPATIENT
Start: 2025-08-04

## 2025-08-04 RX ORDER — HEPARIN SODIUM (PORCINE) LOCK FLUSH IV SOLN 100 UNIT/ML 100 UNIT/ML
5 SOLUTION INTRAVENOUS
Status: CANCELLED | OUTPATIENT
Start: 2025-08-04

## 2025-08-04 RX ORDER — HEPARIN SODIUM,PORCINE 10 UNIT/ML
5-20 VIAL (ML) INTRAVENOUS DAILY PRN
Status: CANCELLED | OUTPATIENT
Start: 2025-08-04

## 2025-08-04 RX ORDER — EPINEPHRINE 1 MG/ML
0.3 INJECTION, SOLUTION INTRAMUSCULAR; SUBCUTANEOUS EVERY 5 MIN PRN
Status: CANCELLED | OUTPATIENT
Start: 2025-08-04

## 2025-08-04 RX ORDER — HEPARIN SODIUM (PORCINE) LOCK FLUSH IV SOLN 100 UNIT/ML 100 UNIT/ML
5 SOLUTION INTRAVENOUS
Status: DISCONTINUED | OUTPATIENT
Start: 2025-08-04 | End: 2025-08-04 | Stop reason: HOSPADM

## 2025-08-04 RX ORDER — PALONOSETRON 0.05 MG/ML
0.25 INJECTION, SOLUTION INTRAVENOUS ONCE
Status: CANCELLED | OUTPATIENT
Start: 2025-08-04

## 2025-08-04 RX ORDER — MEPERIDINE HYDROCHLORIDE 25 MG/ML
25 INJECTION INTRAMUSCULAR; INTRAVENOUS; SUBCUTANEOUS
Status: CANCELLED | OUTPATIENT
Start: 2025-08-04

## 2025-08-04 RX ORDER — LORAZEPAM 2 MG/ML
0.5 INJECTION INTRAMUSCULAR EVERY 4 HOURS PRN
Status: CANCELLED | OUTPATIENT
Start: 2025-08-04

## 2025-08-04 RX ORDER — DOXORUBICIN HYDROCHLORIDE 2 MG/ML
60 INJECTION, SOLUTION INTRAVENOUS ONCE
Status: CANCELLED | OUTPATIENT
Start: 2025-08-04

## 2025-08-04 RX ORDER — PALONOSETRON 0.05 MG/ML
0.25 INJECTION, SOLUTION INTRAVENOUS ONCE
Status: COMPLETED | OUTPATIENT
Start: 2025-08-04 | End: 2025-08-04

## 2025-08-04 RX ORDER — DIPHENHYDRAMINE HYDROCHLORIDE 50 MG/ML
25 INJECTION, SOLUTION INTRAMUSCULAR; INTRAVENOUS
Status: CANCELLED
Start: 2025-08-04

## 2025-08-04 RX ORDER — METHYLPREDNISOLONE SODIUM SUCCINATE 40 MG/ML
40 INJECTION INTRAMUSCULAR; INTRAVENOUS
Status: CANCELLED
Start: 2025-08-04

## 2025-08-04 RX ORDER — DIPHENHYDRAMINE HYDROCHLORIDE 50 MG/ML
50 INJECTION, SOLUTION INTRAMUSCULAR; INTRAVENOUS
Status: CANCELLED
Start: 2025-08-04

## 2025-08-04 RX ORDER — ALBUTEROL SULFATE 90 UG/1
1-2 INHALANT RESPIRATORY (INHALATION)
Status: CANCELLED
Start: 2025-08-04

## 2025-08-04 RX ORDER — DOXORUBICIN HYDROCHLORIDE 2 MG/ML
60 INJECTION, SOLUTION INTRAVENOUS ONCE
Status: COMPLETED | OUTPATIENT
Start: 2025-08-04 | End: 2025-08-04

## 2025-08-04 RX ADMIN — PALONOSETRON HYDROCHLORIDE 0.25 MG: 0.25 INJECTION INTRAVENOUS at 10:08

## 2025-08-04 RX ADMIN — FOSAPREPITANT: 150 INJECTION, POWDER, LYOPHILIZED, FOR SOLUTION INTRAVENOUS at 10:21

## 2025-08-04 RX ADMIN — PEGFILGRASTIM 6 MG: KIT SUBCUTANEOUS at 11:40

## 2025-08-04 RX ADMIN — DOXORUBICIN HYDROCHLORIDE 120 MG: 2 INJECTION, SOLUTION INTRAVENOUS at 10:46

## 2025-08-04 RX ADMIN — Medication 5 ML: at 11:42

## 2025-08-04 RX ADMIN — CYCLOPHOSPHAMIDE 1210 MG: 500 INJECTION, POWDER, FOR SOLUTION INTRAVENOUS; ORAL at 11:02

## 2025-08-04 RX ADMIN — SODIUM CHLORIDE 250 ML: 0.9 INJECTION, SOLUTION INTRAVENOUS at 10:07

## 2025-08-04 ASSESSMENT — PAIN SCALES - GENERAL: PAINLEVEL_OUTOF10: NO PAIN (0)

## 2025-08-06 DIAGNOSIS — M85.9 DISORDER OF BONE DENSITY AND STRUCTURE, UNSPECIFIED: Primary | ICD-10-CM

## 2025-08-07 ENCOUNTER — HOSPITAL ENCOUNTER (OUTPATIENT)
Dept: BONE DENSITY | Facility: CLINIC | Age: 45
End: 2025-08-07
Attending: INTERNAL MEDICINE
Payer: COMMERCIAL

## 2025-08-07 DIAGNOSIS — M85.9 DISORDER OF BONE DENSITY AND STRUCTURE, UNSPECIFIED: ICD-10-CM

## 2025-08-07 PROCEDURE — 77080 DXA BONE DENSITY AXIAL: CPT

## 2025-08-08 ENCOUNTER — ANCILLARY PROCEDURE (OUTPATIENT)
Dept: MRI IMAGING | Facility: CLINIC | Age: 45
End: 2025-08-08
Attending: INTERNAL MEDICINE
Payer: COMMERCIAL

## 2025-08-08 DIAGNOSIS — Z17.0 MALIGNANT NEOPLASM OF OVERLAPPING SITES OF LEFT BREAST IN FEMALE, ESTROGEN RECEPTOR POSITIVE (H): ICD-10-CM

## 2025-08-08 DIAGNOSIS — C50.812 MALIGNANT NEOPLASM OF OVERLAPPING SITES OF LEFT BREAST IN FEMALE, ESTROGEN RECEPTOR POSITIVE (H): ICD-10-CM

## 2025-08-08 PROCEDURE — 77049 MRI BREAST C-+ W/CAD BI: CPT

## 2025-08-08 PROCEDURE — 255N000002 HC RX 255 OP 636: Performed by: INTERNAL MEDICINE

## 2025-08-08 PROCEDURE — A9585 GADOBUTROL INJECTION: HCPCS | Performed by: INTERNAL MEDICINE

## 2025-08-08 RX ORDER — GADOBUTROL 604.72 MG/ML
8.5 INJECTION INTRAVENOUS ONCE
Status: COMPLETED | OUTPATIENT
Start: 2025-08-08 | End: 2025-08-08

## 2025-08-08 RX ADMIN — GADOBUTROL 8.5 ML: 604.72 INJECTION INTRAVENOUS at 07:26

## 2025-08-11 DIAGNOSIS — Z82.49 FAMILY HISTORY OF PREMATURE CORONARY ARTERY DISEASE: Primary | ICD-10-CM

## 2025-08-12 ENCOUNTER — PATIENT OUTREACH (OUTPATIENT)
Dept: CARE COORDINATION | Facility: CLINIC | Age: 45
End: 2025-08-12
Payer: COMMERCIAL

## 2025-08-12 ENCOUNTER — MYC MEDICAL ADVICE (OUTPATIENT)
Dept: MAMMOGRAPHY | Facility: CLINIC | Age: 45
End: 2025-08-12
Payer: COMMERCIAL

## 2025-08-13 DIAGNOSIS — Z17.0 MALIGNANT NEOPLASM OF OVERLAPPING SITES OF LEFT BREAST IN FEMALE, ESTROGEN RECEPTOR POSITIVE (H): Primary | ICD-10-CM

## 2025-08-13 DIAGNOSIS — C50.812 MALIGNANT NEOPLASM OF OVERLAPPING SITES OF LEFT BREAST IN FEMALE, ESTROGEN RECEPTOR POSITIVE (H): Primary | ICD-10-CM

## 2025-08-16 ENCOUNTER — HEALTH MAINTENANCE LETTER (OUTPATIENT)
Age: 45
End: 2025-08-16

## 2025-08-21 ENCOUNTER — TRANSFERRED RECORDS (OUTPATIENT)
Dept: HEALTH INFORMATION MANAGEMENT | Facility: CLINIC | Age: 45
End: 2025-08-21
Payer: COMMERCIAL

## 2025-08-25 ENCOUNTER — THERAPY VISIT (OUTPATIENT)
Dept: PHYSICAL THERAPY | Facility: CLINIC | Age: 45
End: 2025-08-25
Payer: COMMERCIAL

## 2025-08-25 DIAGNOSIS — M25.512 LEFT SHOULDER PAIN, UNSPECIFIED CHRONICITY: Primary | ICD-10-CM

## 2025-08-25 PROCEDURE — 97140 MANUAL THERAPY 1/> REGIONS: CPT | Mod: GP

## 2025-08-26 DIAGNOSIS — Z00.6 CLINICAL TRIAL PARTICIPANT: Primary | ICD-10-CM

## 2025-09-04 ENCOUNTER — ONCOLOGY VISIT (OUTPATIENT)
Dept: ONCOLOGY | Facility: CLINIC | Age: 45
End: 2025-09-04
Attending: STUDENT IN AN ORGANIZED HEALTH CARE EDUCATION/TRAINING PROGRAM
Payer: COMMERCIAL

## 2025-09-04 VITALS
BODY MASS INDEX: 29.18 KG/M2 | HEART RATE: 78 BPM | SYSTOLIC BLOOD PRESSURE: 109 MMHG | RESPIRATION RATE: 16 BRPM | WEIGHT: 186.3 LBS | TEMPERATURE: 98.2 F | DIASTOLIC BLOOD PRESSURE: 70 MMHG | OXYGEN SATURATION: 99 %

## 2025-09-04 DIAGNOSIS — T45.1X5A CHEMOTHERAPY-INDUCED NEUROPATHY: ICD-10-CM

## 2025-09-04 DIAGNOSIS — M79.602 PAIN OF LEFT UPPER EXTREMITY: ICD-10-CM

## 2025-09-04 DIAGNOSIS — C50.812 MALIGNANT NEOPLASM OF OVERLAPPING SITES OF LEFT BREAST IN FEMALE, ESTROGEN RECEPTOR POSITIVE (H): Primary | ICD-10-CM

## 2025-09-04 DIAGNOSIS — Z17.0 MALIGNANT NEOPLASM OF OVERLAPPING SITES OF LEFT BREAST IN FEMALE, ESTROGEN RECEPTOR POSITIVE (H): Primary | ICD-10-CM

## 2025-09-04 DIAGNOSIS — G62.0 CHEMOTHERAPY-INDUCED NEUROPATHY: ICD-10-CM

## 2025-09-04 PROCEDURE — 99214 OFFICE O/P EST MOD 30 MIN: CPT | Performed by: STUDENT IN AN ORGANIZED HEALTH CARE EDUCATION/TRAINING PROGRAM

## 2025-09-04 RX ORDER — LEVOTHYROXINE SODIUM 75 UG/1
75 TABLET ORAL
COMMUNITY
Start: 2025-07-21

## 2025-09-04 ASSESSMENT — PAIN SCALES - GENERAL: PAINLEVEL_OUTOF10: NO PAIN (0)

## (undated) DEVICE — BAG DECANTER STERILE WHITE DYNJDEC09

## (undated) DEVICE — SOL NACL 0.9% INJ 250ML BAG 2B1322Q

## (undated) DEVICE — SUCTION MANIFOLD NEPTUNE 2 SYS 4 PORT 0702-020-000

## (undated) DEVICE — SU PROLENE 2-0 SHDA 48" 8533H

## (undated) DEVICE — PREP CHLORAPREP 26ML TINTED HI-LITE ORANGE 930815

## (undated) DEVICE — SU VICRYL 3-0 SH 27" J316H

## (undated) DEVICE — SU MONOCRYL 4-0 PS-2 18" UND Y496G

## (undated) DEVICE — COVER ULTRASOUND PROBE FLEXI-FEEL 6X48" LF 25-FF648

## (undated) DEVICE — LINEN TOWEL PACK X5 5464

## (undated) DEVICE — SU DERMABOND ADVANCED .7ML DNX12

## (undated) DEVICE — SOL NACL 0.9% IRRIG 1000ML BOTTLE 2F7124

## (undated) DEVICE — DRAPE COVER C-ARM SEAMLESS SNAP-KAP 03-KP26 LATEX FREE

## (undated) DEVICE — SOL WATER IRRIG 1000ML BOTTLE 2F7114

## (undated) DEVICE — DRAPE LAP TRANSVERSE 29421

## (undated) DEVICE — GLOVE BIOGEL PI MICRO SZ 7.5 48575

## (undated) DEVICE — SU VICRYL 2-0 SH 27" J317H

## (undated) DEVICE — GLOVE BIOGEL PI MICRO INDICATOR UNDERGLOVE SZ 7.5 48975

## (undated) DEVICE — ESU PENCIL W/SMOKE EVAC NEPTUNE STRYKER 0703-046-000

## (undated) DEVICE — PACK MINOR SBA15MIFSE

## (undated) RX ORDER — PROPOFOL 10 MG/ML
INJECTION, EMULSION INTRAVENOUS
Status: DISPENSED
Start: 2025-03-28

## (undated) RX ORDER — FENTANYL CITRATE 50 UG/ML
INJECTION, SOLUTION INTRAMUSCULAR; INTRAVENOUS
Status: DISPENSED
Start: 2025-03-28

## (undated) RX ORDER — ONDANSETRON 2 MG/ML
INJECTION INTRAMUSCULAR; INTRAVENOUS
Status: DISPENSED
Start: 2025-03-28

## (undated) RX ORDER — APREPITANT 40 MG/1
CAPSULE ORAL
Status: DISPENSED
Start: 2025-03-28

## (undated) RX ORDER — DEXAMETHASONE SODIUM PHOSPHATE 4 MG/ML
INJECTION, SOLUTION INTRA-ARTICULAR; INTRALESIONAL; INTRAMUSCULAR; INTRAVENOUS; SOFT TISSUE
Status: DISPENSED
Start: 2025-03-28

## (undated) RX ORDER — CEFAZOLIN SODIUM/WATER 2 G/20 ML
SYRINGE (ML) INTRAVENOUS
Status: DISPENSED
Start: 2025-03-28